# Patient Record
Sex: FEMALE | Race: BLACK OR AFRICAN AMERICAN | Employment: OTHER | URBAN - METROPOLITAN AREA
[De-identification: names, ages, dates, MRNs, and addresses within clinical notes are randomized per-mention and may not be internally consistent; named-entity substitution may affect disease eponyms.]

---

## 2017-03-31 ENCOUNTER — OFFICE VISIT (OUTPATIENT)
Dept: NEUROLOGY | Age: 72
End: 2017-03-31

## 2017-03-31 VITALS
DIASTOLIC BLOOD PRESSURE: 80 MMHG | SYSTOLIC BLOOD PRESSURE: 160 MMHG | RESPIRATION RATE: 18 BRPM | WEIGHT: 100.4 LBS | TEMPERATURE: 98.6 F | HEIGHT: 61 IN | OXYGEN SATURATION: 97 % | BODY MASS INDEX: 18.96 KG/M2 | HEART RATE: 75 BPM

## 2017-03-31 DIAGNOSIS — G62.9 POLYNEUROPATHY: Primary | ICD-10-CM

## 2017-03-31 DIAGNOSIS — R20.2 PARESTHESIA: ICD-10-CM

## 2017-03-31 DIAGNOSIS — R26.9 GAIT DISORDER: ICD-10-CM

## 2017-03-31 DIAGNOSIS — M62.838 MUSCLE SPASM: ICD-10-CM

## 2017-03-31 RX ORDER — DICLOFENAC SODIUM 75 MG/1
TABLET, DELAYED RELEASE ORAL 2 TIMES DAILY
COMMUNITY

## 2017-03-31 RX ORDER — BACLOFEN 10 MG/1
TABLET ORAL
Refills: 3 | COMMUNITY
Start: 2017-02-03 | End: 2021-01-13

## 2017-03-31 RX ORDER — GABAPENTIN 300 MG/1
CAPSULE ORAL
Refills: 2 | COMMUNITY
Start: 2017-02-03 | End: 2018-02-01 | Stop reason: SDUPTHER

## 2017-03-31 RX ORDER — OXYCODONE AND ACETAMINOPHEN 7.5; 325 MG/1; MG/1
1 TABLET ORAL
Qty: 600 TAB | Refills: 0 | Status: SHIPPED | OUTPATIENT
Start: 2017-03-31 | End: 2017-07-31 | Stop reason: SDUPTHER

## 2017-03-31 RX ORDER — FLUTICASONE PROPIONATE 220 UG/1
AEROSOL, METERED RESPIRATORY (INHALATION)
Refills: 2 | COMMUNITY
Start: 2017-02-20

## 2017-03-31 RX ORDER — PREDNISONE 5 MG/1
TABLET ORAL
Refills: 0 | COMMUNITY
Start: 2017-02-03 | End: 2017-04-02 | Stop reason: SDUPTHER

## 2017-03-31 NOTE — MR AVS SNAPSHOT
Visit Information Date & Time Provider Department Dept. Phone Encounter #  
 3/31/2017  1:40 PM [facility] [phone] [id number] Follow-up Instructions Return in about 4 weeks (around 4/28/2017). Upcoming Health Maintenance Date Due Hepatitis C Screening 1945 DTaP/Tdap/Td series (1 - Tdap) 12/3/1967 BREAST CANCER SCRN MAMMOGRAM 12/3/1996 FOBT Q 1 YEAR AGE 50-75 12/3/1996 ZOSTER VACCINE AGE 60> 12/3/2006 GLAUCOMA SCREENING Q2Y 12/3/2011 OSTEOPOROSIS SCREENING (DEXA) 12/3/2011 Pneumococcal 65+ Low/Medium Risk (1 of 2 - PCV13) 12/3/2011 MEDICARE YEARLY EXAM 12/3/2011 INFLUENZA AGE 9 TO ADULT 8/1/2016 Allergies as of 3/31/2017  Review Complete On: 3/31/2017 By: Suraj Flynn MD  
  
 Severity Noted Reaction Type Reactions Penicillins High 08/15/2014   Systemic Angioedema Ampicillin Medium 08/15/2014   Systemic Itching Tramadol  10/21/2015    Itching Current Immunizations  Reviewed on 8/16/2014 No immunizations on file. Not reviewed this visit You Were Diagnosed With   
  
 Codes Comments Polyneuropathy    -  Primary ICD-10-CM: G62.9 ICD-9-CM: 356.9 Muscle spasm     ICD-10-CM: M16.325 ICD-9-CM: 728.85 Paresthesia     ICD-10-CM: R20.2 ICD-9-CM: 782.0 Gait disorder     ICD-10-CM: R26.9 ICD-9-CM: 164. 2 Vitals BP Pulse Temp Resp Height(growth percentile) Weight(growth percentile) 160/80 (BP 1 Location: Left arm, BP Patient Position: Sitting) 75 98.6 °F (37 °C) (Oral) 18 5' 1\" (1.549 m) 100 lb 6.4 oz (45.5 kg) SpO2 BMI OB Status Smoking Status 97% 18.97 kg/m2 Postmenopausal Current Every Day Smoker BMI and BSA Data Body Mass Index Body Surface Area  
 18.97 kg/m 2 1.4 m 2 Preferred Pharmacy Pharmacy Name Phone [id number] [place], [place] - [address] [facility] Rosa M Rae [phone] Your Updated Medication List  
  
   
This list is accurate as of: 3/31/17  2:20 PM.  Always use your most recent med list.  
  
  
  
  
 aspirin, buffered 81 mg Tab Take  by mouth. baclofen 10 mg tablet Commonly known as:  LIORESAL  
TAKE 1 TABLET BY MOUTH AT BEDTIME  
  
 diclofenac EC 75 mg EC tablet Commonly known as:  VOLTAREN Take  by mouth two (2) times a day. FLOVENT  mcg/actuation inhaler Generic drug:  fluticasone INHALE 2 PUFFS BY MOUTH TWICE A DAY  
  
 gabapentin 300 mg capsule Commonly known as:  NEURONTIN  
TAKE ONE CAPSULE BY MOUTH AT BEDTIME  
  
 oxyCODONE-acetaminophen 7.5-325 mg per tablet Commonly known as:  PERCOCET Take 1 Tab by mouth every four (4) hours as needed for Pain. Max Daily Amount: 6 Tabs. PLAVIX 75 mg Tab Generic drug:  clopidogrel Take 75 mg by mouth daily. predniSONE 5 mg tablet Commonly known as:  DELTASONE  
TAKE 1 TABLET BY MOUTH TWICE A DAY PROzac 10 mg capsule Generic drug:  FLUoxetine Take 10 mg by mouth daily. Indications: DEPRESSION ASSOCIATED WITH MANIC DEPRESSIVE DISORDER  
  
 SEROQUEL PO Take  by mouth nightly. traMADol 50 mg tablet Commonly known as:  ULTRAM  
Take 1 Tab by mouth every six (6) hours as needed for Pain. Max Daily Amount: 200 mg. Prescriptions Printed Refills  
 oxyCODONE-acetaminophen (PERCOCET) 7.5-325 mg per tablet 0 Sig: Take 1 Tab by mouth every four (4) hours as needed for Pain. Max Daily Amount: 6 Tabs. Class: Print Route: Oral  
  
Follow-up Instructions Return in about 4 weeks (around 4/28/2017). Introducing [facility] & HEALTH SERVICES! Gema Lazo introduces [employer] patient portal. Now you can access parts of your medical record, email your doctor's office, and request medication refills online. 1. In your internet browser, go to [email or web address] 2. Click on the First Time User? Click Here link in the Sign In box. You will see the New Member Sign Up page. 3. Enter your [employer] Access Code exactly as it appears below. You will not need to use this code after youve completed the sign-up process. If you do not sign up before the expiration date, you must request a new code. · [employer] Access Code: [email or web address] Expires: 6/29/2017  1:18 PM 
 
4. Enter the last four digits of your Social Security Number (xxxx) and Date of Birth (mm/dd/yyyy) as indicated and click Submit. You will be taken to the next sign-up page. 5. Create a [employer] ID. This will be your [employer] login ID and cannot be changed, so think of one that is secure and easy to remember. 6. Create a [employer] password. You can change your password at any time. 7. Enter your Password Reset Question and Answer. This can be used at a later time if you forget your password. 8. Enter your e-mail address. You will receive e-mail notification when new information is available in [address] 9. Click Sign Up. You can now view and download portions of your medical record. 10. Click the Download Summary menu link to download a portable copy of your medical information. If you have questions, please visit the Frequently Asked Questions section of the [employer] website. Remember, [employer] is NOT to be used for urgent needs. For medical emergencies, dial 911. Now available from your iPhone and Android! Please provide this summary of care documentation to your next provider. Your primary care clinician is listed as Fabrice Keating If you have any questions after today's visit, please call [phone].

## 2017-03-31 NOTE — PROGRESS NOTES
Neurology Progress Note    NAME:  Cindy Becerra   :   1945   MRN:   [id number]     Date/Time:  3/31/2017  Subjective:      Cindy Becerra is a 80 y.o. female here today for follow up. Says her joints hurt, experiences numbness and tingling sensation. Had a fall, no LOC. Review of Systems:  Per HPI - Otherwise 12 point ROS was negative     []Unable to obtain  ROS due to  []mental status change  []sedated   []intubated    Medications reviewed:  Current Outpatient Prescriptions   Medication Sig Dispense Refill    baclofen (LIORESAL) 10 mg tablet TAKE 1 TABLET BY MOUTH AT BEDTIME  3    FLOVENT  mcg/actuation inhaler INHALE 2 PUFFS BY MOUTH TWICE A DAY  2    gabapentin (NEURONTIN) 300 mg capsule TAKE ONE CAPSULE BY MOUTH AT BEDTIME  2    predniSONE (DELTASONE) 5 mg tablet TAKE 1 TABLET BY MOUTH TWICE A DAY  0    diclofenac EC (VOLTAREN) 75 mg EC tablet Take  by mouth two (2) times a day.  oxyCODONE-acetaminophen (PERCOCET) 7.5-325 mg per tablet Take 1 Tab by mouth every four (4) hours as needed for Pain. Max Daily Amount: 6 Tabs. 600 Tab 0    traMADol (ULTRAM) 50 mg tablet Take 1 Tab by mouth every six (6) hours as needed for Pain. Max Daily Amount: 200 mg. 15 Tab 0    QUETIAPINE FUMARATE (SEROQUEL PO) Take  by mouth nightly.  FLUoxetine (PROZAC) 10 mg capsule Take 10 mg by mouth daily. Indications: DEPRESSION ASSOCIATED WITH MANIC DEPRESSIVE DISORDER      Aspirin, Buffered 81 mg tab Take  by mouth.  clopidogrel (PLAVIX) 75 mg tablet Take 75 mg by mouth daily. Objective:   Vitals:  Vitals:    17 1323   BP: 160/80   Pulse: 75   Resp: 18   Temp: 98.6 °F (37 °C)   TempSrc: Oral   SpO2: 97%   Weight: 100 lb 6.4 oz (45.5 kg)   Height: 5' 1\" (1.549 m)   PainSc:   8   PainLoc: Back       PHYSICAL EXAM:  General:    Alert, cooperative, no distress, appears stated age. Head:   Normocephalic, without obvious abnormality, atraumatic. Eyes:   Conjunctivae/corneas clear. PERRLA  Nose:  Nares normal. No drainage or sinus tenderness. Throat:    Lips, mucosa, and tongue normal.  No Thrush  Neck:  Supple, symmetrical,  no adenopathy, thyroid: non tender    no carotid bruit and no JVD. Back:    Symmetric,  No CVA tenderness. Lungs:   Clear to auscultation bilaterally. No Wheezing or Rhonchi. No rales. Chest wall:  No tenderness or deformity. No Accessory muscle use. Heart:   Regular rate and rhythm,  no murmur, rub or gallop. Abdomen:   Soft, non-tender. Not distended. Bowel sounds normal. No masses  Extremities: Extremities normal, atraumatic, No cyanosis. No edema. No clubbing  Skin:     Texture, turgor normal. No rashes or lesions. Not Jaundiced  Lymph nodes: Cervical, supraclavicular normal.  Psych:  Good insight. Not depressed. Not anxious or agitated. NEUROLOGICAL EXAM:  Appearance: The patient is well developed, well nourished, provides a coherent history and is in moderate distress. Mental Status: Oriented to time, place and person. Mood and affect appropriate. Cranial Nerves:   Intact visual fields. Fundi are benign. MARIA DOLORES, EOM's full, no nystagmus, no ptosis. Facial sensation is normal. Corneal reflexes are intact. Facial movement is symmetric. Hearing is normal bilaterally. Palate is midline with normal sternocleidomastoid and trapezius muscles are normal. Tongue is midline. Motor:  5/5 strength in upper and lower proximal and distal muscles. Normal bulk and tone. No fasciculations. Reflexes:   Deep tendon reflexes 2+/4 and symmetrical.   Sensory:   Decreased sensation to touch, pinprick and vibration. Gait:  Unsteady gait. Tremor:   No tremor noted. Cerebellar:  No cerebellar signs present. Neurovascular:  Normal heart sounds and regular rhythm, peripheral pulses intact, and no carotid bruits. Lab Data Reviewed:    No visits with results within 3 Month(s) from this visit.   Latest known visit with results is:    Admission on 08/15/2014, Discharged on 08/17/2014   Component Date Value Ref Range Status    Ventricular Rate 08/16/2014 42  BPM Final    Atrial Rate 08/16/2014 42  BPM Final    P-R Interval 08/16/2014 196  ms Final    QRS Duration 08/16/2014 78  ms Final    Q-T Interval 08/16/2014 552  ms Final    QTC Calculation (Bezet) 08/16/2014 460  ms Final    Calculated P Axis 08/16/2014 74  degrees Final    Calculated R Axis 08/16/2014 61  degrees Final    Calculated T Axis 08/16/2014 69  degrees Final    Diagnosis 08/16/2014    Final                    Value:Normal sinus rhythm with 2nd degree AV block                          Nonspecific ST and T wave abnormality                          Abnormal ECG                                                    Confirmed by Mauricio Flores M.D., [facility] ([id number]) on 8/18/2014 9:38:23 AM    INR 08/15/2014 1.1  0.9 - 1.1   Final    A single therapeutic range for Vit K antagonists may not be optimal for all indications - see June, 2008 issue of Chest, American College of Chest Physicians Evidence-Based Clinical Practice Guidelines, 8th Edition.     Prothrombin time 08/15/2014 11.1  9.4 - 11.7 sec Final    aPTT 08/15/2014 25.7  22.1 - 30.0 sec Final    Comment: In addition to factor deficiency, monitoring heparin therapy, etc., evaluation of a prolonged aPTT result should include consideration of preanalytic variables such as heparin flush contamination, specimen integrity issues, etc.                           NEW REFERENCE RANGE FOR [facility] LABORATORIES    aPTT, therapeutic range 08/15/2014      58.0 - 77.0 SECS Final    Crossmatch Expiration 08/15/2014 08/18/2014   Final    ABO/Rh(D) 08/15/2014 O POSITIVE   Final    Antibody screen 08/15/2014 NEG   Final    Sodium 08/16/2014 138  136 - 145 mmol/L Final    Potassium 08/16/2014 4.8  3.5 - 5.1 mmol/L Final    Chloride 08/16/2014 105  97 - 108 mmol/L Final    CO2 08/16/2014 24  21 - 32 mmol/L Final    Anion gap 08/16/2014 9  5 - 15 mmol/L Final    Glucose 08/16/2014 124* 65 - 100 mg/dL Final    BUN 08/16/2014 15  6 - 20 MG/DL Final    Creatinine 08/16/2014 0.66  0.45 - 1.15 MG/DL Final    BUN/Creatinine ratio 08/16/2014 23* 12 - 20   Final    GFR est AA 08/16/2014 >60  >60 ml/min/1.73m2 Final    GFR est non-AA 08/16/2014 >60  >60 ml/min/1.73m2 Final    Comment: Estimated GFR is calculated using the IDMS-traceable Modification of Diet in Renal Disease (MDRD) Study equation, reported for both  Americans (GFRAA) and non- Americans (GFRNA), and normalized to 1.73m2 body surface area. The physician must decide which value applies to the patient. The MDRD study equation should only be used in individuals age 25 or older. It has not been validated for the following: pregnant women, patients with serious comorbid conditions, or on certain medications, or persons with extremes of body size, muscle mass, or nutritional status.  Calcium 08/16/2014 8.0* 8.5 - 10.1 MG/DL Final    WBC 08/16/2014 9.4  3.6 - 11.0 K/uL Final    RBC 08/16/2014 3.49* 3.80 - 5.20 M/uL Final    HGB 08/16/2014 12.0  11.5 - 16.0 g/dL Final    HCT 08/16/2014 35.7  35.0 - 47.0 % Final    MCV 08/16/2014 102.3* 80.0 - 99.0 FL Final    MCH 08/16/2014 34.4* 26.0 - 34.0 PG Final    MCHC 08/16/2014 33.6  30.0 - 36.5 g/dL Final    RDW 08/16/2014 13.7  11.5 - 14.5 % Final    PLATELET 45/86/9779 882  150 - 400 K/uL Final    NEUTROPHILS 08/16/2014 81* 32 - 75 % Final    LYMPHOCYTES 08/16/2014 10* 12 - 49 % Final    MONOCYTES 08/16/2014 9  5 - 13 % Final    EOSINOPHILS 08/16/2014 0  0 - 7 % Final    BASOPHILS 08/16/2014 0  0 - 1 % Final    ABS. NEUTROPHILS 08/16/2014 7.6  1.8 - 8.0 K/UL Final    ABS. LYMPHOCYTES 08/16/2014 0.9  0.8 - 3.5 K/UL Final    ABS. MONOCYTES 08/16/2014 0.9  0.0 - 1.0 K/UL Final    ABS. EOSINOPHILS 08/16/2014 0.0  0.0 - 0.4 K/UL Final    ABS.  BASOPHILS 08/16/2014 0.0  0.0 - 0.1 K/UL Final    Troponin-I, Qt. 08/16/2014 <0.04  <0.05 ng/mL Final    Comment: The presence of detectable troponin above the reference range indicates myocardial injury which may be due to ischemia, myocarditis, trauma, etc.                           Clinical correlation is necessary to establish the significance of this finding. Sequential testing is recommended to determine if the typical rise and fall of cTnI is demonstrated. Note:  Cardiac troponin I has a relatively long half life and may be present well after the CK MB has returned to baseline. The reference range is based on the 99th percentile of the referent population.  Magnesium 08/16/2014 1.9  1.6 - 2.4 mg/dL Final    Ventricular Rate 08/15/2014 72  BPM Final    Atrial Rate 08/15/2014 72  BPM Final    P-R Interval 08/15/2014 214  ms Final    QRS Duration 08/15/2014 78  ms Final    Q-T Interval 08/15/2014 426  ms Final    QTC Calculation (Bezet) 08/15/2014 466  ms Final    Calculated P Axis 08/15/2014 73  degrees Final    Calculated R Axis 08/15/2014 57  degrees Final    Calculated T Axis 08/15/2014 61  degrees Final    Diagnosis 08/15/2014    Final                    Value:Sinus rhythm with sinus arrhythmia with 1st degree AV block                          Left atrial abnormality                          Abnormal ECG                          No previous ECGs available                          Confirmed by Wade Frost M.D., [facility] ([id number]) on 8/19/2014 1:27:47 PM       CT Results (recent): MRI Results (recent):      IR Results (recent):      VAS/US Results (recent):  No results found for this or any previous visit.           Assesment  Patient Active Problem List   Diagnosis Code    Carotid stenosis I65.29    Heart murmur R01.1      ___________________________________________________  PLAN:    Continue current management        ___________________________________________________    Total time spent with patient:  []15   []25   []35   [] __ minutes    Care Plan discussed with:    []Patient   []Family    []Care Manager   []Consultant/Specialist :    ___________________________________________________    Attending Physician: Sissy Bryan MD

## 2017-04-02 RX ORDER — PREDNISONE 5 MG/1
TABLET ORAL
Qty: 60 TAB | Refills: 0 | Status: SHIPPED | OUTPATIENT
Start: 2017-04-02 | End: 2017-05-23 | Stop reason: SDUPTHER

## 2017-04-28 ENCOUNTER — OFFICE VISIT (OUTPATIENT)
Dept: NEUROLOGY | Age: 72
End: 2017-04-28

## 2017-04-28 VITALS
SYSTOLIC BLOOD PRESSURE: 119 MMHG | OXYGEN SATURATION: 97 % | DIASTOLIC BLOOD PRESSURE: 69 MMHG | WEIGHT: 99.6 LBS | RESPIRATION RATE: 16 BRPM | HEIGHT: 61 IN | HEART RATE: 71 BPM | BODY MASS INDEX: 18.81 KG/M2 | TEMPERATURE: 98.8 F

## 2017-04-28 DIAGNOSIS — R51.9 HEADACHE, UNSPECIFIED HEADACHE TYPE: ICD-10-CM

## 2017-04-28 DIAGNOSIS — R20.2 PARESTHESIA: ICD-10-CM

## 2017-04-28 DIAGNOSIS — G62.9 POLYNEUROPATHY: Primary | ICD-10-CM

## 2017-04-28 DIAGNOSIS — G89.29 CHRONIC BILATERAL LOW BACK PAIN WITHOUT SCIATICA: ICD-10-CM

## 2017-04-28 DIAGNOSIS — M47.812 CERVICAL SPONDYLOSIS WITHOUT MYELOPATHY: ICD-10-CM

## 2017-04-28 DIAGNOSIS — M54.50 CHRONIC BILATERAL LOW BACK PAIN WITHOUT SCIATICA: ICD-10-CM

## 2017-04-28 RX ORDER — QUETIAPINE FUMARATE 100 MG/1
100 TABLET, FILM COATED ORAL
COMMUNITY
Start: 2017-03-16

## 2017-04-28 RX ORDER — FLUOXETINE HYDROCHLORIDE 40 MG/1
40 CAPSULE ORAL DAILY
COMMUNITY
Start: 2017-03-16 | End: 2021-01-13

## 2017-04-28 NOTE — MR AVS SNAPSHOT
Visit Information Date & Time Provider Department Dept. Phone Encounter #  
 4/28/2017  2:40 PM Ang Bacon MD [facility] at [address] [place] [address] Dr [id number] Follow-up Instructions Return in about 3 months (around 7/28/2017). Upcoming Health Maintenance Date Due Hepatitis C Screening 1945 DTaP/Tdap/Td series (1 - Tdap) 12/3/1967 BREAST CANCER SCRN MAMMOGRAM 12/3/1996 FOBT Q 1 YEAR AGE 50-75 12/3/1996 ZOSTER VACCINE AGE 60> 12/3/2006 GLAUCOMA SCREENING Q2Y 12/3/2011 OSTEOPOROSIS SCREENING (DEXA) 12/3/2011 Pneumococcal 65+ Low/Medium Risk (1 of 2 - PCV13) 12/3/2011 MEDICARE YEARLY EXAM 12/3/2011 INFLUENZA AGE 9 TO ADULT 8/1/2016 Allergies as of 4/28/2017  Review Complete On: 4/28/2017 By: Mian Walden MD  
  
 Severity Noted Reaction Type Reactions Penicillins High 08/15/2014   Systemic Angioedema Ampicillin Medium 08/15/2014   Systemic Itching Tramadol  10/21/2015    Itching Current Immunizations  Reviewed on 8/16/2014 No immunizations on file. Not reviewed this visit You Were Diagnosed With   
  
 Codes Comments Polyneuropathy    -  Primary ICD-10-CM: G62.9 ICD-9-CM: 356.9 Paresthesia     ICD-10-CM: R20.2 ICD-9-CM: 382. 0 Chronic bilateral low back pain without sciatica     ICD-10-CM: M54.5, G89.29 ICD-9-CM: 724.2, 338.29 Headache, unspecified headache type     ICD-10-CM: R51 ICD-9-CM: 242. 0 Vitals BP Pulse Temp Resp Height(growth percentile) Weight(growth percentile)  
 119/69 (BP 1 Location: Left arm, BP Patient Position: Sitting) 71 98.8 °F (37.1 °C) (Oral) 16 5' 1\" (1.549 m) 99 lb 9.6 oz (45.2 kg) SpO2 BMI OB Status Smoking Status 97% 18.82 kg/m2 Postmenopausal Current Every Day Smoker BMI and BSA Data Body Mass Index Body Surface Area  
 18.82 kg/m 2 1.39 m 2 Preferred Pharmacy Pharmacy Name Phone [id number]- [place], [place] - [address] [phone] Your Updated Medication List  
  
   
This list is accurate as of: 4/28/17  3:29 PM.  Always use your most recent med list.  
  
  
  
  
 aspirin, buffered 81 mg Tab Take  by mouth. baclofen 10 mg tablet Commonly known as:  LIORESAL  
TAKE 1 TABLET BY MOUTH AT BEDTIME  
  
 diclofenac EC 75 mg EC tablet Commonly known as:  VOLTAREN Take  by mouth two (2) times a day. FLOVENT  mcg/actuation inhaler Generic drug:  fluticasone INHALE 2 PUFFS BY MOUTH TWICE A DAY FLUoxetine 40 mg capsule Commonly known as:  PROzac  
daily. gabapentin 300 mg capsule Commonly known as:  NEURONTIN  
TAKE ONE CAPSULE BY MOUTH AT BEDTIME  
  
 oxyCODONE-acetaminophen 7.5-325 mg per tablet Commonly known as:  PERCOCET Take 1 Tab by mouth every four (4) hours as needed for Pain. Max Daily Amount: 6 Tabs. PLAVIX 75 mg Tab Generic drug:  clopidogrel Take 75 mg by mouth daily. predniSONE 5 mg tablet Commonly known as:  DELTASONE  
TAKE 1 TABLET BY MOUTH TWICE A DAY QUEtiapine 100 mg tablet Commonly known as:  SEROquel  
nightly. traMADol 50 mg tablet Commonly known as:  ULTRAM  
Take 1 Tab by mouth every six (6) hours as needed for Pain. Max Daily Amount: 200 mg. Follow-up Instructions Return in about 3 months (around 7/28/2017). Introducing [facility]! Aruna Nassar introduces [employer] patient portal. Now you can access parts of your medical record, email your doctor's office, and request medication refills online. 1. In your internet browser, go to [email or web address] 2. Click on the First Time User? Click Here link in the Sign In box. You will see the New Member Sign Up page. 3. Enter your [employer] Access Code exactly as it appears below.  You will not need to use this code after youve completed the sign-up process. If you do not sign up before the expiration date, you must request a new code. · [employer] Access Code: [email or web address] Expires: 6/29/2017  1:18 PM 
 
4. Enter the last four digits of your Social Security Number (xxxx) and Date of Birth (mm/dd/yyyy) as indicated and click Submit. You will be taken to the next sign-up page. 5. Create a [employer] ID. This will be your [employer] login ID and cannot be changed, so think of one that is secure and easy to remember. 6. Create a [employer] password. You can change your password at any time. 7. Enter your Password Reset Question and Answer. This can be used at a later time if you forget your password. 8. Enter your e-mail address. You will receive e-mail notification when new information is available in [address] 9. Click Sign Up. You can now view and download portions of your medical record. 10. Click the Download Summary menu link to download a portable copy of your medical information. If you have questions, please visit the Frequently Asked Questions section of the [employer] website. Remember, [employer] is NOT to be used for urgent needs. For medical emergencies, dial 911. Now available from your iPhone and Android! Please provide this summary of care documentation to your next provider. Your primary care clinician is listed as Montserrat Calderon If you have any questions after today's visit, please call [phone].

## 2017-04-28 NOTE — PROGRESS NOTES
Chief Complaint   Patient presents with    Other     Polyneuropathy     1. Have you been to the ER, urgent care clinic since your last visit? Hospitalized since your last visit? [facility] emergency room    2. Have you seen or consulted any other health care providers outside of the [facility] since your last visit? Include any pap smears or colon screening. [facility] 4/9/17.

## 2017-04-28 NOTE — PROGRESS NOTES
Neurology Progress Note    NAME:  Jerzy Souza   :   1945   MRN:   [id number]     Date/Time:  2017  Subjective:      Jerzy Souza is a 80 y.o. female here today for follow up. Occasional pain and headache. Says she has been doing fairly well. Occasional dizziness. Denies fall,loss of consciousness. Admits numbness and tingling sensation. Patient still smokes, notes occasional SOB. Review of Systems:  Per HPI - Otherwise 12 point ROS was negative     []Unable to obtain  ROS due to  []mental status change  []sedated   []intubated    Medications reviewed:  Current Outpatient Prescriptions   Medication Sig Dispense Refill    FLUoxetine (PROZAC) 40 mg capsule daily.  QUEtiapine (SEROQUEL) 100 mg tablet nightly.  predniSONE (DELTASONE) 5 mg tablet TAKE 1 TABLET BY MOUTH TWICE A DAY 60 Tab 0    baclofen (LIORESAL) 10 mg tablet TAKE 1 TABLET BY MOUTH AT BEDTIME  3    FLOVENT  mcg/actuation inhaler INHALE 2 PUFFS BY MOUTH TWICE A DAY  2    diclofenac EC (VOLTAREN) 75 mg EC tablet Take  by mouth two (2) times a day.  oxyCODONE-acetaminophen (PERCOCET) 7.5-325 mg per tablet Take 1 Tab by mouth every four (4) hours as needed for Pain. Max Daily Amount: 6 Tabs. 600 Tab 0    traMADol (ULTRAM) 50 mg tablet Take 1 Tab by mouth every six (6) hours as needed for Pain. Max Daily Amount: 200 mg. 15 Tab 0    Aspirin, Buffered 81 mg tab Take  by mouth.  clopidogrel (PLAVIX) 75 mg tablet Take 75 mg by mouth daily.  gabapentin (NEURONTIN) 300 mg capsule TAKE ONE CAPSULE BY MOUTH AT BEDTIME  2        Objective:   Vitals:  Vitals:    17 1458   BP: 119/69   Pulse: 71   Resp: 16   Temp: 98.8 °F (37.1 °C)   TempSrc: Oral   SpO2: 97%   Weight: 99 lb 9.6 oz (45.2 kg)   Height: 5' 1\" (1.549 m)   PainSc:   0 - No pain       PHYSICAL EXAM:  General:    Alert, cooperative, no distress, appears stated age. Head:   Normocephalic, without obvious abnormality, atraumatic.   Eyes: Conjunctivae/corneas clear. PERRLA  Nose:  Nares normal. No drainage or sinus tenderness. Throat:    Lips, mucosa, and tongue normal.  No Thrush  Neck:  Supple, symmetrical,  no adenopathy, thyroid: non tender    no carotid bruit and no JVD. Back:    Symmetric,  No CVA tenderness. Lungs:   Clear to auscultation bilaterally. No Wheezing or Rhonchi. No rales. Chest wall:  No tenderness or deformity. No Accessory muscle use. Heart:   Regular rate and rhythm,  no murmur, rub or gallop. Abdomen:   Soft, non-tender. Not distended. Bowel sounds normal. No masses  Extremities: Extremities normal, atraumatic, No cyanosis. No edema. No clubbing  Skin:     Texture, turgor normal. No rashes or lesions. Not Jaundiced  Lymph nodes: Cervical, supraclavicular normal.  Psych:  Good insight. Not depressed. Not anxious or agitated. NEUROLOGICAL EXAM:  Appearance: The patient is well developed, well nourished, provides a coherent history and is in no acute distress. Mental Status: Oriented to time, place and person. Mood and affect appropriate. Cranial Nerves:   Intact visual fields. Fundi are benign. MARIA DOLORES, EOM's full, no nystagmus, no ptosis. Facial sensation is normal. Corneal reflexes are intact. Facial movement is symmetric. Hearing is normal bilaterally. Palate is midline with normal sternocleidomastoid and trapezius muscles are normal. Tongue is midline. Motor:  5/5 strength in upper and lower proximal and distal muscles. Normal bulk and tone. No fasciculations. Reflexes:   Deep tendon reflexes 2+/4 and symmetrical.   Sensory:   Decreased sensation to touch, pinprick and vibration. Gait:  Normal gait. Tremor:   No tremor noted. Cerebellar:  No cerebellar signs present. Neurovascular:  Normal heart sounds and regular rhythm, peripheral pulses intact, and no carotid bruits. Lab Data Reviewed:    No visits with results within 3 Month(s) from this visit.   Latest known visit with results is:    Admission on 08/15/2014, Discharged on 08/17/2014   Component Date Value Ref Range Status    Ventricular Rate 08/16/2014 42  BPM Final    Atrial Rate 08/16/2014 42  BPM Final    P-R Interval 08/16/2014 196  ms Final    QRS Duration 08/16/2014 78  ms Final    Q-T Interval 08/16/2014 552  ms Final    QTC Calculation (Bezet) 08/16/2014 460  ms Final    Calculated P Axis 08/16/2014 74  degrees Final    Calculated R Axis 08/16/2014 61  degrees Final    Calculated T Axis 08/16/2014 69  degrees Final    Diagnosis 08/16/2014    Final                    Value:Normal sinus rhythm with 2nd degree AV block                          Nonspecific ST and T wave abnormality                          Abnormal ECG                                                    Confirmed by Violet Huertas M.D., [employer] ([id number]) on 8/18/2014 9:38:23 AM    INR 08/15/2014 1.1  0.9 - 1.1   Final    A single therapeutic range for Vit K antagonists may not be optimal for all indications - see June, 2008 issue of Chest, American College of Chest Physicians Evidence-Based Clinical Practice Guidelines, 8th Edition.     Prothrombin time 08/15/2014 11.1  9.4 - 11.7 sec Final    aPTT 08/15/2014 25.7  22.1 - 30.0 sec Final    Comment: In addition to factor deficiency, monitoring heparin therapy, etc., evaluation of a prolonged aPTT result should include consideration of preanalytic variables such as heparin flush contamination, specimen integrity issues, etc.                           NEW REFERENCE RANGE FOR [facility]ATORIES    aPTT, therapeutic range 08/15/2014      58.0 - 77.0 SECS Final    Crossmatch Expiration 08/15/2014 08/18/2014   Final    ABO/Rh(D) 08/15/2014 O POSITIVE   Final    Antibody screen 08/15/2014 NEG   Final    Sodium 08/16/2014 138  136 - 145 mmol/L Final    Potassium 08/16/2014 4.8  3.5 - 5.1 mmol/L Final    Chloride 08/16/2014 105  97 - 108 mmol/L Final    CO2 08/16/2014 24  21 - 32 mmol/L Final    Anion gap 08/16/2014 9  5 - 15 mmol/L Final    Glucose 08/16/2014 124* 65 - 100 mg/dL Final    BUN 08/16/2014 15  6 - 20 MG/DL Final    Creatinine 08/16/2014 0.66  0.45 - 1.15 MG/DL Final    BUN/Creatinine ratio 08/16/2014 23* 12 - 20   Final    GFR est AA 08/16/2014 >60  >60 ml/min/1.73m2 Final    GFR est non-AA 08/16/2014 >60  >60 ml/min/1.73m2 Final    Comment: Estimated GFR is calculated using the IDMS-traceable Modification of Diet in Renal Disease (MDRD) Study equation, reported for both  Americans (GFRAA) and non- Americans (GFRNA), and normalized to 1.73m2 body surface area. The physician must decide which value applies to the patient. The MDRD study equation should only be used in individuals age 25 or older. It has not been validated for the following: pregnant women, patients with serious comorbid conditions, or on certain medications, or persons with extremes of body size, muscle mass, or nutritional status.  Calcium 08/16/2014 8.0* 8.5 - 10.1 MG/DL Final    WBC 08/16/2014 9.4  3.6 - 11.0 K/uL Final    RBC 08/16/2014 3.49* 3.80 - 5.20 M/uL Final    HGB 08/16/2014 12.0  11.5 - 16.0 g/dL Final    HCT 08/16/2014 35.7  35.0 - 47.0 % Final    MCV 08/16/2014 102.3* 80.0 - 99.0 FL Final    MCH 08/16/2014 34.4* 26.0 - 34.0 PG Final    MCHC 08/16/2014 33.6  30.0 - 36.5 g/dL Final    RDW 08/16/2014 13.7  11.5 - 14.5 % Final    PLATELET 41/37/9498 141  150 - 400 K/uL Final    NEUTROPHILS 08/16/2014 81* 32 - 75 % Final    LYMPHOCYTES 08/16/2014 10* 12 - 49 % Final    MONOCYTES 08/16/2014 9  5 - 13 % Final    EOSINOPHILS 08/16/2014 0  0 - 7 % Final    BASOPHILS 08/16/2014 0  0 - 1 % Final    ABS. NEUTROPHILS 08/16/2014 7.6  1.8 - 8.0 K/UL Final    ABS. LYMPHOCYTES 08/16/2014 0.9  0.8 - 3.5 K/UL Final    ABS. MONOCYTES 08/16/2014 0.9  0.0 - 1.0 K/UL Final    ABS. EOSINOPHILS 08/16/2014 0.0  0.0 - 0.4 K/UL Final    ABS.  BASOPHILS 08/16/2014 0.0  0.0 - 0.1 K/UL Final    Troponin-I, Qt. 08/16/2014 <0.04  <0.05 ng/mL Final    Comment: The presence of detectable troponin above the reference range indicates myocardial injury which may be due to ischemia, myocarditis, trauma, etc.                           Clinical correlation is necessary to establish the significance of this finding. Sequential testing is recommended to determine if the typical rise and fall of cTnI is demonstrated. Note:  Cardiac troponin I has a relatively long half life and may be present well after the CK MB has returned to baseline. The reference range is based on the 99th percentile of the referent population.  Magnesium 08/16/2014 1.9  1.6 - 2.4 mg/dL Final    Ventricular Rate 08/15/2014 72  BPM Final    Atrial Rate 08/15/2014 72  BPM Final    P-R Interval 08/15/2014 214  ms Final    QRS Duration 08/15/2014 78  ms Final    Q-T Interval 08/15/2014 426  ms Final    QTC Calculation (Bezet) 08/15/2014 466  ms Final    Calculated P Axis 08/15/2014 73  degrees Final    Calculated R Axis 08/15/2014 57  degrees Final    Calculated T Axis 08/15/2014 61  degrees Final    Diagnosis 08/15/2014    Final                    Value:Sinus rhythm with sinus arrhythmia with 1st degree AV block                          Left atrial abnormality                          Abnormal ECG                          No previous ECGs available                          Confirmed by Jose Hawkins M.D., [facility] [place] ([id number]) on 8/19/2014 1:27:47 PM       CT Results (recent):    Results from Abstract encounter on 04/19/17   CT ABD PELV W CONT    MRI Results (recent):      IR Results (recent):      VAS/US Results (recent):  No results found for this or any previous visit.           Assesment  Patient Active Problem List   Diagnosis Code    Carotid stenosis I65.29    Heart murmur R01.1      ___________________________________________________  PLAN:Continue current management      ICD-10-CM ICD-9-CM    1. Polyneuropathy G62.9 356.9    2. Paresthesia R20.2 782.0    3. Chronic bilateral low back pain without sciatica M54.5 724.2     G89.29 338.29    4.  Headache, unspecified headache type R51 784.0      Follow-up Disposition: Not on File         ___________________________________________________    Total time spent with patient:  []15   []25   []35   [] __ minutes    Care Plan discussed with:    []Patient   []Family    []Care Manager   []Consultant/Specialist :    ___________________________________________________    Attending Physician: Juan Jordan MD

## 2017-05-23 RX ORDER — PREDNISONE 5 MG/1
TABLET ORAL
Qty: 60 TAB | Refills: 0 | Status: SHIPPED | OUTPATIENT
Start: 2017-05-23 | End: 2017-07-31 | Stop reason: SDUPTHER

## 2017-07-31 ENCOUNTER — OFFICE VISIT (OUTPATIENT)
Dept: NEUROLOGY | Age: 72
End: 2017-07-31

## 2017-07-31 VITALS
RESPIRATION RATE: 16 BRPM | WEIGHT: 98.2 LBS | TEMPERATURE: 96.5 F | HEART RATE: 75 BPM | HEIGHT: 61 IN | OXYGEN SATURATION: 97 % | BODY MASS INDEX: 18.54 KG/M2 | SYSTOLIC BLOOD PRESSURE: 158 MMHG | DIASTOLIC BLOOD PRESSURE: 84 MMHG

## 2017-07-31 DIAGNOSIS — F11.90 CHRONIC, CONTINUOUS USE OF OPIOIDS: ICD-10-CM

## 2017-07-31 DIAGNOSIS — R26.9 GAIT DISORDER: ICD-10-CM

## 2017-07-31 DIAGNOSIS — M79.18 MYOFASCIAL MUSCLE PAIN: ICD-10-CM

## 2017-07-31 DIAGNOSIS — G62.9 POLYNEUROPATHY: Primary | ICD-10-CM

## 2017-07-31 DIAGNOSIS — G89.4 CHRONIC PAIN SYNDROME: ICD-10-CM

## 2017-07-31 RX ORDER — PREDNISONE 5 MG/1
5 TABLET ORAL 2 TIMES DAILY
Qty: 60 TAB | Refills: 0 | Status: SHIPPED | OUTPATIENT
Start: 2017-07-31 | End: 2018-02-01 | Stop reason: SDUPTHER

## 2017-07-31 RX ORDER — OXYCODONE AND ACETAMINOPHEN 7.5; 325 MG/1; MG/1
1 TABLET ORAL
Qty: 600 TAB | Refills: 0 | Status: ON HOLD | OUTPATIENT
Start: 2017-07-31 | End: 2017-10-20

## 2017-07-31 RX ORDER — ENOXAPARIN SODIUM 100 MG/ML
INJECTION SUBCUTANEOUS DAILY
COMMUNITY
Start: 2017-07-27 | End: 2017-10-12

## 2017-07-31 NOTE — PROGRESS NOTES
Chief Complaint   Patient presents with    Other     Polyneuropathy    Medication Refill     1. Have you been to the ER, urgent care clinic since your last visit? Hospitalized since your last visit? No    2. Have you seen or consulted any other health care providers outside of the [address] [place] [address] since your last visit? Include any pap smears or colon screening. [facility]      Patient signed control substance contract and UDS.

## 2017-07-31 NOTE — PROGRESS NOTES
Neurology Progress Note    NAME:  Daniela Ba   :   1945   MRN:   [id number]     Date/Time:  2017  Subjective:      Daniela Ba is a 80 y.o. female here today for follow up  Pain is has increased since after the foot surgery, pain is sharp in nature, and tend to radiate,activity aggravates the pain. Says she has been having Right foot pain which started after she twisted her ankle, thought it would get better but persisted,so she had to see the foot dctor. She had Right foot surgery , before the surgery apparently she had Bradycardia, so she would be seeing her PCP, for heart monitor. Admits neck pain, back pain, joint and muscle pain. Denies dysphagia, odynophagia, constipation ,diarrhea, hematuria, dysuria, hematochezia. Review of Systems:   Neurological ROS: positive for - dizziness, gait disturbance, impaired coordination/balance, numbness/tingling and weakness    Medications reviewed:  Current Outpatient Prescriptions   Medication Sig Dispense Refill    enoxaparin (LOVENOX) 40 mg/0.4 mL daily.  predniSONE (DELTASONE) 5 mg tablet Take 1 Tab by mouth two (2) times a day. 60 Tab 0    oxyCODONE-acetaminophen (PERCOCET) 7.5-325 mg per tablet Take 1 Tab by mouth every four (4) hours as needed for Pain. Max Daily Amount: 6 Tabs. 600 Tab 0    FLUoxetine (PROZAC) 40 mg capsule daily.  QUEtiapine (SEROQUEL) 100 mg tablet nightly.  baclofen (LIORESAL) 10 mg tablet TAKE 1 TABLET BY MOUTH AT BEDTIME  3    FLOVENT  mcg/actuation inhaler INHALE 2 PUFFS BY MOUTH TWICE A DAY  2    gabapentin (NEURONTIN) 300 mg capsule TAKE ONE CAPSULE BY MOUTH AT BEDTIME  2    diclofenac EC (VOLTAREN) 75 mg EC tablet Take  by mouth two (2) times a day.  Aspirin, Buffered 81 mg tab Take  by mouth.  traMADol (ULTRAM) 50 mg tablet Take 1 Tab by mouth every six (6) hours as needed for Pain.  Max Daily Amount: 200 mg. 15 Tab 0    clopidogrel (PLAVIX) 75 mg tablet Take 75 mg by mouth daily. Objective:   Vitals:  Vitals:    07/31/17 1340   BP: 158/84   Pulse: 75   Resp: 16   Temp: 96.5 °F (35.8 °C)   TempSrc: Oral   SpO2: 97%   Weight: 98 lb 3.2 oz (44.5 kg)   Height: 5' 1\" (1.549 m)   PainSc:   9   PainLoc: Foot               Lab Data Reviewed:  Lab Results   Component Value Date/Time    WBC 9.4 08/16/2014 03:25 AM    HCT 35.7 08/16/2014 03:25 AM    HGB 12.0 08/16/2014 03:25 AM    PLATELET 552 52/00/6046 03:25 AM       Lab Results   Component Value Date/Time    Sodium 138 08/16/2014 03:25 AM    Potassium 4.8 08/16/2014 03:25 AM    Chloride 105 08/16/2014 03:25 AM    CO2 24 08/16/2014 03:25 AM    Glucose 124 08/16/2014 03:25 AM    BUN 15 08/16/2014 03:25 AM    Creatinine 0.66 08/16/2014 03:25 AM    Calcium 8.0 08/16/2014 03:25 AM       No components found for: TROPQUANT    No results found for: BRENDEN      No results found for: HBA1C, HGBE8, JCL7UYRR, IIR9LIJB, DIV6AFLC     No results found for: B12LT, FOL, RBCF    No results found for: BRENDEN, ANARX, ANAIGG, XBANA    No results found for: CHOL, CHOLPOCT, CHOLX, CHLST, CHOLV, HDL, HDLPOC, LDL, LDLCPOC, LDLC, DLDLP, VLDLC, VLDL, TGLX, TRIGL, TRIGP, TGLPOCT, CHHD, CHHDX      CT Results (recent):    Results from Abstract encounter on 04/19/17   CT ABD PELV W CONT    MRI Results (recent):      IR Results (recent):      VAS/US Results (recent):  No results found for this or any previous visit. PHYSICAL EXAM:  General:    Alert, cooperative, no distress, appears stated age. Head:   Normocephalic, without obvious abnormality, atraumatic. Eyes:   Conjunctivae/corneas clear. PERRLA  Nose:  Nares normal. No drainage or sinus tenderness. Throat:    Lips, mucosa, and tongue normal.  No Thrush  Neck:  Supple, symmetrical,  no adenopathy, thyroid: non tender    no carotid bruit and no JVD. Back:    Symmetric,  No CVA tenderness. Lungs:   Clear to auscultation bilaterally. No Wheezing or Rhonchi. No rales.   Chest wall:  No tenderness or deformity. No Accessory muscle use. Heart:   Regular rate and rhythm,  no murmur, rub or gallop. Abdomen:   Soft, non-tender. Not distended. Bowel sounds normal. No masses  Extremities: Extremities normal, atraumatic, No cyanosis. No edema. No clubbing  Skin:     Texture, turgor normal. No rashes or lesions. Not Jaundiced  Lymph nodes: Cervical, supraclavicular normal.  Psych:  Good insight. Not depressed. Not anxious or agitated. NEUROLOGICAL EXAM:  Appearance: The patient is well developed, well nourished, provides a coherent history and is in no acute distress. Mental Status: Oriented to time, place and person. Mood and affect appropriate. Cranial Nerves:   Intact visual fields. Fundi are benign. MARIA DOLORES, EOM's full, no nystagmus, no ptosis. Facial sensation is normal. Corneal reflexes are intact. Facial movement is symmetric. Hearing is normal bilaterally. Palate is midline with normal sternocleidomastoid and trapezius muscles are normal. Tongue is midline. Motor:  5/5 strength in upper and lower proximal and distal muscles. Normal bulk and tone. No fasciculations. Reflexes:   Deep tendon reflexes 2+/4 and symmetrical.   Sensory:   Normal to touch, pinprick and vibration. Gait:  Unsteady gait. Tremor:   No tremor noted. Cerebellar:  No cerebellar signs present. Neurovascular:  Normal heart sounds and regular rhythm, peripheral pulses intact, and no carotid bruits. Assesment  1. Polyneuropathy (HCC)    - predniSONE (DELTASONE) 5 mg tablet; Dispense: 60 Tab; Refill: 0  - oxyCODONE-acetaminophen (PERCOCET) 7.5-325 mg per tablet; Take 1 Tab by mouth every four (4) hours as needed for Pain. Max Daily Amount: 6 Tabs. Dispense: 600 Tab; Refill: 0    2. Chronic, continuous use of opioids    - COMPLIANCE DRUG SCREEN/PRESCRIPTION MONITORING    ___________________________________________________  PLAN:Medication reviewed with patient.       ICD-10-CM ICD-9-CM    1. [facility]uropathy ([facility] G62.9 356.9 predniSONE (DELTASONE) 5 mg tablet      oxyCODONE-acetaminophen (PERCOCET) 7.5-325 mg per tablet   2. Chronic, continuous use of opioids F11.90 305.51 COMPLIANCE DRUG SCREEN/PRESCRIPTION MONITORING   3. Chronic pain syndrome G89.4 338.4    4. Gait disorder R26.9 781.2    5. Myofascial muscle pain M79.1 729.1      Follow-up Disposition:  Return in about 3 months (around 10/31/2017).            ___________________________________________________    Total time spent with patient:  []15   []25   []35   [] __ minutes    Care Plan discussed with:    [x]Patient   []Family    []Care Manager   []Consultant/Specialist :    ___________________________________________________    Attending Physician: David Parada MD

## 2017-07-31 NOTE — MR AVS SNAPSHOT
Visit Information Date & Time Provider Department Dept. Phone Encounter #  
 7/31/2017  2:00 PM Ang Hood MD [facility] at [address] [place] [address] Dr [id number] Follow-up Instructions Return in about 3 months (around 10/31/2017). Upcoming Health Maintenance Date Due Hepatitis C Screening 1945 DTaP/Tdap/Td series (1 - Tdap) 12/3/1967 BREAST CANCER SCRN MAMMOGRAM 12/3/1996 FOBT Q 1 YEAR AGE 50-75 12/3/1996 ZOSTER VACCINE AGE 60> 10/3/2006 GLAUCOMA SCREENING Q2Y 12/3/2011 OSTEOPOROSIS SCREENING (DEXA) 12/3/2011 Pneumococcal 65+ Low/Medium Risk (1 of 2 - PCV13) 12/3/2011 MEDICARE YEARLY EXAM 12/3/2011 INFLUENZA AGE 9 TO ADULT 8/1/2017 Allergies as of 7/31/2017  Review Complete On: 7/31/2017 By: Caryn Lindsay MD  
  
 Severity Noted Reaction Type Reactions Penicillins High 08/15/2014   Systemic Angioedema Ampicillin Medium 08/15/2014   Systemic Itching Tramadol  10/21/2015    Itching Current Immunizations  Reviewed on 8/16/2014 No immunizations on file. Not reviewed this visit You Were Diagnosed With   
  
 Codes Comments Polyneuropathy ([facility]    -  Primary ICD-10-CM: G62.9 ICD-9-CM: 356.9 Chronic, continuous use of opioids     ICD-10-CM: F11.90 ICD-9-CM: 305.51 Chronic pain syndrome     ICD-10-CM: G89.4 ICD-9-CM: 338.4 Gait disorder     ICD-10-CM: R26.9 ICD-9-CM: 715. 2 Myofascial muscle pain     ICD-10-CM: M79.1 ICD-9-CM: 729.1 Vitals BP Pulse Temp Resp Height(growth percentile) Weight(growth percentile) 158/84 (BP 1 Location: Right arm, BP Patient Position: Sitting) 75 96.5 °F (35.8 °C) (Oral) 16 5' 1\" (1.549 m) 98 lb 3.2 oz (44.5 kg) SpO2 BMI OB Status Smoking Status 97% 18.55 kg/m2 Postmenopausal Current Every Day Smoker BMI and BSA Data Body Mass Index Body Surface Area 18.55 kg/m 2 1.38 m 2 Preferred Pharmacy Pharmacy Name Phone [facility][id number]- [place], [place] - [address] [phone] Your Updated Medication List  
  
   
This list is accurate as of: 7/31/17  2:08 PM.  Always use your most recent med list.  
  
  
  
  
 aspirin, buffered 81 mg Tab Take  by mouth. baclofen 10 mg tablet Commonly known as:  LIORESAL  
TAKE 1 TABLET BY MOUTH AT BEDTIME  
  
 diclofenac EC 75 mg EC tablet Commonly known as:  VOLTAREN Take  by mouth two (2) times a day. enoxaparin 40 mg/0.4 mL Commonly known as:  LOVENOX  
daily. FLOVENT  mcg/actuation inhaler Generic drug:  fluticasone INHALE 2 PUFFS BY MOUTH TWICE A DAY FLUoxetine 40 mg capsule Commonly known as:  PROzac  
daily. gabapentin 300 mg capsule Commonly known as:  NEURONTIN  
TAKE ONE CAPSULE BY MOUTH AT BEDTIME  
  
 oxyCODONE-acetaminophen 7.5-325 mg per tablet Commonly known as:  PERCOCET Take 1 Tab by mouth every four (4) hours as needed for Pain. Max Daily Amount: 6 Tabs. PLAVIX 75 mg Tab Generic drug:  clopidogrel Take 75 mg by mouth daily. predniSONE 5 mg tablet Commonly known as:  Tash Gong Take 1 Tab by mouth two (2) times a day. QUEtiapine 100 mg tablet Commonly known as:  SEROquel  
nightly. traMADol 50 mg tablet Commonly known as:  ULTRAM  
Take 1 Tab by mouth every six (6) hours as needed for Pain. Max Daily Amount: 200 mg. Prescriptions Printed Refills  
 oxyCODONE-acetaminophen (PERCOCET) 7.5-325 mg per tablet 0 Sig: Take 1 Tab by mouth every four (4) hours as needed for Pain. Max Daily Amount: 6 Tabs. Class: Print Route: Oral  
  
Prescriptions Sent to Pharmacy Refills  
 predniSONE (DELTASONE) 5 mg tablet 0 Sig: Take 1 Tab by mouth two (2) times a day. Class: Normal  
 Pharmacy: [facility]/pharmacy [address] [facility] [place] AT [address] #: [phone] Route: Oral  
  
We Performed the Following [facility] MONITORING [[id number] Custom] Follow-up Instructions Return in about 3 months (around 10/31/2017). Introducing [facility]! [facility] introduces [employer] patient portal. Now you can access parts of your medical record, email your doctor's office, and request medication refills online. 1. In your internet browser, go to [email or web address] 2. Click on the First Time User? Click Here link in the Sign In box. You will see the New Member Sign Up page. 3. Enter your [employer] Access Code exactly as it appears below. You will not need to use this code after youve completed the sign-up process. If you do not sign up before the expiration date, you must request a new code. · [employer] Access Code: [email or web address] Expires: 10/29/2017  1:39 PM 
 
4. Enter the last four digits of your Social Security Number (xxxx) and Date of Birth (mm/dd/yyyy) as indicated and click Submit. You will be taken to the next sign-up page. 5. Create a [employer] ID. This will be your [employer] login ID and cannot be changed, so think of one that is secure and easy to remember. 6. Create a [employer] password. You can change your password at any time. 7. Enter your Password Reset Question and Answer. This can be used at a later time if you forget your password. 8. Enter your e-mail address. You will receive e-mail notification when new information is available in [address] 9. Click Sign Up. You can now view and download portions of your medical record. 10. Click the Download Summary menu link to download a portable copy of your medical information. If you have questions, please visit the Frequently Asked Questions section of the [employer] website. Remember, [employer] is NOT to be used for urgent needs. For medical emergencies, dial 911. Now available from your iPhone and Android! Please provide this summary of care documentation to your next provider. Your primary care clinician is listed as Harrison Oviedo If you have any questions after today's visit, please call [phone].

## 2017-08-09 LAB — DRUGS UR: NORMAL

## 2017-10-12 ENCOUNTER — HOSPITAL ENCOUNTER (OUTPATIENT)
Dept: PREADMISSION TESTING | Age: 72
Discharge: HOME | End: 2017-10-12
Attending: SURGERY
Payer: MEDICARE

## 2017-10-12 ENCOUNTER — HOSPITAL ENCOUNTER (OUTPATIENT)
Dept: GENERAL RADIOLOGY | Age: 72
Discharge: HOME | End: 2017-10-12
Attending: SURGERY
Payer: MEDICARE

## 2017-10-12 VITALS
SYSTOLIC BLOOD PRESSURE: 196 MMHG | DIASTOLIC BLOOD PRESSURE: 73 MMHG | WEIGHT: 99.21 LBS | OXYGEN SATURATION: 96 % | TEMPERATURE: 97.5 F | BODY MASS INDEX: 18.73 KG/M2 | HEART RATE: 77 BPM | RESPIRATION RATE: 20 BRPM | HEIGHT: 61 IN

## 2017-10-12 LAB
ABO + RH BLD: NORMAL
ALBUMIN SERPL-MCNC: 4 G/DL (ref 3.5–5)
ALBUMIN/GLOB SERPL: 1.1 {RATIO} (ref 1.1–2.2)
ALP SERPL-CCNC: 107 U/L (ref 45–117)
ALT SERPL-CCNC: 20 U/L (ref 12–78)
ANION GAP SERPL CALC-SCNC: 7 MMOL/L (ref 5–15)
APPEARANCE UR: CLEAR
AST SERPL-CCNC: 22 U/L (ref 15–37)
BACTERIA URNS QL MICRO: NEGATIVE /HPF
BILIRUB SERPL-MCNC: 0.5 MG/DL (ref 0.2–1)
BILIRUB UR QL: NEGATIVE
BLOOD GROUP ANTIBODIES SERPL: NORMAL
BUN SERPL-MCNC: 17 MG/DL (ref 6–20)
BUN/CREAT SERPL: 25 (ref 12–20)
CALCIUM SERPL-MCNC: 8.9 MG/DL (ref 8.5–10.1)
CHLORIDE SERPL-SCNC: 106 MMOL/L (ref 97–108)
CO2 SERPL-SCNC: 25 MMOL/L (ref 21–32)
COLOR UR: NORMAL
CREAT SERPL-MCNC: 0.67 MG/DL (ref 0.55–1.02)
EPITH CASTS URNS QL MICRO: NORMAL /LPF
ERYTHROCYTE [DISTWIDTH] IN BLOOD BY AUTOMATED COUNT: 13.6 % (ref 11.5–14.5)
GLOBULIN SER CALC-MCNC: 3.5 G/DL (ref 2–4)
GLUCOSE SERPL-MCNC: 106 MG/DL (ref 65–100)
GLUCOSE UR STRIP.AUTO-MCNC: NEGATIVE MG/DL
HCT VFR BLD AUTO: 44.3 % (ref 35–47)
HGB BLD-MCNC: 14.9 G/DL (ref 11.5–16)
HGB UR QL STRIP: NEGATIVE
HYALINE CASTS URNS QL MICRO: NORMAL /LPF (ref 0–5)
KETONES UR QL STRIP.AUTO: NEGATIVE MG/DL
LEUKOCYTE ESTERASE UR QL STRIP.AUTO: NEGATIVE
MCH RBC QN AUTO: 35.2 PG (ref 26–34)
MCHC RBC AUTO-ENTMCNC: 33.6 G/DL (ref 30–36.5)
MCV RBC AUTO: 104.7 FL (ref 80–99)
NITRITE UR QL STRIP.AUTO: NEGATIVE
PH UR STRIP: 6 [PH] (ref 5–8)
PLATELET # BLD AUTO: 232 K/UL (ref 150–400)
POTASSIUM SERPL-SCNC: 3.6 MMOL/L (ref 3.5–5.1)
PROT SERPL-MCNC: 7.5 G/DL (ref 6.4–8.2)
PROT UR STRIP-MCNC: NEGATIVE MG/DL
RBC # BLD AUTO: 4.23 M/UL (ref 3.8–5.2)
RBC #/AREA URNS HPF: NORMAL /HPF (ref 0–5)
SODIUM SERPL-SCNC: 138 MMOL/L (ref 136–145)
SP GR UR REFRACTOMETRY: 1.01 (ref 1–1.03)
SPECIMEN EXP DATE BLD: NORMAL
UA: UC IF INDICATED,UAUC: NORMAL
UROBILINOGEN UR QL STRIP.AUTO: 0.2 EU/DL (ref 0.2–1)
WBC # BLD AUTO: 11.3 K/UL (ref 3.6–11)
WBC URNS QL MICRO: NORMAL /HPF (ref 0–4)

## 2017-10-12 PROCEDURE — 71020 XR CHEST PA LAT: CPT

## 2017-10-12 PROCEDURE — 86900 BLOOD TYPING SEROLOGIC ABO: CPT | Performed by: SURGERY

## 2017-10-12 PROCEDURE — 85027 COMPLETE CBC AUTOMATED: CPT | Performed by: SURGERY

## 2017-10-12 PROCEDURE — 80053 COMPREHEN METABOLIC PANEL: CPT | Performed by: SURGERY

## 2017-10-12 PROCEDURE — 81001 URINALYSIS AUTO W/SCOPE: CPT | Performed by: SURGERY

## 2017-10-12 PROCEDURE — 93005 ELECTROCARDIOGRAM TRACING: CPT

## 2017-10-12 PROCEDURE — 36415 COLL VENOUS BLD VENIPUNCTURE: CPT | Performed by: SURGERY

## 2017-10-12 RX ORDER — ALBUTEROL SULFATE 0.83 MG/ML
1.25 SOLUTION RESPIRATORY (INHALATION) ONCE
COMMUNITY

## 2017-10-12 NOTE — PERIOP NOTES
[facility]  Preoperative Instructions        Surgery Date 10/18/17         Time of Arrival 1pm    1. On the day of your surgery, please report to the Surgical Services Registration Desk and sign in at your designated time. The Surgery Center is located to the right of the Emergency Room. 2. You must have someone with you to drive you home. You should not drive a car for 24 hours following surgery. Please make arrangements for a friend or family member to stay with you for the first 24 hours after your surgery. 3. Do not have anything to eat or drink (including water, gum, mints, coffee, juice) after midnight 10/17/17?? Abdoulaye Hopson ? This may not apply to medications prescribed by your physician. ?(Please note below the special instructions with medications to take the morning of your procedure.)    4. We recommend you do not drink any alcoholic beverages for 24 hours before and after your surgery. 5. Contact your surgeons office for instructions on the following medications: non-steroidal anti-inflammatory drugs (i.e. Advil, Aleve), vitamins, and supplements. (Some surgeons will want you to stop these medications prior to surgery and others may allow you to take them)  **If you are currently taking Plavix, Coumadin, Aspirin and/or other blood-thinning agents, contact your surgeon for instructions. ** Your surgeon will partner with the physician prescribing these medications to determine if it is safe to stop or if you need to continue taking. Please do not stop taking these medications without instructions from your surgeon    6. Wear comfortable clothes. Wear glasses instead of contacts. Do not bring any money or jewelry. Please bring picture ID, insurance card, and any prearranged co-payment or hospital payment. Do not wear make-up, particularly mascara the morning of your surgery. Do not wear nail polish, particularly if you are having foot /hand surgery.   Wear your hair loose or down, no ponytails, buns, dimas pins or clips. All body piercings must be removed. Please shower with antibacterial soap for three consecutive days before and on the morning of surgery, but do not apply any lotions, powders or deodorants after the shower on the day of surgery. Please use a fresh towels after each shower. Please sleep in clean clothes and change bed linens the night before surgery. Please do not shave for 48 hours prior to surgery. Shaving of the face is acceptable. 7. You should understand that if you do not follow these instructions your surgery may be cancelled. If your physical condition changes (I.e. fever, cold or flu) please contact your surgeon as soon as possible. 8. It is important that you be on time. If a situation occurs where you may be late, please call [phone] (OR Holding Area). 9. If you have any questions and or problems, please call [phone] (Pre-admission Testing). 10. Your surgery time may be subject to change. You will receive a phone call the evening prior if your time changes. 11.  If having outpatient surgery, you must have someone to drive you here, stay with you during the duration of your stay, and to drive you home at time of discharge. 12.   In an effort to improve the efficiency, privacy, and safety for all of our Pre-op patients visitors are not allowed in the Holding area. Once you arrive and are registered your family/visitors will be asked to remain in the waiting room. The Pre-op staff will get you from the Surgical Waiting Area and will explain to you and your family/visitors that the Pre-op phase is beginning. The staff will answer any questions and provide instructions for tracking of the patient, by use of the existing tracking number and color-coded status board in the waiting room.   At this time the staff will also ask for your designated spokesperson information in the event that the physician or staff need to provide an update or obtain any pertinent information. The designated spokesperson will be notified if the physician needs to speak to family during the pre-operative phase. If at any time your family/visitors has questions or concerns they may approach the volunteer desk in the waiting area for assistance. Special Instructions:Stop diclofenac,aspirin and plavix as instructed by . Use flovent inhaler and bring to hospital morning of surgery. Use albuterol nebulizer at home morning of surgery. MEDICATIONS TO TAKE THE MORNING OF SURGERY WITH A SIP OF WATER:prozac,percocet if needed,prednisone. I understand a pre-operative phone call will be made to verify my surgery time. In the event that I am not available, I give permission for a message to be left on my answering service and/or with another person?   {yes [phone]         ___________________      __________   _________    (Signature of Patient)             (Witness)                (Date and Time)

## 2017-10-12 NOTE — ADVANCED PRACTICE NURSE
Pt in for PAT assessment. JUSTINE 3, METS less than 4. Pt's /53 R arm. Reports MAY but states that is not new for her. Has hx of HTN (not on medication), asthma, TIA. Scheduled for surgery with Dr. Farzana Aldridge on 10/18 and has not been evaluated by PCP since last month (per pt). Pt denies CP, SOB at rest, nausea, vomiting, abdominal pain, edema of extremities. Labs, UA, EKG, CXR ordered in PAT. PC to pt's PCP to advise of pt status and to schedule appt with PCP for evaluation. Msg left on VM of provider line for staff to return call ASAP while pt is in PAT.

## 2017-10-13 LAB
ATRIAL RATE: 60 BPM
CALCULATED P AXIS, ECG09: 67 DEGREES
CALCULATED R AXIS, ECG10: 36 DEGREES
CALCULATED T AXIS, ECG11: 67 DEGREES
DIAGNOSIS, 93000: NORMAL
P-R INTERVAL, ECG05: 182 MS
Q-T INTERVAL, ECG07: 426 MS
QRS DURATION, ECG06: 74 MS
QTC CALCULATION (BEZET), ECG08: 426 MS
VENTRICULAR RATE, ECG03: 60 BPM

## 2017-10-13 NOTE — ADVANCED PRACTICE NURSE
EKG from 10/12/17 reviewed by Dr. Dex Schreiber and compared with pt's previous EKGs from 08/16/14 and 08/15/14. EKG is unchanged. Ok to proceed with surgery.

## 2017-10-13 NOTE — ADVANCED PRACTICE NURSE
PC to Lamberto Guidry, Nurse for Dr. Estrellita Borrero. Advised her of pt's elevated BPs yesterday while in PAT, report of MAY (not new per pt), and expiratory wheezing noted in PAT evaluation. Olimpia Timmons that pt is scheduled for surgery with Dr. Makayla Engel on 10/18/17 and needed evaluation for current sx as well as HTN and not on medication. Labs, EKG and CXR from 10/12/17 faxed to Lamberto Guidry at Dr. Mendy Albarran office with confirmation received. Lamberto Guidry states she will call the pt today for an appt for evaluation.

## 2017-10-16 NOTE — PERIOP NOTES
Called Dr.William Cox's office and left message on vm requesting note from office regarding patients b/p and upcoming surgery.

## 2017-10-17 RX ORDER — AMLODIPINE BESYLATE 5 MG/1
5 TABLET ORAL DAILY
COMMUNITY

## 2017-10-17 RX ORDER — AMLODIPINE BESYLATE 5 MG/1
5 TABLET ORAL DAILY
COMMUNITY
End: 2017-10-20

## 2017-10-18 ENCOUNTER — ANESTHESIA (OUTPATIENT)
Dept: SURGERY | Age: 72
DRG: 252 | End: 2017-10-18
Payer: MEDICARE

## 2017-10-18 ENCOUNTER — HOSPITAL ENCOUNTER (INPATIENT)
Age: 72
LOS: 2 days | Discharge: HOME HEALTH | DRG: 252 | End: 2017-10-20
Attending: SURGERY | Admitting: SURGERY
Payer: MEDICARE

## 2017-10-18 ENCOUNTER — ANESTHESIA EVENT (OUTPATIENT)
Dept: SURGERY | Age: 72
DRG: 252 | End: 2017-10-18
Payer: MEDICARE

## 2017-10-18 DIAGNOSIS — G62.9 POLYNEUROPATHY: ICD-10-CM

## 2017-10-18 PROBLEM — I73.9 PVD (PERIPHERAL VASCULAR DISEASE) (HCC): Status: ACTIVE | Noted: 2017-10-18

## 2017-10-18 PROCEDURE — 65660000000 HC RM CCU STEPDOWN

## 2017-10-18 PROCEDURE — 74011250636 HC RX REV CODE- 250/636: Performed by: SURGERY

## 2017-10-18 PROCEDURE — 77030002924 HC SUT GORTX WLGO -B: Performed by: SURGERY

## 2017-10-18 PROCEDURE — 76060000033 HC ANESTHESIA 1 TO 1.5 HR: Performed by: SURGERY

## 2017-10-18 PROCEDURE — 77030031139 HC SUT VCRL2 J&J -A: Performed by: SURGERY

## 2017-10-18 PROCEDURE — 77030018836 HC SOL IRR NACL ICUM -A: Performed by: SURGERY

## 2017-10-18 PROCEDURE — 77030011640 HC PAD GRND REM COVD -A: Performed by: SURGERY

## 2017-10-18 PROCEDURE — 77030002986 HC SUT PROL J&J -A: Performed by: SURGERY

## 2017-10-18 PROCEDURE — 74011258636 HC RX REV CODE- 258/636: Performed by: SURGERY

## 2017-10-18 PROCEDURE — 74011250636 HC RX REV CODE- 250/636: Performed by: ANESTHESIOLOGY

## 2017-10-18 PROCEDURE — 74011250637 HC RX REV CODE- 250/637: Performed by: SURGERY

## 2017-10-18 PROCEDURE — 77030020782 HC GWN BAIR PAWS FLX 3M -B

## 2017-10-18 PROCEDURE — 76010000161 HC OR TIME 1 TO 1.5 HR INTENSV-TIER 1: Performed by: SURGERY

## 2017-10-18 PROCEDURE — C1768 GRAFT, VASCULAR: HCPCS | Performed by: SURGERY

## 2017-10-18 PROCEDURE — 77030020256 HC SOL INJ NACL 0.9%  500ML: Performed by: SURGERY

## 2017-10-18 PROCEDURE — 74011636637 HC RX REV CODE- 636/637: Performed by: SURGERY

## 2017-10-18 PROCEDURE — 74011250636 HC RX REV CODE- 250/636

## 2017-10-18 PROCEDURE — 76210000017 HC OR PH I REC 1.5 TO 2 HR: Performed by: SURGERY

## 2017-10-18 PROCEDURE — 77030013079 HC BLNKT BAIR HGGR 3M -A: Performed by: NURSE ANESTHETIST, CERTIFIED REGISTERED

## 2017-10-18 PROCEDURE — 77030034849: Performed by: SURGERY

## 2017-10-18 PROCEDURE — 77030005520 HC CATH URETH FOL38 BARD -A: Performed by: SURGERY

## 2017-10-18 PROCEDURE — 74011000250 HC RX REV CODE- 250: Performed by: SURGERY

## 2017-10-18 PROCEDURE — 74011000250 HC RX REV CODE- 250

## 2017-10-18 PROCEDURE — 77030010516 HC APPL HEMA CLP TELE -B: Performed by: SURGERY

## 2017-10-18 DEVICE — GRAFT VASC GOR TX 6 MM ID X 50 CM STD L THN WALLED GOR TX: Type: IMPLANTABLE DEVICE | Site: LEG | Status: FUNCTIONAL

## 2017-10-18 RX ORDER — ACETAMINOPHEN 325 MG/1
325 TABLET ORAL
Status: DISCONTINUED | OUTPATIENT
Start: 2017-10-18 | End: 2017-10-20 | Stop reason: HOSPADM

## 2017-10-18 RX ORDER — ONDANSETRON 2 MG/ML
4 INJECTION INTRAMUSCULAR; INTRAVENOUS AS NEEDED
Status: DISCONTINUED | OUTPATIENT
Start: 2017-10-18 | End: 2017-10-18 | Stop reason: HOSPADM

## 2017-10-18 RX ORDER — LIDOCAINE HYDROCHLORIDE AND EPINEPHRINE 20; 5 MG/ML; UG/ML
INJECTION, SOLUTION EPIDURAL; INFILTRATION; INTRACAUDAL; PERINEURAL AS NEEDED
Status: DISCONTINUED | OUTPATIENT
Start: 2017-10-18 | End: 2017-10-18 | Stop reason: HOSPADM

## 2017-10-18 RX ORDER — MIDAZOLAM HYDROCHLORIDE 1 MG/ML
INJECTION, SOLUTION INTRAMUSCULAR; INTRAVENOUS AS NEEDED
Status: DISCONTINUED | OUTPATIENT
Start: 2017-10-18 | End: 2017-10-18 | Stop reason: HOSPADM

## 2017-10-18 RX ORDER — PROPOFOL 10 MG/ML
INJECTION, EMULSION INTRAVENOUS AS NEEDED
Status: DISCONTINUED | OUTPATIENT
Start: 2017-10-18 | End: 2017-10-18 | Stop reason: HOSPADM

## 2017-10-18 RX ORDER — FLUTICASONE PROPIONATE 220 UG/1
1 AEROSOL, METERED RESPIRATORY (INHALATION)
Status: DISCONTINUED | OUTPATIENT
Start: 2017-10-18 | End: 2017-10-18

## 2017-10-18 RX ORDER — BACLOFEN 10 MG/1
10 TABLET ORAL
Status: DISCONTINUED | OUTPATIENT
Start: 2017-10-18 | End: 2017-10-20 | Stop reason: HOSPADM

## 2017-10-18 RX ORDER — OXYCODONE AND ACETAMINOPHEN 7.5; 325 MG/1; MG/1
1 TABLET ORAL
Status: DISCONTINUED | OUTPATIENT
Start: 2017-10-18 | End: 2017-10-20 | Stop reason: HOSPADM

## 2017-10-18 RX ORDER — PREDNISONE 5 MG/1
5 TABLET ORAL 2 TIMES DAILY
Status: DISCONTINUED | OUTPATIENT
Start: 2017-10-18 | End: 2017-10-20 | Stop reason: HOSPADM

## 2017-10-18 RX ORDER — QUETIAPINE FUMARATE 100 MG/1
100 TABLET, FILM COATED ORAL
Status: DISCONTINUED | OUTPATIENT
Start: 2017-10-18 | End: 2017-10-20 | Stop reason: HOSPADM

## 2017-10-18 RX ORDER — AMLODIPINE BESYLATE 5 MG/1
5 TABLET ORAL DAILY
Status: DISCONTINUED | OUTPATIENT
Start: 2017-10-19 | End: 2017-10-19

## 2017-10-18 RX ORDER — PROPOFOL 10 MG/ML
INJECTION, EMULSION INTRAVENOUS
Status: DISCONTINUED | OUTPATIENT
Start: 2017-10-18 | End: 2017-10-18 | Stop reason: HOSPADM

## 2017-10-18 RX ORDER — CLINDAMYCIN PHOSPHATE 900 MG/50ML
900 INJECTION INTRAVENOUS ONCE
Status: COMPLETED | OUTPATIENT
Start: 2017-10-18 | End: 2017-10-18

## 2017-10-18 RX ORDER — DEXTROSE, SODIUM CHLORIDE, SODIUM LACTATE, POTASSIUM CHLORIDE, AND CALCIUM CHLORIDE 5; .6; .31; .03; .02 G/100ML; G/100ML; G/100ML; G/100ML; G/100ML
75 INJECTION, SOLUTION INTRAVENOUS CONTINUOUS
Status: DISCONTINUED | OUTPATIENT
Start: 2017-10-18 | End: 2017-10-19

## 2017-10-18 RX ORDER — MORPHINE SULFATE IN 0.9 % NACL 150MG/30ML
PATIENT CONTROLLED ANALGESIA SYRINGE INTRAVENOUS
Status: DISCONTINUED | OUTPATIENT
Start: 2017-10-18 | End: 2017-10-20

## 2017-10-18 RX ORDER — DIPHENHYDRAMINE HYDROCHLORIDE 50 MG/ML
12.5 INJECTION, SOLUTION INTRAMUSCULAR; INTRAVENOUS AS NEEDED
Status: DISCONTINUED | OUTPATIENT
Start: 2017-10-18 | End: 2017-10-18 | Stop reason: HOSPADM

## 2017-10-18 RX ORDER — SODIUM CHLORIDE 0.9 % (FLUSH) 0.9 %
5-10 SYRINGE (ML) INJECTION AS NEEDED
Status: DISCONTINUED | OUTPATIENT
Start: 2017-10-18 | End: 2017-10-18 | Stop reason: HOSPADM

## 2017-10-18 RX ORDER — GABAPENTIN 300 MG/1
300 CAPSULE ORAL 2 TIMES DAILY
Status: DISCONTINUED | OUTPATIENT
Start: 2017-10-18 | End: 2017-10-20 | Stop reason: HOSPADM

## 2017-10-18 RX ORDER — ALBUTEROL SULFATE 0.83 MG/ML
1.25 SOLUTION RESPIRATORY (INHALATION) ONCE
Status: ACTIVE | OUTPATIENT
Start: 2017-10-18 | End: 2017-10-19

## 2017-10-18 RX ORDER — PHENYLEPHRINE HCL IN 0.9% NACL 0.4MG/10ML
SYRINGE (ML) INTRAVENOUS AS NEEDED
Status: DISCONTINUED | OUTPATIENT
Start: 2017-10-18 | End: 2017-10-18 | Stop reason: HOSPADM

## 2017-10-18 RX ORDER — GUAIFENESIN 100 MG/5ML
81 LIQUID (ML) ORAL DAILY
Status: DISCONTINUED | OUTPATIENT
Start: 2017-10-19 | End: 2017-10-20 | Stop reason: HOSPADM

## 2017-10-18 RX ORDER — CLOPIDOGREL BISULFATE 75 MG/1
75 TABLET ORAL DAILY
Status: DISCONTINUED | OUTPATIENT
Start: 2017-10-19 | End: 2017-10-20 | Stop reason: HOSPADM

## 2017-10-18 RX ORDER — AMLODIPINE BESYLATE 5 MG/1
5 TABLET ORAL DAILY
Status: DISCONTINUED | OUTPATIENT
Start: 2017-10-19 | End: 2017-10-20 | Stop reason: HOSPADM

## 2017-10-18 RX ORDER — ONDANSETRON 2 MG/ML
4 INJECTION INTRAMUSCULAR; INTRAVENOUS
Status: DISCONTINUED | OUTPATIENT
Start: 2017-10-18 | End: 2017-10-20 | Stop reason: HOSPADM

## 2017-10-18 RX ORDER — DICLOFENAC SODIUM 75 MG/1
75 TABLET, DELAYED RELEASE ORAL 2 TIMES DAILY
Status: DISCONTINUED | OUTPATIENT
Start: 2017-10-18 | End: 2017-10-20 | Stop reason: HOSPADM

## 2017-10-18 RX ORDER — FENTANYL CITRATE 50 UG/ML
50 INJECTION, SOLUTION INTRAMUSCULAR; INTRAVENOUS AS NEEDED
Status: DISCONTINUED | OUTPATIENT
Start: 2017-10-18 | End: 2017-10-18 | Stop reason: HOSPADM

## 2017-10-18 RX ORDER — FENTANYL CITRATE 50 UG/ML
25 INJECTION, SOLUTION INTRAMUSCULAR; INTRAVENOUS
Status: DISCONTINUED | OUTPATIENT
Start: 2017-10-18 | End: 2017-10-18 | Stop reason: HOSPADM

## 2017-10-18 RX ORDER — CLINDAMYCIN PHOSPHATE 300 MG/50ML
300 INJECTION INTRAVENOUS EVERY 8 HOURS
Status: DISCONTINUED | OUTPATIENT
Start: 2017-10-18 | End: 2017-10-20 | Stop reason: HOSPADM

## 2017-10-18 RX ORDER — LIDOCAINE HYDROCHLORIDE 10 MG/ML
0.1 INJECTION, SOLUTION EPIDURAL; INFILTRATION; INTRACAUDAL; PERINEURAL AS NEEDED
Status: DISCONTINUED | OUTPATIENT
Start: 2017-10-18 | End: 2017-10-18 | Stop reason: HOSPADM

## 2017-10-18 RX ORDER — HEPARIN SODIUM 1000 [USP'U]/ML
INJECTION, SOLUTION INTRAVENOUS; SUBCUTANEOUS AS NEEDED
Status: DISCONTINUED | OUTPATIENT
Start: 2017-10-18 | End: 2017-10-18 | Stop reason: HOSPADM

## 2017-10-18 RX ORDER — HYDROMORPHONE HYDROCHLORIDE 1 MG/ML
.2-.5 INJECTION, SOLUTION INTRAMUSCULAR; INTRAVENOUS; SUBCUTANEOUS
Status: DISCONTINUED | OUTPATIENT
Start: 2017-10-18 | End: 2017-10-18 | Stop reason: HOSPADM

## 2017-10-18 RX ORDER — FLUOXETINE HYDROCHLORIDE 20 MG/1
40 CAPSULE ORAL DAILY
Status: DISCONTINUED | OUTPATIENT
Start: 2017-10-19 | End: 2017-10-20 | Stop reason: HOSPADM

## 2017-10-18 RX ORDER — MIDAZOLAM HYDROCHLORIDE 1 MG/ML
0.5 INJECTION, SOLUTION INTRAMUSCULAR; INTRAVENOUS
Status: DISCONTINUED | OUTPATIENT
Start: 2017-10-18 | End: 2017-10-18 | Stop reason: HOSPADM

## 2017-10-18 RX ORDER — SODIUM CHLORIDE, SODIUM LACTATE, POTASSIUM CHLORIDE, CALCIUM CHLORIDE 600; 310; 30; 20 MG/100ML; MG/100ML; MG/100ML; MG/100ML
25 INJECTION, SOLUTION INTRAVENOUS CONTINUOUS
Status: DISCONTINUED | OUTPATIENT
Start: 2017-10-18 | End: 2017-10-18 | Stop reason: HOSPADM

## 2017-10-18 RX ORDER — MORPHINE SULFATE 10 MG/ML
2 INJECTION, SOLUTION INTRAMUSCULAR; INTRAVENOUS
Status: DISCONTINUED | OUTPATIENT
Start: 2017-10-18 | End: 2017-10-18 | Stop reason: HOSPADM

## 2017-10-18 RX ADMIN — LIDOCAINE HYDROCHLORIDE AND EPINEPHRINE 5 ML: 20; 5 INJECTION, SOLUTION EPIDURAL; INFILTRATION; INTRACAUDAL; PERINEURAL at 14:06

## 2017-10-18 RX ADMIN — Medication 80 MCG: at 15:00

## 2017-10-18 RX ADMIN — LIDOCAINE HYDROCHLORIDE AND EPINEPHRINE 5 ML: 20; 5 INJECTION, SOLUTION EPIDURAL; INFILTRATION; INTRACAUDAL; PERINEURAL at 14:03

## 2017-10-18 RX ADMIN — OXYCODONE HYDROCHLORIDE AND ACETAMINOPHEN 1 TABLET: 7.5; 325 TABLET ORAL at 18:53

## 2017-10-18 RX ADMIN — FENTANYL CITRATE 25 MCG: 50 INJECTION, SOLUTION INTRAMUSCULAR; INTRAVENOUS at 16:45

## 2017-10-18 RX ADMIN — CLINDAMYCIN PHOSPHATE 300 MG: 6 INJECTION, SOLUTION INTRAVENOUS at 21:54

## 2017-10-18 RX ADMIN — LIDOCAINE HYDROCHLORIDE AND EPINEPHRINE 5 ML: 20; 5 INJECTION, SOLUTION EPIDURAL; INFILTRATION; INTRACAUDAL; PERINEURAL at 14:09

## 2017-10-18 RX ADMIN — PROPOFOL 40 MG: 10 INJECTION, EMULSION INTRAVENOUS at 14:30

## 2017-10-18 RX ADMIN — Medication: at 16:47

## 2017-10-18 RX ADMIN — Medication 80 MCG: at 14:48

## 2017-10-18 RX ADMIN — PREDNISONE 5 MG: 5 TABLET ORAL at 18:53

## 2017-10-18 RX ADMIN — MIDAZOLAM HYDROCHLORIDE 2 MG: 1 INJECTION, SOLUTION INTRAMUSCULAR; INTRAVENOUS at 13:41

## 2017-10-18 RX ADMIN — ONDANSETRON 4 MG: 2 INJECTION INTRAMUSCULAR; INTRAVENOUS at 19:46

## 2017-10-18 RX ADMIN — Medication 80 MCG: at 15:15

## 2017-10-18 RX ADMIN — SODIUM CHLORIDE, SODIUM LACTATE, POTASSIUM CHLORIDE, AND CALCIUM CHLORIDE 25 ML/HR: 600; 310; 30; 20 INJECTION, SOLUTION INTRAVENOUS at 13:38

## 2017-10-18 RX ADMIN — PROPOFOL 30 MG: 10 INJECTION, EMULSION INTRAVENOUS at 14:36

## 2017-10-18 RX ADMIN — PROPOFOL 75 MCG/KG/MIN: 10 INJECTION, EMULSION INTRAVENOUS at 14:30

## 2017-10-18 RX ADMIN — SODIUM CHLORIDE, SODIUM LACTATE, POTASSIUM CHLORIDE, AND CALCIUM CHLORIDE: 600; 310; 30; 20 INJECTION, SOLUTION INTRAVENOUS at 14:41

## 2017-10-18 RX ADMIN — LIDOCAINE HYDROCHLORIDE AND EPINEPHRINE 5 ML: 20; 5 INJECTION, SOLUTION EPIDURAL; INFILTRATION; INTRACAUDAL; PERINEURAL at 14:00

## 2017-10-18 RX ADMIN — Medication 80 MCG: at 15:08

## 2017-10-18 RX ADMIN — Medication 80 MCG: at 14:54

## 2017-10-18 RX ADMIN — GABAPENTIN 300 MG: 300 CAPSULE ORAL at 18:53

## 2017-10-18 RX ADMIN — HEPARIN SODIUM 5000 UNITS: 1000 INJECTION, SOLUTION INTRAVENOUS; SUBCUTANEOUS at 14:48

## 2017-10-18 RX ADMIN — CLINDAMYCIN PHOSPHATE 900 MG: 18 INJECTION, SOLUTION INTRAVENOUS at 14:32

## 2017-10-18 RX ADMIN — SODIUM CHLORIDE, SODIUM LACTATE, POTASSIUM CHLORIDE, CALCIUM CHLORIDE, AND DEXTROSE MONOHYDRATE 75 ML/HR: 600; 310; 30; 20; 5 INJECTION, SOLUTION INTRAVENOUS at 16:30

## 2017-10-18 NOTE — PERIOP NOTES
Handoff Report from Operating Room to PACU    Report received from Jamil Banda RN and Tania Vick CRNA regarding Stephanie Franko. Surgeon(s):  Merari Vasquez MD  And Procedure(s) (LRB):  RIGHT FEMORAL-POPLITEAL BYPASS W. PTFE GRAFT  (Right)  confirmed   with dressings discussed. Anesthesia type, drugs, patient history, complications, estimated blood loss, vital signs, intake and output, and last pain medication, lines and temperature were reviewed.

## 2017-10-18 NOTE — PERIOP NOTES
TRANSFER - OUT REPORT:    Verbal report given to [place] RN (name) on Eric Ewing  being transferred to PCU (unit) for routine post - op       Report consisted of patients Situation, Background, Assessment and   Recommendations(SBAR). Information from the following report(s) SBAR, Kardex, OR Summary, Procedure Summary, Intake/Output, MAR and Cardiac Rhythm NSR was reviewed with the receiving nurse. Lines:   Peripheral IV 10/18/17 Right Hand (Active)   Site Assessment Clean, dry, & intact 10/18/2017  5:17 PM   Phlebitis Assessment 0 10/18/2017  5:17 PM   Infiltration Assessment 0 10/18/2017  5:17 PM   Dressing Status Clean, dry, & intact 10/18/2017  5:17 PM   Dressing Type Tape;Transparent 10/18/2017  5:17 PM   Hub Color/Line Status Pink; Infusing 10/18/2017  5:17 PM        Opportunity for questions and clarification was provided. Patient transported with:   Monitor  O2 @ 0 liters  Registered Nurse  Tech          Family updated with assigned room number via surgical desk volunteer.

## 2017-10-18 NOTE — BRIEF OP NOTE
BRIEF OPERATIVE NOTE    Date of Procedure: 10/18/2017   Preoperative Diagnosis: ASO RIGHT LEG   Postoperative Diagnosis: ASO RIGHT LEG     Procedure(s):  RIGHT FEMORAL-POPLITEAL BYPASS W.  PTFE GRAFT   Surgeon(s) and Role:     * ROCCO Gold MD - Primary         Assistant Staff:       Surgical Staff:  Circ-1: Elisabeth Stewart RN  Scrub Tech-1: Brenda Garzon  Scrub Tech-Relief: Laura Brown  Surg Asst-1: Vania Prater Staff: Gera Vasquez  Event Time In   Incision Start 1442   Incision Close      Anesthesia: Epidural   Estimated Blood Loss: min  Specimens: * No specimens in log *   Findings: pvd   Complications: 0  Implants: * No implants in log *

## 2017-10-18 NOTE — ANESTHESIA PREPROCEDURE EVALUATION
Anesthetic History   No history of anesthetic complications            Review of Systems / Medical History  Patient summary reviewed, nursing notes reviewed and pertinent labs reviewed    Pulmonary          Smoker  Asthma     Comments: Current Every Day Smoker   Marijuana use   Neuro/Psych     seizures    TIA and psychiatric history    Comments: Polyneuropathy Cardiovascular    Hypertension          PAD  Pertinent negatives: No CAD  Exercise tolerance: <4 METS  Comments: Carotid stenosis   GI/Hepatic/Renal     GERD          Comments: Hx pancreatitis Endo/Other        Arthritis     Other Findings   Comments: ASO RIGHT LEG   cerebral atherosclerosis  chronic opioid use           Physical Exam    Airway  Mallampati: II  TM Distance: 4 - 6 cm  Neck ROM: normal range of motion   Mouth opening: Normal     Cardiovascular    Rhythm: regular  Rate: normal         Dental    Dentition: Upper partial plate     Pulmonary  Breath sounds clear to auscultation               Abdominal  GI exam deferred       Other Findings            Anesthetic Plan    ASA: 3  Anesthesia type: epidural            Anesthetic plan and risks discussed with: Patient

## 2017-10-18 NOTE — IP AVS SNAPSHOT
[address] [facility] 
[phone] Patient: Margo Valdes MRN: [id number] :1945 Current Discharge Medication List  
  
START taking these medications Dose & Instructions Dispensing Information Comments Morning Noon Evening Bedtime  
 nicotine 14 mg/24 hr patch Commonly known as:  Júnior Barney Your last dose was: Your next dose is:    
   
   
 Dose:  1 Patch 1 Patch by TransDERmal route daily for 30 days. Quantity:  30 Patch Refills:  0 CONTINUE these medications which have CHANGED Dose & Instructions Dispensing Information Comments Morning Noon Evening Bedtime  
 amLODIPine 5 mg tablet Commonly known as:  Maricel Spruce What changed:  Another medication with the same name was removed. Continue taking this medication, and follow the directions you see here. Your last dose was: Your next dose is:    
   
   
 Dose:  5 mg Take 5 mg by mouth daily. Refills:  0 CONTINUE these medications which have NOT CHANGED Dose & Instructions Dispensing Information Comments Morning Noon Evening Bedtime  
 albuterol 2.5 mg /3 mL (0.083 %) nebulizer solution Commonly known as:  PROVENTIL VENTOLIN Your last dose was: Your next dose is:    
   
   
 Dose:  1.25 mg  
1.25 mg by Nebulization route once. Refills:  0  
     
   
   
   
  
 aspirin, buffered 81 mg Tab Your last dose was: Your next dose is: Take  by mouth. Refills:  0  
     
   
   
   
  
 baclofen 10 mg tablet Commonly known as:  LIORESAL Your last dose was: Your next dose is: TAKE 1 TABLET BY MOUTH AT BEDTIME Refills:  3  
     
   
   
   
  
 diclofenac EC 75 mg EC tablet Commonly known as:  VOLTAREN Your last dose was: Your next dose is: Take  by mouth two (2) times a day. Refills:  0  
     
   
   
   
  
 FLOVENT  mcg/actuation inhaler Generic drug:  fluticasone Your last dose was: Your next dose is:    
   
   
 INHALE 2 PUFFS BY MOUTH TWICE A DAY Refills:  2 FLUoxetine 40 mg capsule Commonly known as:  PROzac Your last dose was: Your next dose is:    
   
   
 Dose:  40 mg Take 40 mg by mouth daily. Refills:  0  
     
   
   
   
  
 gabapentin 300 mg capsule Commonly known as:  NEURONTIN Your last dose was: Your next dose is: TAKE ONE CAPSULE BY MOUTH AT BEDTIME Refills:  2  
     
   
   
   
  
 oxyCODONE-acetaminophen 7.5-325 mg per tablet Commonly known as:  PERCOCET Your last dose was: Your next dose is:    
   
   
 Dose:  1 Tab Take 1 Tab by mouth every four (4) hours as needed for Pain. Max Daily Amount: 6 Tabs. Quantity:  30 Tab Refills:  0 PLAVIX 75 mg Tab Generic drug:  clopidogrel Your last dose was: Your next dose is:    
   
   
 Dose:  75 mg Take 75 mg by mouth daily. Refills:  0  
     
   
   
   
  
 predniSONE 5 mg tablet Commonly known as:  Juan Lever Your last dose was: Your next dose is:    
   
   
 Dose:  5 mg Take 1 Tab by mouth two (2) times a day. Quantity:  60 Tab Refills:  0 QUEtiapine 100 mg tablet Commonly known as:  SEROquel Your last dose was: Your next dose is:    
   
   
 Dose:  100 mg  
100 mg nightly. Refills:  0 Where to Get Your Medications These medications were sent to [id number] [place] [address] [facility]. [address]  [address] [facility], [address] [place] [address] Hours:  24-hours Phone:  [phone]  
  nicotine 14 mg/24 hr patch Information on where to get these meds will be given to you by the nurse or doctor. ! Ask your nurse or doctor about these medications  
  oxyCODONE-acetaminophen 7.5-325 mg per tablet

## 2017-10-18 NOTE — PERIOP NOTES
1719: Bloody drainage noted gown/sheets. Incision at inner knee oozy. Bright red bloody drainage noted, well approximated. Right groin incision noted dry blood to area. Area around incision raised and hardened. Primary nurse states that it was slightly raised upon arrival to PACU but not like it is at present. Right inner thigh from groin towards inner knee incision feels firm, skin taught, swelling noted in comparison to left leg. Primary nurse made aware and contacted on call service for Dr Juanpablo Ramirez, as is after hours now. Dr Ashley Downing on call. Awaiting call back. 1725: Spoke with Dr Ashley Downing via telephone. Made aware of presenting situation. Received order to apply pressure dressing to areas that are draining, maintain bedrest, OK to transfer to PCU as scheduled.

## 2017-10-18 NOTE — IP AVS SNAPSHOT
[address] [facility] 
[phone] Patient: Percy Gooden MRN: [id number] :1945 You are allergic to the following Allergen Reactions Penicillins Angioedema Ampicillin Itching Tramadol Itching Immunizations Administered for This Admission Name Date Influenza Vaccine (Quad) PF 10/19/2017 Recent Documentation Height Weight BMI OB Status Smoking Status 1.549 m 42.9 kg 17.87 kg/m2 Postmenopausal Current Every Day Smoker Emergency Contacts Name Discharge Info Relation Home Work Mobile ArguelloGab DISCHARGE CAREGIVER [3] Friend [5]   [phone] About your hospitalization You were admitted on:  2017 You last received care in the:  [facility] PROGRESSIVE [facility]ARE You were discharged on:  2017 Unit phone number:  [phone] Why you were hospitalized Your primary diagnosis was:  Not on File Your diagnoses also included:  Pvd (Peripheral Vascular Disease) (Hcc) Providers Seen During Your Hospitalizations Provider Role Specialty Primary office phone Cl Prasad MD Attending Provider Vascular Surgery [phone] Your Primary Care Physician (PCP) Primary Care Physician Office Phone Office Fax Naheedace Winston [phone] Follow-up Information Follow up With Details Comments Contact Info Ina Angeles MD Go on 10/27/2017 PCP - follow up at 11:30 AM  [address] [place] [address] 
[phone] Cl Prasad MD Go on 2017 Vascular Surgery - follow up at 1:30 PM  [facility] [address] [place] [address] 
[phone] [address] [place] [address]  They will provide a hospital to home visit. [address] 
1st Floor [facility] [address] 
[phone] Your Appointments  2017 10:40 AM EDT  
 Follow Up with Cletus Philipp Bumpers, MD  
[facility]) [address]  
[phone] Current Discharge Medication List  
  
START taking these medications Dose & Instructions Dispensing Information Comments Morning Noon Evening Bedtime  
 nicotine 14 mg/24 hr patch Commonly known as:  Júnior Barney Your last dose was: Your next dose is:    
   
   
 Dose:  1 Patch 1 Patch by TransDERmal route daily for 30 days. Quantity:  30 Patch Refills:  0 CONTINUE these medications which have CHANGED Dose & Instructions Dispensing Information Comments Morning Noon Evening Bedtime  
 amLODIPine 5 mg tablet Commonly known as:  Maricel Spruce What changed:  Another medication with the same name was removed. Continue taking this medication, and follow the directions you see here. Your last dose was: Your next dose is:    
   
   
 Dose:  5 mg Take 5 mg by mouth daily. Refills:  0 CONTINUE these medications which have NOT CHANGED Dose & Instructions Dispensing Information Comments Morning Noon Evening Bedtime  
 albuterol 2.5 mg /3 mL (0.083 %) nebulizer solution Commonly known as:  PROVENTIL VENTOLIN Your last dose was: Your next dose is:    
   
   
 Dose:  1.25 mg  
1.25 mg by Nebulization route once. Refills:  0  
     
   
   
   
  
 aspirin, buffered 81 mg Tab Your last dose was: Your next dose is: Take  by mouth. Refills:  0  
     
   
   
   
  
 baclofen 10 mg tablet Commonly known as:  LIORESAL Your last dose was: Your next dose is: TAKE 1 TABLET BY MOUTH AT BEDTIME Refills:  3  
     
   
   
   
  
 diclofenac EC 75 mg EC tablet Commonly known as:  VOLTAREN Your last dose was: Your next dose is: Take  by mouth two (2) times a day. Refills:  0  
     
   
   
   
  
 FLOVENT  mcg/actuation inhaler Generic drug:  fluticasone Your last dose was: Your next dose is:    
   
   
 INHALE 2 PUFFS BY MOUTH TWICE A DAY Refills:  2 FLUoxetine 40 mg capsule Commonly known as:  PROzac Your last dose was: Your next dose is:    
   
   
 Dose:  40 mg Take 40 mg by mouth daily. Refills:  0  
     
   
   
   
  
 gabapentin 300 mg capsule Commonly known as:  NEURONTIN Your last dose was: Your next dose is: TAKE ONE CAPSULE BY MOUTH AT BEDTIME Refills:  2  
     
   
   
   
  
 oxyCODONE-acetaminophen 7.5-325 mg per tablet Commonly known as:  PERCOCET Your last dose was: Your next dose is:    
   
   
 Dose:  1 Tab Take 1 Tab by mouth every four (4) hours as needed for Pain. Max Daily Amount: 6 Tabs. Quantity:  30 Tab Refills:  0 PLAVIX 75 mg Tab Generic drug:  clopidogrel Your last dose was: Your next dose is:    
   
   
 Dose:  75 mg Take 75 mg by mouth daily. Refills:  0  
     
   
   
   
  
 predniSONE 5 mg tablet Commonly known as:  Laith Flores Your last dose was: Your next dose is:    
   
   
 Dose:  5 mg Take 1 Tab by mouth two (2) times a day. Quantity:  60 Tab Refills:  0 QUEtiapine 100 mg tablet Commonly known as:  SEROquel Your last dose was: Your next dose is:    
   
   
 Dose:  100 mg  
100 mg nightly. Refills:  0 Where to Get Your Medications These medications were sent to [id number] [place] [address] S. [address]  [address] [facility], [address] [place] [address] Hours:  24-hours Phone:  [phone]  
  nicotine 14 mg/24 hr patch Information on where to get these meds will be given to you by the nurse or doctor. ! Ask your nurse or doctor about these medications  
  oxyCODONE-acetaminophen 7.5-325 mg per tablet Discharge Instructions None Discharge Orders None Introducing [facility]! Pavan Garcia introduces [employer] patient portal. Now you can access parts of your medical record, email your doctor's office, and request medication refills online. 1. In your internet browser, go to [email or web address] 2. Click on the First Time User? Click Here link in the Sign In box. You will see the New Member Sign Up page. 3. Enter your [employer] Access Code exactly as it appears below. You will not need to use this code after youve completed the sign-up process. If you do not sign up before the expiration date, you must request a new code. · [employer] Access Code: [email or web address] Expires: 10/29/2017  1:39 PM 
 
4. Enter the last four digits of your Social Security Number (xxxx) and Date of Birth (mm/dd/yyyy) as indicated and click Submit. You will be taken to the next sign-up page. 5. Create a [employer] ID. This will be your [employer] login ID and cannot be changed, so think of one that is secure and easy to remember. 6. Create a [employer] password. You can change your password at any time. 7. Enter your Password Reset Question and Answer. This can be used at a later time if you forget your password. 8. Enter your e-mail address. You will receive e-mail notification when new information is available in [address] 9. Click Sign Up. You can now view and download portions of your medical record. 10. Click the Download Summary menu link to download a portable copy of your medical information. If you have questions, please visit the Frequently Asked Questions section of the [employer] website. Remember, [employer] is NOT to be used for urgent needs. For medical emergencies, dial 911. Now available from your iPhone and Android! General Information Please provide this summary of care documentation to your next provider. Patient Signature:  ____________________________________________________________ Date:  ____________________________________________________________  
  
Lemuel Artist Provider Signature:  ____________________________________________________________ Date:  ____________________________________________________________

## 2017-10-18 NOTE — ANESTHESIA POSTPROCEDURE EVALUATION
Post-Anesthesia Evaluation and Assessment    Patient: Stephanie Abdul MRN: [id number]  SSN: [id number]    YOB: 1945  Age: 80 y.o. Sex: female       Cardiovascular Function/Vital Signs  Visit Vitals    /65 (BP 1 Location: Left arm, BP Patient Position: At rest)    Pulse 84    Temp 36.5 °C (97.7 °F)    Resp 20    Ht 5' 1\" (1.549 m)    Wt 42.9 kg (94 lb 9.2 oz)    SpO2 97%    BMI 17.87 kg/m2       Patient is status post epidural anesthesia for Procedure(s):  RIGHT FEMORAL-POPLITEAL BYPASS W. PTFE GRAFT . Nausea/Vomiting: None    Postoperative hydration reviewed and adequate. Pain:  Pain Scale 1: Numeric (0 - 10) (10/18/17 1707)  Pain Intensity 1: 5 (10/18/17 1707)   Managed    Neurological Status:   Neuro (WDL): Exceptions to WDL (10/18/17 1545)  Neuro  Neurologic State: Drowsy; Eyes open to voice (10/18/17 1545)  Orientation Level: Oriented to person;Oriented to place (10/18/17 1545)  Cognition: Follows commands (10/18/17 1545)  Speech: Clear (10/18/17 1545)  LUE Motor Response: Purposeful (10/18/17 1545)  LLE Motor Response: Numbness (10/18/17 1545)  RUE Motor Response: Purposeful (10/18/17 1545)  RLE Motor Response: Numbness (10/18/17 1545)   At baseline    Mental Status and Level of Consciousness: Arousable    Pulmonary Status:   O2 Device: Room air (10/18/17 1715)   Adequate oxygenation and airway patent    Complications related to anesthesia: None    Post-anesthesia assessment completed.  No concerns    Signed By: Mary Ellen Burton MD     October 18, 2017

## 2017-10-18 NOTE — ROUTINE PROCESS
Bedside shift change report given to [facility] WILLY LACY (oncoming nurse) by Ivett Nunez RN (offgoing nurse). Report included the following information SBAR, Kardex, Procedure Summary, Intake/Output, MAR, Accordion, Recent Results, Med Rec Status and Alarm Parameters .

## 2017-10-18 NOTE — ANESTHESIA PROCEDURE NOTES
Epidural Block    Performed by: Diana Melo  Authorized by: Diana Melo     Pre-Procedure  Indication: labor epidural    Preanesthetic Checklist: risks and benefits discussed, site marked and timeout performed      Epidural:   Patient position:  Seated  Prep region:  Lumbar  Prep: Chlorhexidine    Location:  L2-3    Needle and Epidural Catheter:   Needle Type:  Tuohy  Needle Gauge:  17 G  Injection Technique:  Loss of resistance using air  Attempts:  1  Catheter Size:  19 G  Events: no blood with aspiration, no cerebrospinal fluid with aspiration, no paresthesia and negative aspiration test    Test Dose:  Lidocaine 1.5% w/ epi and negative    Assessment:   Catheter Secured:  Tegaderm and tape  Insertion:  Uncomplicated  Patient tolerance:  Patient tolerated the procedure well with no immediate complications

## 2017-10-18 NOTE — PROCEDURES
Epidural Follow-up Note    Day of Surgery sp Procedure(s):  RIGHT FEMORAL-POPLITEAL BYPASS W. PTFE GRAFT . Visit Vitals    BP (P) 120/58 (BP 1 Location: Left arm, BP Patient Position: At rest)  Comment: Simultaneous filing. User may not have seen previous data.  Pulse 79  Comment: Simultaneous filing. User may not have seen previous data.  Temp 36.5 °C (97.7 °F)    Resp 21  Comment: Simultaneous filing. User may not have seen previous data.  Ht 5' 1\" (1.549 m)    Wt 42.9 kg (94 lb 9.2 oz)    SpO2 95%  Comment: Simultaneous filing. User may not have seen previous data.  BMI 17.87 kg/m2   . Epidural site is clean, dry and intact. Epidural catheter removed with blue-tip intact.

## 2017-10-18 NOTE — PROGRESS NOTES
0700: Bedside shift change report given to Danica Gregg (oncoming nurse) by Loree Dowell RN (offgoing nurse). Report included the following information SBAR, Kardex, Intake/Output, MAR, Recent Results and Cardiac Rhythm NSR. PCU SHIFT NURSING NOTE  Shift Summary:    1930: Bedside shift change report given to Loree Dowell RN (oncoming nurse) by Amanda Lozada (offgoing nurse). Report included the following information SBAR, Kardex, Intake/Output, MAR, Recent Results and Cardiac Rhythm NSR. PCA reviewed c offgoing shift (see MAR) Surgical site dsg C/D/I; RLE warm, sensation present, pt denies numbness,tingling; pedal pulse +2 to palpation. 1945: Pt c/o nausea, dry heaving. Pt repositioned on right side and provided with emesis bag. PRN Zofran given. 2000: Pt verbalizing relief of nausea. 2200: Pt resting comfortably; denies pain. Sites remain C/D/I; no increase in swelling; neurovascular intact. 0600: Pt reports pain well-controlled; swelling less to incision sites; dsgs C/D/I. No further episodes of nausea. Pt slept between care. Admission Date 10/18/2017   Admission Diagnosis ASO RIGHT LEG   PVD [facility]per[facility] vascular [facility]) [facility]   Consults IP CONSULT TO HOSPITALIST        Consults   []PT   []OT   []Speech   []Case Management      [] Palliative      Cardiac Monitoring Order   []Yes   []No     IV drips   []Yes    Drip:                            Dose:  Drip:                            Dose:  Drip:                            Dose:   []No     GI Prophylaxis   []Yes   []No         DVT Prophylaxis   SCDs:  Sequential Compression Device: Bilateral          Glen stockings:         [] Medication   []Contraindicated   []None      Activity Level           Purposeful Rounding every 1-2 hour?    []Yes   Oconnor Score  Total Score: 3   Bed Alarm (If score 3 or >)   []Yes   [] Refused (See signed refusal form in chart)   Obey Score  Obey Score: 17   Obey Score (if score 14 or less)   []PMT consult   []Wound Care consult      []Specialty bed   [] Nutrition consult          Needs prior to discharge:   Home O2 required:    []Yes   []No    If yes, how much O2 required? Other:    Last Bowel Movement: Last Bowel Movement Date: 10/17/17      Influenza Vaccine Received Flu Vaccine for Current Season (usually Sept-March): No    Patient/Guardian Refused (Notify MD): No   Pneumonia Vaccine           Diet Active Orders   Diet    DIET CARDIAC      LDAs               Peripheral IV 10/18/17 Right Hand (Active)   Site Assessment Clean, dry, & intact 10/18/2017  5:59 PM   Phlebitis Assessment 0 10/18/2017  5:59 PM   Infiltration Assessment 0 10/18/2017  5:59 PM   Dressing Status Clean, dry, & intact 10/18/2017  5:59 PM   Dressing Type Tape;Transparent 10/18/2017  5:59 PM   Hub Color/Line Status Pink;Flushed; Infusing;Patent 10/18/2017  5:59 PM   Action Taken Open ports on tubing capped 10/18/2017  5:59 PM   Alcohol Cap Used Yes 10/18/2017  5:59 PM                      Urinary Catheter Urinary Catheter 10/18/17 2- way; Rudd-Criteria for Appropriate Use: Surgical procedure    Intake & Output   Date 10/17/17 1900 - 10/18/17 0659(Not Admitted) 10/18/17 0700 - 10/19/17 0659   Shift 4526-2740 24 Hour Total 5313-6824 0352-1927 24 Hour Total   I  N  T  A  K  E   I.V.   1693.8  (3.3)  1693.8      Volume (lactated Ringers infusion)   1600  1600      Volume (dextrose 5% lactated ringers infusion)   93.8  93.8    Shift Total  (mL/kg)   1693.8  (39.5)  1693.8  (39.5)   O  U  T  P  U  T   Urine   475  (0.9)  475      Urine Occurrence(s)   1 x  1 x      Urine Output (mL) (Urinary Catheter 10/18/17 2- way; Rudd)   475  475    Blood   30  30      Estimated Blood Loss   30  30    Shift Total  (mL/kg)   505  (11.8)  505  (11.8)   NET   1188.8  1188.8   Weight (kg)   42.9 42.9 42.9         Readmission Risk Assessment Tool Score Medium Risk            14       Total Score        3 Has Seen PCP in Last 6 Months (Yes=3, No=0)    2 .  Living with Significant Other. Assisted Living. LTAC. SNF. or   Rehab    9 Pt.  Coverage (Medicare=5 , Medicaid, or Self-Pay=4)        Criteria that do not apply:    Patient Length of Stay (>5 days = 3)    IP Visits Last 12 Months (1-3=4, 4=9, >4=11)    Charlson Comorbidity Score (Age + Comorbid Conditions)       Expected Length of Stay - - -   Actual Length of Stay 0

## 2017-10-18 NOTE — OP NOTES
[facility] [address]   OP NOTE       Name:  Erminia Cranker   MR#:  [id number]   :  1945   Account #:  [de-identified]    Surgery Date:  10/18/2017   Date of Adm:  10/18/2017       PREOPERATIVE DIAGNOSIS: Peripheral vascular disease. POSTOPERATIVE DIAGNOSIS: Peripheral vascular disease. PROCEDURE PERFORMED: Right femoral above-knee popliteal   bypass graft. 6 mm thin-walled PTFE. SURGEON: YADIRA Steward MD.    Lacy Cordero. ANESTHESIA: Epidural.    ESTIMATED BLOOD LOSS: Minimal.    IMPLANTS: A 6 mm thin-walled PTFE graft. SPECIMENS REMOVED: None. COMPLICATIONS: None. PROCEDURE: After established epidural anesthesia, the patient was   placed supine on the operating table. The right leg was prepared as a   sterile field. Skin incision made over the thigh distally, medially where   the popliteal arteries were identified and isolated. It was diseased, but   patent. An oblique incision was made in the groin where the common femoral   artery and branch identified and isolated. The patient was systemically   heparinized. The popliteal was occluded and opened longitudinally. A 6   mm thin wall PTFE graft was selected in appropriate fashion. It was   anastomosed end-to-side to the popliteals, running silk suture 5-0   PTFE. The graft brought through subcutaneous tunnel to lie in the   groin. Care was taken not to rotate the graft in its . The femoral   artery was occluded and opened longitudinally. The PTFE graft in   appropriate fashion anastomosed inside the common femorals. with a   running simple suture of 5-0 PTFE. Release of the vascular occlusion   clamps resulted in an excellent pulse and Doppler signal in the system. Heparinization was not reversed. The wounds were examined for   hemostasis, which was considered adequate.  Subcutaneous tissue   was closed with 3-0 Vicryl and skin closed with subcuticular suture of   Vicryl. Flexible collodion was applied and the patient returned to the   recovery area in satisfactory condition.          MD Jim Stokes / Francisca.Daryn   D:  10/18/2017   15:26   T:  10/18/2017   16:02   Job #:  [id number]

## 2017-10-19 PROCEDURE — 90471 IMMUNIZATION ADMIN: CPT

## 2017-10-19 PROCEDURE — 74011000258 HC RX REV CODE- 258: Performed by: NURSE PRACTITIONER

## 2017-10-19 PROCEDURE — 74011250637 HC RX REV CODE- 250/637: Performed by: SURGERY

## 2017-10-19 PROCEDURE — 74011258636 HC RX REV CODE- 258/636: Performed by: SURGERY

## 2017-10-19 PROCEDURE — 74011250636 HC RX REV CODE- 250/636: Performed by: SURGERY

## 2017-10-19 PROCEDURE — 65660000000 HC RM CCU STEPDOWN

## 2017-10-19 PROCEDURE — 74011250637 HC RX REV CODE- 250/637: Performed by: NURSE PRACTITIONER

## 2017-10-19 PROCEDURE — 041K0JL BYPASS RIGHT FEMORAL ARTERY TO POPLITEAL ARTERY WITH SYNTHETIC SUBSTITUTE, OPEN APPROACH: ICD-10-PCS | Performed by: SURGERY

## 2017-10-19 PROCEDURE — 90686 IIV4 VACC NO PRSV 0.5 ML IM: CPT | Performed by: SURGERY

## 2017-10-19 PROCEDURE — 74011636637 HC RX REV CODE- 636/637: Performed by: SURGERY

## 2017-10-19 RX ORDER — IBUPROFEN 200 MG
1 TABLET ORAL DAILY
Status: DISCONTINUED | OUTPATIENT
Start: 2017-10-19 | End: 2017-10-20 | Stop reason: HOSPADM

## 2017-10-19 RX ORDER — ALBUTEROL SULFATE 0.83 MG/ML
1.25 SOLUTION RESPIRATORY (INHALATION)
Status: DISCONTINUED | OUTPATIENT
Start: 2017-10-19 | End: 2017-10-20 | Stop reason: HOSPADM

## 2017-10-19 RX ORDER — SENNOSIDES 8.6 MG/1
2 TABLET ORAL
Status: DISCONTINUED | OUTPATIENT
Start: 2017-10-19 | End: 2017-10-20 | Stop reason: HOSPADM

## 2017-10-19 RX ORDER — SODIUM CHLORIDE 450 MG/100ML
25 INJECTION, SOLUTION INTRAVENOUS CONTINUOUS
Status: DISCONTINUED | OUTPATIENT
Start: 2017-10-19 | End: 2017-10-20

## 2017-10-19 RX ADMIN — QUETIAPINE FUMARATE 50 MG: 100 TABLET ORAL at 21:29

## 2017-10-19 RX ADMIN — Medication: at 06:57

## 2017-10-19 RX ADMIN — AMLODIPINE BESYLATE 5 MG: 5 TABLET ORAL at 08:37

## 2017-10-19 RX ADMIN — FLUOXETINE 40 MG: 20 CAPSULE ORAL at 08:37

## 2017-10-19 RX ADMIN — PREDNISONE 5 MG: 5 TABLET ORAL at 08:37

## 2017-10-19 RX ADMIN — SODIUM CHLORIDE 25 ML/HR: 450 INJECTION, SOLUTION INTRAVENOUS at 09:38

## 2017-10-19 RX ADMIN — CLINDAMYCIN PHOSPHATE 300 MG: 6 INJECTION, SOLUTION INTRAVENOUS at 14:21

## 2017-10-19 RX ADMIN — DICLOFENAC SODIUM 75 MG: 75 TABLET, DELAYED RELEASE ORAL at 08:37

## 2017-10-19 RX ADMIN — DICLOFENAC SODIUM 75 MG: 75 TABLET, DELAYED RELEASE ORAL at 17:19

## 2017-10-19 RX ADMIN — SODIUM CHLORIDE, SODIUM LACTATE, POTASSIUM CHLORIDE, CALCIUM CHLORIDE, AND DEXTROSE MONOHYDRATE 75 ML/HR: 600; 310; 30; 20; 5 INJECTION, SOLUTION INTRAVENOUS at 06:19

## 2017-10-19 RX ADMIN — SENNOSIDES 17.2 MG: 8.6 TABLET, FILM COATED ORAL at 21:36

## 2017-10-19 RX ADMIN — PREDNISONE 5 MG: 5 TABLET ORAL at 17:19

## 2017-10-19 RX ADMIN — CLOPIDOGREL BISULFATE 75 MG: 75 TABLET ORAL at 08:37

## 2017-10-19 RX ADMIN — FLUTICASONE FUROATE 1 PUFF: 100 POWDER RESPIRATORY (INHALATION) at 08:48

## 2017-10-19 RX ADMIN — OXYCODONE HYDROCHLORIDE AND ACETAMINOPHEN 1 TABLET: 7.5; 325 TABLET ORAL at 22:25

## 2017-10-19 RX ADMIN — GABAPENTIN 300 MG: 300 CAPSULE ORAL at 08:37

## 2017-10-19 RX ADMIN — OXYCODONE HYDROCHLORIDE AND ACETAMINOPHEN 1 TABLET: 7.5; 325 TABLET ORAL at 18:16

## 2017-10-19 RX ADMIN — CLINDAMYCIN PHOSPHATE 300 MG: 6 INJECTION, SOLUTION INTRAVENOUS at 05:32

## 2017-10-19 RX ADMIN — ASPIRIN 81 MG 81 MG: 81 TABLET ORAL at 08:37

## 2017-10-19 RX ADMIN — GABAPENTIN 300 MG: 300 CAPSULE ORAL at 17:19

## 2017-10-19 RX ADMIN — CLINDAMYCIN PHOSPHATE 300 MG: 6 INJECTION, SOLUTION INTRAVENOUS at 21:19

## 2017-10-19 RX ADMIN — INFLUENZA VIRUS VACCINE 0.5 ML: 15; 15; 15; 15 SUSPENSION INTRAMUSCULAR at 08:37

## 2017-10-19 NOTE — ROUTINE PROCESS
Bedside shift change report given to [facility] (oncoming nurse) by Sintia Nunn RN (offgoing nurse). Report included the following information SBAR, Kardex, Procedure Summary, Intake/Output, MAR, Accordion, Recent Results, Med Rec Status and Alarm Parameters .

## 2017-10-19 NOTE — PROGRESS NOTES
Vascular Surgery Progress Note  Magnus Beebe ACNP-BC  10/19/2017 9:22 AM       Subjective:     Ms. Rubia Salmeron is a 68yo AAF with a pmhx significant for PAD, carotid stenosis, COPD, HTN, and chronic pain syndrome who presented to the hospital for an elective right femoral above-knee popliteal bypass graft w PTFE on 10/18/17. She complains of some mild nausea overnight.   She complains of pain to the RLE this am.  She is using her PCA.       Nursing Data:     Patient Vitals for the past 24 hrs:   BP Temp Pulse Resp SpO2 Height Weight   10/19/17 0836 (!) 139/91 - 87 - - - -   10/19/17 0659 97/52 98.4 °F (36.9 °C) 77 18 100 % - -   10/19/17 0304 110/46 98.2 °F (36.8 °C) 76 14 99 % - -   10/18/17 2317 105/51 97.9 °F (36.6 °C) 71 14 99 % - -   10/18/17 2128 105/49 - 72 18 95 % - -   10/18/17 1928 103/49 97.4 °F (36.3 °C) 79 28 99 % - -   10/18/17 1759 123/71 97.9 °F (36.6 °C) 84 17 97 % - -   10/18/17 1756 123/71 - - - - - -   10/18/17 1715 142/65 - 84 20 97 % - -   10/18/17 1700 141/65 - 83 20 95 % - -   10/18/17 1645 148/67 - 89 30 99 % - -   10/18/17 1632 141/61 - 82 24 99 % - -   10/18/17 1615 127/61 - 81 23 99 % - -   10/18/17 1612 140/62 - 80 22 98 % - -   10/18/17 1610 140/62 - 82 25 99 % - -   10/18/17 1605 (!) 124/103 - 85 26 99 % - -   10/18/17 1600 115/60 - 83 21 100 % - -   10/18/17 1555 131/63 - 78 22 100 % - -   10/18/17 1550 130/61 - 79 19 100 % - -   10/18/17 1545 120/58 97.7 °F (36.5 °C) 79 21 95 % - -   10/18/17 1544 118/56 - - - - - -   10/18/17 1416 108/42 - 84 22 99 % - -   10/18/17 1411 108/42 - 84 21 98 % - -   10/18/17 1410 116/42 - 81 24 95 % - -   10/18/17 1355 124/52 - 79 16 99 % - -   10/18/17 1350 132/53 - 79 15 99 % - -   10/18/17 1345 142/58 - 78 16 97 % - -   10/18/17 1324 151/62 98.3 °F (36.8 °C) 80 18 99 % 5' 1\" (1.549 m) 42.9 kg (94 lb 9.2 oz)     ---------------------------------------------------------------------------------------------------------    Intake/Output Summary (Last 24 hours) at 10/19/17 6879  Last data filed at 10/19/17 0851   Gross per 24 hour   Intake           2632.5 ml   Output              880 ml   Net           1752.5 ml       Exam:     Physical Exam   Constitutional: She is oriented to person, place, and time. She appears cachectic. Frail elderly AAF in NAD    HENT:   Head: Normocephalic and atraumatic. Eyes: EOM are normal. Pupils are equal, round, and reactive to light. Neck: Normal range of motion. Cardiovascular: Normal rate and regular rhythm. Murmur heard. Pulses:       Femoral pulses are 2+ on the right side       Popliteal pulses are 2+ on the right side        Dorsalis pedis pulses are 1+ on the right side   Right foot warm to the touch. Pulmonary/Chest: Effort normal and breath sounds normal.   Abdominal: Soft. Bowel sounds are normal.   Musculoskeletal: Normal range of motion. Neurological: She is alert and oriented to person, place, and time. Skin:        Psychiatric: Judgment and thought content normal.       Lab Review:     . No results found for this or any previous visit (from the past 24 hour(s)). XR Results (most recent):    Results from Hospital Encounter encounter on 10/12/17   XR CHEST PA LAT   Narrative Clinical indication: Preop, asthma heart murmur. Frontal and lateral views of the chest obtained, comparison to 2014. Flattening  of the diaphragm with increased lung volume unchanged. The heart size is normal.  There is no acute infiltrate. Prior left shoulder surgery. Impression impression: Hyperinflation no acute findings. Assessment/Plan:      Principal Problem:  PAD s/p right femoral above-knee popliteal bypass graft w PTFE 10/18/17. Carotid stenosis   Chronic pain syndrome w narcotic dependence  Patient doing better this am.  Nausea has improved.   Pain is currently controlled w PCA and home regimen of Percocet PRN, Baclofen PRN, scheduled diclofenac, scheduled gabapentin, scheduled Serooquel, and

## 2017-10-19 NOTE — CDMP QUERY
Dr. Grazyna Khoury MD :    Patient is noted to have a BMI of 17.87. Please clarify if this patient is:     =>Underweight  =>Cachexia  =>Failure to Thrive  =>Other explanation of clinical findings  =>Unable to determine (no explanation for clinical findings)    Presentation: 71 BF w/ PVD s/p fem-pop bypass, 94 lbs = BMI 17.87    Please clarify and document your clinical opinion in the progress notes and discharge summary, including the definitive and or presumptive diagnosis, (suspected or probable), related to the above clinical findings. Please include clinical findings supporting your diagnosis. Thank [email or web address]  [phone]

## 2017-10-20 ENCOUNTER — HOME HEALTH ADMISSION (OUTPATIENT)
Dept: HOME HEALTH SERVICES | Facility: HOME HEALTH | Age: 72
End: 2017-10-20

## 2017-10-20 VITALS
HEART RATE: 70 BPM | TEMPERATURE: 98.3 F | HEIGHT: 61 IN | SYSTOLIC BLOOD PRESSURE: 105 MMHG | BODY MASS INDEX: 17.86 KG/M2 | DIASTOLIC BLOOD PRESSURE: 54 MMHG | WEIGHT: 94.58 LBS | OXYGEN SATURATION: 96 % | RESPIRATION RATE: 16 BRPM

## 2017-10-20 LAB
ANION GAP SERPL CALC-SCNC: 3 MMOL/L (ref 5–15)
BUN SERPL-MCNC: 9 MG/DL (ref 6–20)
BUN/CREAT SERPL: 21 (ref 12–20)
CALCIUM SERPL-MCNC: 7.8 MG/DL (ref 8.5–10.1)
CHLORIDE SERPL-SCNC: 105 MMOL/L (ref 97–108)
CO2 SERPL-SCNC: 30 MMOL/L (ref 21–32)
CREAT SERPL-MCNC: 0.43 MG/DL (ref 0.55–1.02)
ERYTHROCYTE [DISTWIDTH] IN BLOOD BY AUTOMATED COUNT: 13.4 % (ref 11.5–14.5)
GLUCOSE SERPL-MCNC: 124 MG/DL (ref 65–100)
HCT VFR BLD AUTO: 24.9 % (ref 35–47)
HGB BLD-MCNC: 8.2 G/DL (ref 11.5–16)
MCH RBC QN AUTO: 35 PG (ref 26–34)
MCHC RBC AUTO-ENTMCNC: 32.9 G/DL (ref 30–36.5)
MCV RBC AUTO: 106.4 FL (ref 80–99)
PLATELET # BLD AUTO: 137 K/UL (ref 150–400)
POTASSIUM SERPL-SCNC: 3.7 MMOL/L (ref 3.5–5.1)
RBC # BLD AUTO: 2.34 M/UL (ref 3.8–5.2)
SODIUM SERPL-SCNC: 138 MMOL/L (ref 136–145)
WBC # BLD AUTO: 6.2 K/UL (ref 3.6–11)

## 2017-10-20 PROCEDURE — 74011250637 HC RX REV CODE- 250/637: Performed by: SURGERY

## 2017-10-20 PROCEDURE — 36415 COLL VENOUS BLD VENIPUNCTURE: CPT | Performed by: NURSE PRACTITIONER

## 2017-10-20 PROCEDURE — 74011250636 HC RX REV CODE- 250/636: Performed by: SURGERY

## 2017-10-20 PROCEDURE — 85027 COMPLETE CBC AUTOMATED: CPT | Performed by: NURSE PRACTITIONER

## 2017-10-20 PROCEDURE — 74011636637 HC RX REV CODE- 636/637: Performed by: SURGERY

## 2017-10-20 PROCEDURE — 74011250637 HC RX REV CODE- 250/637: Performed by: NURSE PRACTITIONER

## 2017-10-20 PROCEDURE — 80048 BASIC METABOLIC PNL TOTAL CA: CPT | Performed by: NURSE PRACTITIONER

## 2017-10-20 RX ORDER — OXYCODONE AND ACETAMINOPHEN 7.5; 325 MG/1; MG/1
1 TABLET ORAL
Qty: 30 TAB | Refills: 0 | Status: SHIPPED | OUTPATIENT
Start: 2017-10-20 | End: 2017-11-01 | Stop reason: SDUPTHER

## 2017-10-20 RX ORDER — IBUPROFEN 200 MG
1 TABLET ORAL DAILY
Qty: 30 PATCH | Refills: 0 | Status: SHIPPED | OUTPATIENT
Start: 2017-10-20 | End: 2017-11-19

## 2017-10-20 RX ADMIN — FLUOXETINE 40 MG: 20 CAPSULE ORAL at 09:07

## 2017-10-20 RX ADMIN — CLINDAMYCIN PHOSPHATE 300 MG: 6 INJECTION, SOLUTION INTRAVENOUS at 06:00

## 2017-10-20 RX ADMIN — DICLOFENAC SODIUM 75 MG: 75 TABLET, DELAYED RELEASE ORAL at 09:07

## 2017-10-20 RX ADMIN — PREDNISONE 5 MG: 5 TABLET ORAL at 09:07

## 2017-10-20 RX ADMIN — CLOPIDOGREL BISULFATE 75 MG: 75 TABLET ORAL at 09:07

## 2017-10-20 RX ADMIN — GABAPENTIN 300 MG: 300 CAPSULE ORAL at 09:07

## 2017-10-20 RX ADMIN — ASPIRIN 81 MG 81 MG: 81 TABLET ORAL at 09:07

## 2017-10-20 RX ADMIN — AMLODIPINE BESYLATE 5 MG: 5 TABLET ORAL at 09:07

## 2017-10-20 RX ADMIN — FLUTICASONE FUROATE 1 PUFF: 100 POWDER RESPIRATORY (INHALATION) at 09:08

## 2017-10-20 NOTE — PROGRESS NOTES
Problem: Falls - Risk of  Goal: *Absence of Falls  Document Paulino Fall Risk and appropriate interventions in the flowsheet.    Outcome: Progressing Towards Goal  Fall Risk Interventions:  Mobility Interventions: Patient to call before getting OOB, Utilize walker, cane, or other assitive device, Strengthening exercises (ROM-active/passive)         Medication Interventions: Evaluate medications/consider consulting pharmacy, Patient to call before getting OOB, Teach patient to arise slowly    Elimination Interventions: Call light in reach, Patient to call for help with toileting needs, Toileting schedule/hourly rounds    History of Falls Interventions: Bed/chair exit alarm, Consult care management for discharge planning, Door open when patient unattended, Evaluate medications/consider consulting pharmacy, Investigate reason for fall

## 2017-10-20 NOTE — PROGRESS NOTES
CM met with patient for discharge planning. Patient confirmed name and  as pt identifiers. Demographics confirmed. Patient lives alone \"sometimes\". ADL's - patient is independent with ADL's prior to admission. DME - access to a cane and walker    Preferred pharmacy - [facility] and [address] [place] [address]. HH/SNF no previous experience. Care Management Interventions  PCP Verified by CM: Yes  Mode of Transport at Discharge:  Other (see comment) (family)  Discharge Durable Medical Equipment: No (patient has access to cane and walker)  Physical Therapy Consult: No  Occupational Therapy Consult: No  Speech Therapy Consult: No  Current Support Network: Lives Alone (\"sometimes')  Confirm Follow Up Transport: Friends  Plan discussed with Pt/Family/Caregiver: Yes  Discharge Location  Discharge Placement: [facility] [address], [facility]  Ext [phone]

## 2017-10-20 NOTE — ROUTINE PROCESS
CM has completed arrangements for discharge. Discharge brochure provided; Reviewed DC instructions with patient. Opportunity for questions and clarifications was provided; verbalized understanding. Follow-up appointments reviewed/written for patient. MAR reviewed with patient to clarify medications given during hospital stay, today. Pt stable and ambulatory for discharge; family to provide transportation to home. Room checked and all belongings sent with patient. IV removed (without difficulty/pt tolerated well) Telemetry discontinued.

## 2017-10-20 NOTE — PROGRESS NOTES
Cm confirmed with Joseph Morrison RN [facility]. They can provide service for [facility].     Brynn Kincaid RN CM  Ext [phone]

## 2017-10-20 NOTE — DISCHARGE SUMMARY
Vascular Surgery Discharge Summary     Patient ID:  Renato Villafana  [id number]  30 y.o.  1945    Admitting Provider: aCssidy Reza MD    Admit Date: 10/18/2017    Discharge Date: 10/20/2017    Discharge Diagnoses:    PAD POA yes   Carotid Stenosis POA yes  Chronic pain syndrome with narcotic dependence POA yes  COPD with chronic respiratory failure that is steroid dependent POA yes  Hypertensive Disorder POA yes  Severe protein calorie malnutrition POA yes      Procedures:  RIGHT FEMORAL-POPLITEAL BYPASS W. PTFE GRAFT 10/18/2017      Hospital Course:   Ms. Jabier Mckeon is a 70yo AAF with a pmhx significant for PAD, carotid stenosis, COPD, HTN, and chronic pain syndrome who presented to the hospital for an elective right femoral above-knee popliteal bypass graft w PTFE on 10/18/17. She developed some mild post nausea that quickly resolved. The remainder of her post op stay was uneventful. She continues on her ASA and Plavix. Her pain is controlled with her home regimen. Her BP was controlled during admission. Her COPD was stable. She will follow up in the outpatient clinic in 2 weeks.      Pertinent Results:   Recent Results (from the past 24 hour(s))   CBC W/O DIFF    Collection Time: 10/20/17  4:19 AM   Result Value Ref Range    WBC 6.2 3.6 - 11.0 K/uL    RBC 2.34 (L) 3.80 - 5.20 M/uL    HGB 8.2 (L) 11.5 - 16.0 g/dL    HCT 24.9 (L) 35.0 - 47.0 %    .4 (H) 80.0 - 99.0 FL    MCH 35.0 (H) 26.0 - 34.0 PG    MCHC 32.9 30.0 - 36.5 g/dL    RDW 13.4 11.5 - 14.5 %    PLATELET 156 (L) 590 - 770 K/uL   METABOLIC PANEL, BASIC    Collection Time: 10/20/17  4:19 AM   Result Value Ref Range    Sodium 138 136 - 145 mmol/L    Potassium 3.7 3.5 - 5.1 mmol/L    Chloride 105 97 - 108 mmol/L    CO2 30 21 - 32 mmol/L    Anion gap 3 (L) 5 - 15 mmol/L    Glucose 124 (H) 65 - 100 mg/dL    BUN 9 6 - 20 MG/DL    Creatinine 0.43 (L) 0.55 - 1.02 MG/DL    BUN/Creatinine ratio 21 (H) 12 - 20      GFR est AA >60 >60 ml/min/1.73m2 GFR est non-AA >60 >60 ml/min/1.73m2    Calcium 7.8 (L) 8.5 - 10.1 MG/DL         Results from Hospital Encounter encounter on 10/12/17   XR CHEST PA LAT   Narrative Clinical indication: Preop, asthma heart murmur. Frontal and lateral views of the chest obtained, comparison to 2014. Flattening  of the diaphragm with increased lung volume unchanged. The heart size is normal.  There is no acute infiltrate. Prior left shoulder surgery. Impression impression: Hyperinflation no acute findings. Patient Weight:   Last 3 Recorded Weights in this Encounter    10/18/17 1324   Weight: 42.9 kg (94 lb 9.2 oz)     Patient Condition at Discharge: stable    Disposition: home with [facility]     Patient Instructions:   Current Discharge Medication List      START taking these medications    Details   nicotine (NICODERM CQ) 14 mg/24 hr patch 1 Patch by TransDERmal route daily for 30 days. Qty: 30 Patch, Refills: 0         CONTINUE these medications which have CHANGED    Details   oxyCODONE-acetaminophen (PERCOCET) 7.5-325 mg per tablet Take 1 Tab by mouth every four (4) hours as needed for Pain. Max Daily Amount: 6 Tabs. Qty: 30 Tab, Refills: 0    Associated Diagnoses: Polyneuropathy         CONTINUE these medications which have NOT CHANGED    Details   albuterol (PROVENTIL VENTOLIN) 2.5 mg /3 mL (0.083 %) nebulizer solution 1.25 mg by Nebulization route once. predniSONE (DELTASONE) 5 mg tablet Take 1 Tab by mouth two (2) times a day. Qty: 60 Tab, Refills: 0    Associated Diagnoses: Polyneuropathy      FLOVENT  mcg/actuation inhaler INHALE 2 PUFFS BY MOUTH TWICE A DAY  Refills: 2      diclofenac EC (VOLTAREN) 75 mg EC tablet Take  by mouth two (2) times a day. amLODIPine (NORVASC) 5 mg tablet Take 5 mg by mouth daily. FLUoxetine (PROZAC) 40 mg capsule Take 40 mg by mouth daily. QUEtiapine (SEROQUEL) 100 mg tablet 100 mg nightly.       baclofen (LIORESAL) 10 mg tablet TAKE 1 TABLET BY MOUTH AT BEDTIME  Refills: 3      gabapentin (NEURONTIN) 300 mg capsule TAKE ONE CAPSULE BY MOUTH AT BEDTIME  Refills: 2      Aspirin, Buffered 81 mg tab Take  by mouth. clopidogrel (PLAVIX) 75 mg tablet Take 75 mg by mouth daily. Diet: Cardiac Diet  Activity/Wound Care:     Patient may shower on Saturday. Dry the wound carefully. Keep wound clean and dry    Patient should not attempt to drive a car until they are comfortable and NOT taking pain medication. No heavy lifting (20 lbs limit). Mild exercise and light duties around the house are OK. Call the office if the patient has any problems. Follow-up Information     Follow up With Details Comments Contact Info    Lizz Guzman III, MD Go on 10/27/2017 PCP - follow up at 11:30 AM  [address] [place] [address]      Jewels Nazario MD Go on 11/6/2017 Vascular Surgery - follow up at 1:30 PM  [address] [facility]  They will provide a hospital to home visit.  [address] [place] [address]  [phone]          Signed:  Mira Fuentes NP  10/20/2017  1:20 PM

## 2017-10-20 NOTE — ROUTINE PROCESS
follow up appointments scheduled and placed on AVS.   [facility], [address] [place] [address].  [phone]

## 2017-10-20 NOTE — PROGRESS NOTES
PCU SHIFT NURSING NOTE    Shift Summary:   1900: Bedside shift change report given to Ozzie Aldridge RN (oncoming nurse) by Peter Martinez (offgoing nurse). Report included the following information SBAR, Kardex, Intake/Output, MAR, Recent Results and Cardiac Rhythm NSR. Pt denies pain or nausea; surgical sites remain intact c dermabond; less swelling, area softer. Neurovascular intact. 4117: Bradycardia in high 50s noted on telemetry. At bedside, pt deeply asleep but rouses easily to voice. Pt denies dizziness or other sx. HR immediately increased to 75. Pt alert and conversing normally. Will continue to monitor. 0300: Pt appears to be sleeping. NSR. Pt rouses easily for lab work and assessment. Denies pain. Dermabond to incision sites remains intact. 1454: Pt up to toilet x 1 assist c walker. Unmeasurable void. Pt c/o pain with ambulation though otherwise she remained comfortable through the night. 0700: Bedside shift change report given to Esther Mohan RN (oncoming nurse) by Ozzie Aldridge RN (offgoing nurse). Report included the following information SBAR, Kardex, Intake/Output, MAR, Recent Results and Cardiac Rhythm NSR. Admission Date 10/18/2017   Admission Diagnosis ASO RIGHT LEG   PVD (peripheral vascular disease) ([facility] [address]   Consults IP CONSULT TO HOSPITALIST        Consults   []PT   []OT   []Speech   []Case Management      [] Palliative      Cardiac Monitoring Order   [x]Yes   []No     IV drips   [x]Yes    Drip:       PCA              Dose:  Drip:                            Dose:  Drip:                            Dose:   []No     GI Prophylaxis   []Yes   [x]No         DVT Prophylaxis   SCDs:  Sequential Compression Device: Bilateral          Glen stockings:         [] Medication   []Contraindicated   [x]None      Activity Level Activity Level: Chair     Activity Assistance: Partial (one person)   Purposeful Rounding every 1-2 hour?    [x]Yes   Oconnor Score  Total Score: 4   Bed Alarm (If score 3 or >)   [x]Yes   [] Refused (See signed refusal form in chart)   Obey Score  Obey Score: 19   Obey Score (if score 14 or less)   []PMT consult   []Wound Care consult      []Specialty bed   [] Nutrition consult          Needs prior to discharge:   Home O2 required:    []Yes   [x]No    If yes, how much O2 required? Other:    Last Bowel Movement: Last Bowel Movement Date: 10/17/17      Influenza Vaccine Received Flu Vaccine for Current Season (usually Sept-March): No    Patient/Guardian Refused (Notify MD): No   Pneumonia Vaccine           Diet Active Orders   Diet    DIET CARDIAC      LDAs               Peripheral IV 10/18/17 Right Hand (Active)   Site Assessment Clean, dry, & intact 10/19/2017  3:12 PM   Phlebitis Assessment 0 10/19/2017  3:12 PM   Infiltration Assessment 0 10/19/2017  3:12 PM   Dressing Status Clean, dry, & intact 10/19/2017  3:12 PM   Dressing Type Tape;Transparent 10/19/2017  3:12 PM   Hub Color/Line Status Pink;Patent; Infusing 10/19/2017  3:12 PM   Action Taken Open ports on tubing capped 10/19/2017  3:12 PM   Alcohol Cap Used Yes 10/19/2017  3:12 PM                      Urinary Catheter [REMOVED] Urinary Catheter 10/18/17 2- way; Rudd-Criteria for Appropriate Use: Surgical procedure    Intake & Output   Date 10/18/17 1900 - 10/19/17 0659 10/19/17 0700 - 10/20/17 0659   Shift 3893-0048 24 Hour Total 1026-4130 8444-3735 24 Hour Total   I  N  T  A  K  E   I.V.  (mL/kg/hr) 938.8 2632.5 450  (0.9)  450      Volume (lactated Ringers infusion)  1600         Volume (dextrose 5% lactated ringers infusion) 838.8 932.5 197.5  197.5      Volume (0.45% sodium chloride infusion)   202.5  202.5      Volume (clindamycin (CLEOCIN) 300mg D5W 50mL IVPB (premix)) 100 100 50  50    Shift Total  (mL/kg) 938.8  (21.9) 2632.5  (61.4) 450  (10.5)  450  (10.5)   O  U  T  P  U  T   Urine  (mL/kg/hr) 175 650 200  (0.4)  200      Urine Occurrence(s)  1 x 2 x  2 x      Urine Output (mL) ([REMOVED] Urinary Catheter 10/18/17 2- way; Rudd) 175 650 200  200    Blood  30         Estimated Blood Loss  30       Shift Total  (mL/kg) 175  (4.1) 680  (15.9) 200  (4.7)  200  (4.7)   .8 1952.5 250  250   Weight (kg) 42.9 42.9 42.9 42.9 42.9         Readmission Risk Assessment Tool Score Medium Risk            14       Total Score        3 Has Seen PCP in Last 6 Months (Yes=3, No=0)    2 . Living with Significant Other. Assisted Living. LTAC. SNF. or   Rehab    9 Pt.  Coverage (Medicare=5 , Medicaid, or Self-Pay=4)        Criteria that do not apply:    Patient Length of Stay (>5 days = 3)    IP Visits Last 12 Months (1-3=4, 4=9, >4=11)    Charlson Comorbidity Score (Age + Comorbid Conditions)       Expected Length of Stay 4d 4h   Actual Length of Stay 1

## 2017-10-22 ENCOUNTER — HOME CARE VISIT (OUTPATIENT)
Dept: HOME HEALTH SERVICES | Facility: HOME HEALTH | Age: 72
End: 2017-10-22

## 2017-10-23 ENCOUNTER — HOME CARE VISIT (OUTPATIENT)
Dept: SCHEDULING | Facility: HOME HEALTH | Age: 72
End: 2017-10-23

## 2017-10-23 PROCEDURE — G0495 RN CARE TRAIN/EDU IN HH: HCPCS

## 2017-11-01 ENCOUNTER — OFFICE VISIT (OUTPATIENT)
Dept: NEUROLOGY | Age: 72
End: 2017-11-01

## 2017-11-01 VITALS
HEIGHT: 61 IN | BODY MASS INDEX: 18.54 KG/M2 | TEMPERATURE: 98.3 F | WEIGHT: 98.2 LBS | DIASTOLIC BLOOD PRESSURE: 58 MMHG | SYSTOLIC BLOOD PRESSURE: 136 MMHG | HEART RATE: 82 BPM | RESPIRATION RATE: 16 BRPM | OXYGEN SATURATION: 99 %

## 2017-11-01 DIAGNOSIS — R20.2 PARESTHESIA: ICD-10-CM

## 2017-11-01 DIAGNOSIS — G89.4 CHRONIC PAIN SYNDROME: Primary | ICD-10-CM

## 2017-11-01 DIAGNOSIS — Z79.891 USE OF OPIATES FOR THERAPEUTIC PURPOSES: ICD-10-CM

## 2017-11-01 DIAGNOSIS — G62.9 POLYNEUROPATHY: ICD-10-CM

## 2017-11-01 RX ORDER — OXYCODONE AND ACETAMINOPHEN 7.5; 325 MG/1; MG/1
1 TABLET ORAL
Qty: 30 TAB | Refills: 0 | Status: SHIPPED | OUTPATIENT
Start: 2017-12-01 | End: 2018-02-01 | Stop reason: SDUPTHER

## 2017-11-01 RX ORDER — OXYCODONE AND ACETAMINOPHEN 7.5; 325 MG/1; MG/1
1 TABLET ORAL
Qty: 30 TAB | Refills: 0 | Status: SHIPPED | OUTPATIENT
Start: 2017-11-01 | End: 2017-11-01 | Stop reason: SDUPTHER

## 2017-11-01 RX ORDER — ENOXAPARIN SODIUM 100 MG/ML
INJECTION SUBCUTANEOUS
Refills: 0 | COMMUNITY
Start: 2017-07-27 | End: 2021-01-13

## 2017-11-01 NOTE — MR AVS SNAPSHOT
Visit Information Date & Time Provider Department Dept. Phone Encounter #  
 11/1/2017 10:40 AM Ang Acosta MD [facility] [phone] [id number] Follow-up Instructions Return in about 3 months (around 2/1/2018). Upcoming Health Maintenance Date Due Hepatitis C Screening 1945 DTaP/Tdap/Td series (1 - Tdap) 12/3/1967 BREAST CANCER SCRN MAMMOGRAM 12/3/1996 FOBT Q 1 YEAR AGE 50-75 12/3/1996 ZOSTER VACCINE AGE 60> 10/3/2006 GLAUCOMA SCREENING Q2Y 12/3/2011 OSTEOPOROSIS SCREENING (DEXA) 12/3/2011 Pneumococcal 65+ Low/Medium Risk (1 of 2 - PCV13) 12/3/2011 MEDICARE YEARLY EXAM 12/3/2011 Allergies as of 11/1/2017  Review Complete On: 11/1/2017 By: Sheree Houser MD  
  
 Severity Noted Reaction Type Reactions Penicillins High 08/15/2014   Systemic Angioedema Ampicillin Medium 08/15/2014   Systemic Itching Tramadol  10/21/2015    Itching Current Immunizations  Reviewed on 8/16/2014 Name Date Influenza Vaccine (Quad) PF 10/19/2017  8:37 AM  
  
 Not reviewed this visit You Were Diagnosed With   
  
 Codes Comments Chronic pain syndrome    -  Primary ICD-10-CM: G89.4 ICD-9-CM: 338.4 Polyneuropathy     ICD-10-CM: G62.9 ICD-9-CM: 356.9 Use of opiates for therapeutic purposes     ICD-10-CM: Z79.891 ICD-9-CM: V58.69 Paresthesia     ICD-10-CM: R20.2 ICD-9-CM: 787. 0 Vitals BP Pulse Temp Resp Height(growth percentile) 136/58 (BP 1 Location: Right arm, BP Patient Position: Sitting) 82 98.3 °F (36.8 °C) (Temporal) 16 5' 1\" (1.549 m) Weight(growth percentile) SpO2 BMI OB Status Smoking Status 98 lb 3.2 oz (44.5 kg) 99% 18.55 kg/m2 Postmenopausal Current Every Day Smoker BMI and BSA Data Body Mass Index Body Surface Area 18.55 kg/m 2 1.38 m 2 Preferred Pharmacy Pharmacy Name Phone [id number], [place] - [address] [phone] Your Updated Medication List  
  
   
This list is accurate as of: 11/1/17 11:07 AM.  Always use your most recent med list.  
  
  
  
  
 albuterol 2.5 mg /3 mL (0.083 %) nebulizer solution Commonly known as:  PROVENTIL VENTOLIN  
1.25 mg by Nebulization route once. amLODIPine 5 mg tablet Commonly known as:  [place] Alexa Take 5 mg by mouth daily. aspirin, buffered 81 mg Tab Take  by mouth. baclofen 10 mg tablet Commonly known as:  LIORESAL  
TAKE 1 TABLET BY MOUTH AT BEDTIME  
  
 diclofenac EC 75 mg EC tablet Commonly known as:  VOLTAREN Take  by mouth two (2) times a day. enoxaparin 40 mg/0.4 mL Commonly known as:  LOVENOX INJECT 1 SYRINGE DAILY FOR 3 DAYS  
  
 FLOVENT  mcg/actuation inhaler Generic drug:  fluticasone INHALE 2 PUFFS BY MOUTH TWICE A DAY FLUoxetine 40 mg capsule Commonly known as:  PROzac Take 40 mg by mouth daily. gabapentin 300 mg capsule Commonly known as:  NEURONTIN  
TAKE ONE CAPSULE BY MOUTH AT BEDTIME  
  
 nicotine 14 mg/24 hr patch Commonly known as:  NICODERM CQ  
1 Patch by TransDERmal route daily for 30 days. oxyCODONE-acetaminophen 7.5-325 mg per tablet Commonly known as:  PERCOCET Take 1 Tab by mouth every four (4) hours as needed for Pain. Max Daily Amount: 6 Tabs. Start taking on:  12/1/2017 PLAVIX 75 mg Tab Generic drug:  clopidogrel Take 75 mg by mouth daily. predniSONE 5 mg tablet Commonly known as:  Anna Cheikh Take 1 Tab by mouth two (2) times a day. QUEtiapine 100 mg tablet Commonly known as:  SEROquel 100 mg nightly. Prescriptions Printed Refills  
 oxyCODONE-acetaminophen (PERCOCET) 7.5-325 mg per tablet 0 Starting on: 12/1/2017 Sig: Take 1 Tab by mouth every four (4) hours as needed for Pain. Max Daily Amount: 6 Tabs. Class: Print Route: Oral  
  
We Performed the Following [address] MONITORING [[id number] Custom] Follow-up Instructions Return in about 3 months (around 2/1/2018). Introducing [facility]! [facility] introduces [facility] patient portal. Now you can access parts of your medical record, email your doctor's office, and request medication refills online. 1. In your internet browser, go to [email or web address] 2. Click on the First Time User? Click Here link in the Sign In box. You will see the New Member Sign Up page. 3. Enter your [facility] Access Code exactly as it appears below. You will not need to use this code after youve completed the sign-up process. If you do not sign up before the expiration date, you must request a new code. · [facility] [employer] Access Code: [email or web address] Expires: 1/30/2018 10:21 AM 
 
4. Enter the last four digits of your Social Security Number (xxxx) and Date of Birth (mm/dd/yyyy) as indicated and click Submit. You will be taken to the next sign-up page. 5. Create a [facility] ID. This will be your [facility] login ID and cannot be changed, so think of one that is secure and easy to remember. 6. Create a [facility] password. You can change your password at any time. 7. Enter your Password Reset Question and Answer. This can be used at a later time if you forget your password. 8. Enter your e-mail address. You will receive e-mail notification when new information is available in [address] 9. Click Sign Up. You can now view and download portions of your medical record. 10. Click the Download Summary menu link to download a portable copy of your medical information. If you have questions, please visit the Frequently Asked Questions section of the [facility] [employer] website. Remember, [facility] is NOT to be used for urgent needs. For medical emergencies, dial 911. Now available from your iPhone and Android! Please provide this summary of care documentation to your next provider. Your primary care clinician is listed as Michell Delgado If you have any questions after today's visit, please call [phone].

## 2017-11-01 NOTE — PROGRESS NOTES
Chief Complaint   Patient presents with    Other     Polyneuropathy    Medication Refill     1. Have you been to the ER, urgent care clinic since your last visit? Hospitalized since your last visit? [facility] 10/18/17    2. Have you seen or consulted any other health care providers outside of the [address] [place] [address] since your last visit? Include any pap smears or colon screening. No    Pill count not performed, patient did not bring medications     Pill count not verified with patient  Patient reports taking medication    UDS obtained and sent   Pain contract in file   made available for  Dr. Arta Goldberg given for Percocet. Patient made aware these are the last 2 prescriptions for pain medication.

## 2017-11-01 NOTE — PROGRESS NOTES
Neurology Progress Note    NAME:  Stephanie Abdul   :   1945   MRN:   [id number]     Date/Time:  2017  Subjective:      Stephanie Abdul is a 80 y.o. female here today for follow up for neuropathy, and pain  Says she had Catheterization on the right groin  apparently for PVD on 10/18/17, says she still has pain from it. Patient noted that  she had falls two times because her legs gave out  on her but did not hit head or lost consciousness. She said that she has been having a lot of pain in the legs , pain is sharp in nature and aggravated by activity  Says she has been having intermittent dysphagia, while in the hospital , she was having problem swallowing her medication, dysphagia mostly with solid food , patient is advised to see a GI specialist through her PCP. Review of Systems - General ROS: positive for  - fatigue, night sweats and weight loss  Psychological ROS: positive for - anxiety  Ophthalmic ROS: positive for - blurry vision and decreased vision  ENT ROS: positive for - headaches and tinnitus  Allergy and Immunology ROS: negative  Hematological and Lymphatic ROS: negative  Endocrine ROS: negative  Respiratory ROS: no cough, shortness of breath, or wheezing  Cardiovascular ROS: no chest pain or dyspnea on exertion  Gastrointestinal ROS: no abdominal pain, change in bowel habits, or black or bloody stools  Genito-Urinary ROS: no dysuria, trouble voiding, or hematuria  Musculoskeletal ROS: positive for - gait disturbance, joint pain, joint stiffness, muscle pain and muscular weakness  Neurological ROS: positive for - dizziness, gait disturbance, impaired coordination/balance, numbness/tingling and weakness  Dermatological ROS: negative       Medications reviewed:  Current Outpatient Prescriptions   Medication Sig Dispense Refill    nicotine (NICODERM CQ) 14 mg/24 hr patch 1 Patch by TransDERmal route daily for 30 days.  30 Patch 0    oxyCODONE-acetaminophen (PERCOCET) 7.5-325 mg per tablet Take 1 Tab by mouth every four (4) hours as needed for Pain. Max Daily Amount: 6 Tabs. 30 Tab 0    amLODIPine (NORVASC) 5 mg tablet Take 5 mg by mouth daily.  albuterol (PROVENTIL VENTOLIN) 2.5 mg /3 mL (0.083 %) nebulizer solution 1.25 mg by Nebulization route once.  predniSONE (DELTASONE) 5 mg tablet Take 1 Tab by mouth two (2) times a day. 60 Tab 0    FLUoxetine (PROZAC) 40 mg capsule Take 40 mg by mouth daily.  QUEtiapine (SEROQUEL) 100 mg tablet 100 mg nightly.  baclofen (LIORESAL) 10 mg tablet TAKE 1 TABLET BY MOUTH AT BEDTIME  3    FLOVENT  mcg/actuation inhaler INHALE 2 PUFFS BY MOUTH TWICE A DAY  2    gabapentin (NEURONTIN) 300 mg capsule TAKE ONE CAPSULE BY MOUTH AT BEDTIME  2    diclofenac EC (VOLTAREN) 75 mg EC tablet Take  by mouth two (2) times a day.  Aspirin, Buffered 81 mg tab Take  by mouth.  clopidogrel (PLAVIX) 75 mg tablet Take 75 mg by mouth daily.       enoxaparin (LOVENOX) 40 mg/0.4 mL INJECT 1 SYRINGE DAILY FOR 3 DAYS  0        Objective:   Vitals:  Vitals:    11/01/17 1022   BP: 136/58   Pulse: 82   Resp: 16   Temp: 98.3 °F (36.8 °C)   TempSrc: Temporal   SpO2: 99%   Weight: 98 lb 3.2 oz (44.5 kg)   Height: 5' 1\" (1.549 m)   PainSc:   7   PainLoc: Leg               Lab Data Reviewed:  Lab Results   Component Value Date/Time    WBC 6.2 10/20/2017 04:19 AM    HCT 24.9 10/20/2017 04:19 AM    HGB 8.2 10/20/2017 04:19 AM    PLATELET 302 27/75/4451 04:19 AM       Lab Results   Component Value Date/Time    Sodium 138 10/20/2017 04:19 AM    Potassium 3.7 10/20/2017 04:19 AM    Chloride 105 10/20/2017 04:19 AM    CO2 30 10/20/2017 04:19 AM    Glucose 124 10/20/2017 04:19 AM    BUN 9 10/20/2017 04:19 AM    Creatinine 0.43 10/20/2017 04:19 AM    Calcium 7.8 10/20/2017 04:19 AM       No components found for: TROPQUANT    No results found for: BRENDEN      No results found for: HBA1C, HGBE8, TTM1BOTY, YKH5RXQM     No results found for: B12LT, FOL, RBCF    No results found for: BRENDEN, ANARX, ANAIGG, XBANA    No results found for: CHOL, CHOLPOCT, CHOLX, CHLST, CHOLV, HDL, HDLPOC, LDL, LDLCPOC, LDLC, DLDLP, VLDLC, VLDL, TGLX, TRIGL, TRIGP, TGLPOCT, CHHD, CHHDX      CT Results (recent):    Results from Abstract encounter on 04/19/17   CT ABD PELV W CONT    MRI Results (recent):      IR Results (recent):      VAS/US Results (recent):  No results found for this or any previous visit. PHYSICAL EXAM:  General:    Alert, cooperative, no distress, appears stated age. Head:   Normocephalic, without obvious abnormality, atraumatic. Eyes:   Conjunctivae/corneas clear. PERRLA  Nose:  Nares normal. No drainage or sinus tenderness. Throat:    Lips, mucosa, and tongue normal.  No Thrush  Neck:  Supple, symmetrical,  no adenopathy, thyroid: non tender    no carotid bruit and no JVD. Back:    Symmetric,  No CVA tenderness. Lungs:   Clear to auscultation bilaterally. No Wheezing or Rhonchi. No rales. Chest wall:  No tenderness or deformity. No Accessory muscle use. Heart:   Regular rate and rhythm,  no murmur, rub or gallop. Abdomen:   Soft, non-tender. Not distended. Bowel sounds normal. No masses  Extremities: Extremities normal, atraumatic, No cyanosis. No edema. No clubbing  Skin:     Texture, turgor normal. No rashes or lesions. Not Jaundiced. Tenderness right calf  Lymph nodes: Cervical, supraclavicular normal.  Psych:  Good insight. Not depressed. Not anxious or agitated. NEUROLOGICAL EXAM:  Appearance: The patient is well developed, well nourished, provides a coherent history and is in no acute distress. Mental Status: Oriented to time, place and person. Mood and affect appropriate. Cranial Nerves:   Intact visual fields. Fundi are benign. MARIA DOLORES, EOM's full, no nystagmus, no ptosis. Facial sensation is normal. Corneal reflexes are intact. Facial movement is symmetric. Hearing is normal bilaterally.  Palate is midline with normal sternocleidomastoid and trapezius muscles are normal. Tongue is midline. Motor:  5/5 strength in upper and 4+/5 lower proximal and distal muscles. Normal bulk and tone. No fasciculations. Reflexes:   Deep tendon reflexes 2+/4 and symmetrical.   Sensory:   Decreased sensation to touch, pinprick and vibration. Gait:   Abnormal gait. Ambulates with cane   Tremor:   Mild tremor noted. Cerebellar:  No cerebellar signs present. Neurovascular:  Normal heart sounds and regular rhythm, peripheral pulses intact, and no carotid bruits. Assesment  1. Polyneuropathy    - COMPLIANCE DRUG SCREEN/PRESCRIPTION MONITORING  - oxyCODONE-acetaminophen (PERCOCET) 7.5-325 mg per tablet; Take 1 Tab by mouth every four (4) hours as needed for Pain. Max Daily Amount: 6 Tabs. Dispense: 30 Tab; Refill: 0    2. Chronic pain syndrome    - COMPLIANCE DRUG SCREEN/PRESCRIPTION MONITORING    3. Use of opiates for therapeutic purposes    - COMPLIANCE DRUG SCREEN/PRESCRIPTION MONITORING    4. Paresthesia      ___________________________________________________  PLAN:Patient advised to look for pain specialist  If she would continue with pain medication  Advised on the dangers of tobacco abuse  Advised on the benefits of discontinuation   Advised on available treatments. 10 minutes spent on counseling. ICD-10-CM ICD-9-CM    1. Chronic pain syndrome G89.4 338.4 COMPLIANCE DRUG SCREEN/PRESCRIPTION MONITORING   2. Polyneuropathy G62.9 356.9 COMPLIANCE DRUG SCREEN/PRESCRIPTION MONITORING      oxyCODONE-acetaminophen (PERCOCET) 7.5-325 mg per tablet   3. Use of opiates for therapeutic purposes Z79.891 V58.69 COMPLIANCE DRUG SCREEN/PRESCRIPTION MONITORING   4. Paresthesia R20.2 782.0      Follow-up Disposition:  Return in about 3 months (around 2/1/2018).            ___________________________________________________    Total time spent with patient:  []15   []25   []35   [] __ minutes    Care Plan discussed with:    [x]Patient   []Family    []Care Manager []Consultant/Specialist :    ___________________________________________________    Attending Physician: Jonn Devine MD

## 2017-11-11 LAB — DRUGS UR: NORMAL

## 2017-11-20 ENCOUNTER — TELEPHONE (OUTPATIENT)
Dept: NEUROLOGY | Age: 72
End: 2017-11-20

## 2017-11-20 NOTE — TELEPHONE ENCOUNTER
Returned patient's call, ID verifeid times 2. Patient stated she has back pain and has completed the Percocet that Dr. Sreekanth Harris gave her. Patient made aware she should see her PCP or go to urgent care.

## 2017-12-19 ENCOUNTER — HOSPITAL ENCOUNTER (OUTPATIENT)
Dept: GENERAL RADIOLOGY | Age: 72
Discharge: HOME | End: 2017-12-19
Payer: MEDICARE

## 2017-12-19 DIAGNOSIS — M54.2 CERVICALGIA: ICD-10-CM

## 2017-12-19 PROCEDURE — 72050 X-RAY EXAM NECK SPINE 4/5VWS: CPT

## 2018-01-17 ENCOUNTER — HOSPITAL ENCOUNTER (OUTPATIENT)
Dept: MRI IMAGING | Age: 73
Discharge: HOME | End: 2018-01-17
Attending: PREVENTIVE MEDICINE
Payer: MEDICARE

## 2018-01-17 DIAGNOSIS — M47.812 CERVICAL SPONDYLOSIS WITHOUT MYELOPATHY: ICD-10-CM

## 2018-01-17 PROCEDURE — 72141 MRI NECK SPINE W/O DYE: CPT

## 2018-02-01 ENCOUNTER — OFFICE VISIT (OUTPATIENT)
Dept: NEUROLOGY | Age: 73
End: 2018-02-01

## 2018-02-01 VITALS
TEMPERATURE: 98.1 F | WEIGHT: 95.8 LBS | DIASTOLIC BLOOD PRESSURE: 86 MMHG | RESPIRATION RATE: 18 BRPM | SYSTOLIC BLOOD PRESSURE: 133 MMHG | BODY MASS INDEX: 18.09 KG/M2 | HEART RATE: 90 BPM | OXYGEN SATURATION: 97 % | HEIGHT: 61 IN

## 2018-02-01 DIAGNOSIS — M79.605 LEFT LEG PAIN: Primary | ICD-10-CM

## 2018-02-01 DIAGNOSIS — M79.18 MYOFASCIAL MUSCLE PAIN: ICD-10-CM

## 2018-02-01 DIAGNOSIS — G62.9 POLYNEUROPATHY: ICD-10-CM

## 2018-02-01 RX ORDER — OXYCODONE AND ACETAMINOPHEN 7.5; 325 MG/1; MG/1
1 TABLET ORAL
Qty: 30 TAB | Refills: 0 | Status: SHIPPED | OUTPATIENT
Start: 2018-02-01 | End: 2019-02-25 | Stop reason: SDUPTHER

## 2018-02-01 RX ORDER — GABAPENTIN 300 MG/1
300 CAPSULE ORAL 2 TIMES DAILY
Qty: 60 CAP | Refills: 2 | Status: SHIPPED | OUTPATIENT
Start: 2018-02-01 | End: 2019-07-05 | Stop reason: DRUGHIGH

## 2018-02-01 RX ORDER — PREDNISONE 5 MG/1
5 TABLET ORAL 2 TIMES DAILY
Qty: 60 TAB | Refills: 0 | Status: SHIPPED | OUTPATIENT
Start: 2018-02-01 | End: 2018-03-01 | Stop reason: SDUPTHER

## 2018-02-01 NOTE — PROGRESS NOTES
Neurology Progress Note    NAME:  Mina De La Garza   :   1945   MRN:   [id number]     Date/Time:  2018  Subjective:      Mina De La Garza is a 71 y.o. female here today for follow up for neuropathy, left leg pain. Says she had a fall without LOC, apparently patient tripped , le gave out and she fell, subsequently experiencing ankle and leg pain. She reports pain in the back mostly the left side of the back, pain is sharp in nature and goes down the legs , pain is constant and made worse by activity. She says she has difficulty walking. Jocelynn Rhoades is following pain management and orthopedic. Thee is numbness and tingling sensation of the extremities. .She denies incontinence. MRI Cervical spine done on 18 showed progression of foraminal stenosis C3-C6.  She will benefit from Physical therapy but hat will be after Orthopedic evaluation  Review of Systems - General ROS: positive for  - fatigue and sleep disturbance  Psychological ROS: positive for - anxiety and sleep disturbances  Ophthalmic ROS: positive for - blurry vision and dry eyes  ENT ROS: positive for - headaches, tinnitus and vertigo  Allergy and Immunology ROS: negative  Hematological and Lymphatic ROS: negative  Endocrine ROS: negative  Respiratory ROS: no cough, shortness of breath, or wheezing  Cardiovascular ROS: no chest pain or dyspnea on exertion  Gastrointestinal ROS: no abdominal pain, change in bowel habits, or black or bloody stools  Genito-Urinary ROS: no dysuria, trouble voiding, or hematuria  Musculoskeletal ROS: positive for - gait disturbance, joint pain, joint stiffness, joint swelling, muscle pain and muscular weakness  Neurological ROS: positive for - dizziness, gait disturbance, headaches, impaired coordination/balance, numbness/tingling and weakness  Dermatological ROS: negative    Medications reviewed:  Current Outpatient Prescriptions   Medication Sig Dispense Refill    oxyCODONE-acetaminophen (PERCOCET) 7.5-325 mg per tablet Take 1 Tab by mouth every four (4) hours as needed for Pain. Max Daily Amount: 6 Tabs. 30 Tab 0    amLODIPine (NORVASC) 5 mg tablet Take 5 mg by mouth daily.  albuterol (PROVENTIL VENTOLIN) 2.5 mg /3 mL (0.083 %) nebulizer solution 1.25 mg by Nebulization route once.  predniSONE (DELTASONE) 5 mg tablet Take 1 Tab by mouth two (2) times a day. 60 Tab 0    FLUoxetine (PROZAC) 40 mg capsule Take 40 mg by mouth daily.  QUEtiapine (SEROQUEL) 100 mg tablet 100 mg nightly.  baclofen (LIORESAL) 10 mg tablet TAKE 1 TABLET BY MOUTH AT BEDTIME  3    FLOVENT  mcg/actuation inhaler INHALE 2 PUFFS BY MOUTH TWICE A DAY  2    gabapentin (NEURONTIN) 300 mg capsule TAKE ONE CAPSULE BY MOUTH AT BEDTIME  2    diclofenac EC (VOLTAREN) 75 mg EC tablet Take  by mouth two (2) times a day.  Aspirin, Buffered 81 mg tab Take  by mouth.  clopidogrel (PLAVIX) 75 mg tablet Take 75 mg by mouth daily.       enoxaparin (LOVENOX) 40 mg/0.4 mL INJECT 1 SYRINGE DAILY FOR 3 DAYS  0        Objective:   Vitals:  Vitals:    02/01/18 1102   BP: 133/86   Pulse: 90   Resp: 18   Temp: 98.1 °F (36.7 °C)   TempSrc: Temporal   SpO2: 97%   Weight: 95 lb 12.8 oz (43.5 kg)   Height: 5' 1\" (1.549 m)   PainSc:  10 - Worst pain ever   PainLoc: Generalized     Lab Data Reviewed:  Lab Results   Component Value Date/Time    WBC 6.2 10/20/2017 04:19 AM    HCT 24.9 10/20/2017 04:19 AM    HGB 8.2 10/20/2017 04:19 AM    PLATELET 663 92/80/5717 04:19 AM       Lab Results   Component Value Date/Time    Sodium 138 10/20/2017 04:19 AM    Potassium 3.7 10/20/2017 04:19 AM    Chloride 105 10/20/2017 04:19 AM    CO2 30 10/20/2017 04:19 AM    Glucose 124 10/20/2017 04:19 AM    BUN 9 10/20/2017 04:19 AM    Creatinine 0.43 10/20/2017 04:19 AM    Calcium 7.8 10/20/2017 04:19 AM       No components found for: TROPQUANT    No results found for: BRENDEN      No results found for: HBA1C, HGBE8, ITI2VTSQ, LQF0FQON     No results found for: B12LT, FOL, RBCF    No results found for: BRENDEN, ANARX, ANAIGG, XBANA    No results found for: CHOL, CHOLPOCT, CHOLX, CHLST, CHOLV, HDL, HDLPOC, LDL, LDLCPOC, LDLC, DLDLP, VLDLC, VLDL, TGLX, TRIGL, TRIGP, TGLPOCT, CHHD, CHHDX      CT Results (recent):    Results from Abstract encounter on 04/19/17   CT ABD PELV W CONT    MRI Results (recent):    Results from [facility] encounter on 01/17/18   MRI CERV SPINE WO CONT   Narrative EXAM:  MRI CERV SPINE WO CONT    INDICATION:   Bilateral finger swelling    COMPARISON: 5/20/2009    TECHNIQUE: MR imaging of the cervical spine was performed including sagittal T1,  T2, STIR;  axial GRE, T2, T1. Contrast was not administered. FINDINGS:    No significant cervical malalignment. Progressive moderate to severe  degenerative disc disease from C4-5 through C6-7. No evidence for acute fracture  or focal destructive bone marrow process. Craniocervical junction is  unremarkable. No signal abnormality of the cervical spinal cord. C2/3:  No disc abnormality or central stenosis. Mild left and no right neural  foraminal stenosis    C3/4:  Tiny disc bulge causing no central stenosis. Moderate right and mild to  moderate left neural foraminal stenosis    C4/5:  Disc osteophyte complex with mild central canal narrowing. Moderate right  and mild to moderate left neural foraminal stenosis    C5/6:  Disc osteophyte complex and ligamentum flavum hypertrophy with mild to  moderate central stenosis. Moderate left and mild to moderate right neural  foraminal stenosis    C6/7:  Disc osteophyte complex with mild central stenosis. No neural foraminal  stenosis    C7/T1:  Small posterior central superior disc extrusion causing no significant  central spinal canal or neural foraminal stenosis         Impression IMPRESSION:  Progressive neural foraminal stenosis from C3-4 through C5-6.  No  other significant change              IR Results (recent):      VAS/US Results (recent):  No results found for this or any previous visit. PHYSICAL EXAM:  General:    Alert, cooperative, no distress, appears stated age. Head:   Normocephalic, without obvious abnormality, atraumatic. Eyes:   Conjunctivae/corneas clear. PERRLA  Nose:  Nares normal. No drainage or sinus tenderness. Throat:    Lips, mucosa, and tongue normal.  No Thrush  Neck:  Supple, symmetrical,  no adenopathy, thyroid: non tender    no carotid bruit and no JVD. Back:    Symmetric,  No CVA tenderness. Lungs:   Clear to auscultation bilaterally. No Wheezing or Rhonchi. No rales. Chest wall:  No tenderness or deformity. No Accessory muscle use. Heart:   Regular rate and rhythm,  no murmur, rub or gallop. Abdomen:   Soft, non-tender. Not distended. Bowel sounds normal. No masses  Extremities: Extremities normal, atraumatic, No cyanosis. No edema. No clubbing  Skin:     Texture, turgor normal. No rashes or lesions. Not Jaundiced  Lymph nodes: Cervical, supraclavicular normal.  Psych:  Good insight. Not depressed. Not anxious or agitated. NEUROLOGICAL EXAM:  Appearance: The patient is well developed, well nourished, provides a coherent history and is in no acute distress. Mental Status: Oriented to time, place and person. Mood and affect appropriate. Cranial Nerves:   Intact visual fields. Fundi are benign. MARIA DOLORES, EOM's full, no nystagmus, no ptosis. Facial sensation is normal. Corneal reflexes are intact. Facial movement is symmetric. Hearing is normal bilaterally. Palate is midline with normal sternocleidomastoid and trapezius muscles are normal. Tongue is midline. Motor:  5/5 strength in left upper and  5-/5 left lower proximal and distal muscles. Normal bulk and tone. No fasciculations. Reflexes:   Deep tendon reflexes 2+/4 and symmetrical.   Sensory:   Dysesthesia to touch, pinprick and vibration. Gait:  Unsteady  gait. Tremor:   Mild tremor noted. Cerebellar:  No cerebellar signs present.    Neurovascular:  Normal heart sounds and regular rhythm, peripheral pulses intact, and no carotid bruits. Assesment  1. Polyneuropathy    - predniSONE (DELTASONE) 5 mg tablet; Take 1 Tab by mouth two (2) times a day. Dispense: 60 Tab; Refill: 0    2. Left leg pain  Prednisone taper    3. Myofascial muscle pain  Following pain management    ___________________________________________________  PLAN:Medication reviewed with patient      ICD-10-CM ICD-9-CM    1. Left leg pain M79.605 729.5    2. Polyneuropathy G62.9 356.9 predniSONE (DELTASONE) 5 mg tablet   3. Myofascial muscle pain M79.1 729.1      Follow-up Disposition:  Return in about 3 months (around 5/1/2018).            ___________________________________________________    Total time spent with patient:  []15   []25   []35   [] __ minutes    Care Plan discussed with:    []Patient   []Family    []Care Manager   []Consultant/Specialist :    ___________________________________________________    Attending Physician: Patricio Mak MD

## 2018-02-01 NOTE — MR AVS SNAPSHOT
[facility] 
 
 
 [address] [place] [address] 
[phone] Patient: Adriane Reed MRN: [id number] :1945 Visit Information Date & Time Provider Department Dept. Phone Encounter #  
 2018 11:40 AM MD [facility] at [address] [place] [address] [id number] Follow-up Instructions Return in about 3 months (around 2018). Your Appointments 2018 11:40 AM  
Follow Up with MD [facility] at [facility]) Appt Note: fuv  
 [address] [facility] Upcoming Health Maintenance Date Due Hepatitis C Screening 1945 DTaP/Tdap/Td series (1 - Tdap) 12/3/1967 BREAST CANCER SCRN MAMMOGRAM 12/3/1996 FOBT Q 1 YEAR AGE 50-75 12/3/1996 ZOSTER VACCINE AGE 60> 10/3/2006 GLAUCOMA SCREENING Q2Y 12/3/2011 OSTEOPOROSIS SCREENING (DEXA) 12/3/2011 Pneumococcal 65+ Low/Medium Risk (1 of 2 - PCV13) 12/3/2011 MEDICARE YEARLY EXAM 12/3/2011 Allergies as of 2018  Review Complete On: 2018 By: Hemant Corrales MD  
  
 Severity Noted Reaction Type Reactions Penicillins High 08/15/2014   Systemic Angioedema Ampicillin Medium 08/15/2014   Systemic Itching Tramadol  10/21/2015    Itching Current Immunizations  Reviewed on 2014 Name Date Influenza Vaccine (Quad) PF 10/19/2017  8:37 AM  
  
 Not reviewed this visit You Were Diagnosed With   
  
 Codes Comments Left leg pain    -  Primary ICD-10-CM: M79.605 ICD-9-CM: 729.5 Polyneuropathy     ICD-10-CM: G62.9 ICD-9-CM: 356.9 Myofascial muscle pain     ICD-10-CM: M79.1 ICD-9-CM: 729.1 Vitals BP Pulse Temp Resp Height(growth percentile)  133/86 (BP 1 Location: Left arm, BP Patient Position: Sitting) 90 98.1 °F (36.7 °C) (Temporal) 18 5' 1\" (1.549 m) Weight(growth percentile) SpO2 BMI OB Status Smoking Status 95 lb 12.8 oz (43.5 kg) 97% 18.1 kg/m2 Postmenopausal Current Every Day Smoker BMI and BSA Data Body Mass Index Body Surface Area  
 18.1 kg/m 2 1.37 m 2 Preferred Pharmacy Pharmacy Name Phone [facility]ARMACY [facility][id number], [place] - [address] [phone] Your Updated Medication List  
  
   
This list is accurate as of: 2/1/18 11:22 AM.  Always use your most recent med list.  
  
  
  
  
 albuterol 2.5 mg /3 mL (0.083 %) nebulizer solution Commonly known as:  PROVENTIL VENTOLIN  
1.25 mg by Nebulization route once. amLODIPine 5 mg tablet Commonly known as:  Allena Michelle Take 5 mg by mouth daily. aspirin, buffered 81 mg Tab Take  by mouth. baclofen 10 mg tablet Commonly known as:  LIORESAL  
TAKE 1 TABLET BY MOUTH AT BEDTIME  
  
 diclofenac EC 75 mg EC tablet Commonly known as:  VOLTAREN Take  by mouth two (2) times a day. enoxaparin 40 mg/0.4 mL Commonly known as:  LOVENOX INJECT 1 SYRINGE DAILY FOR 3 DAYS  
  
 FLOVENT  mcg/actuation inhaler Generic drug:  fluticasone INHALE 2 PUFFS BY MOUTH TWICE A DAY FLUoxetine 40 mg capsule Commonly known as:  PROzac Take 40 mg by mouth daily. gabapentin 300 mg capsule Commonly known as:  NEURONTIN Take 1 Cap by mouth two (2) times a day. oxyCODONE-acetaminophen 7.5-325 mg per tablet Commonly known as:  PERCOCET Take 1 Tab by mouth every four (4) hours as needed for Pain. Max Daily Amount: 6 Tabs. PLAVIX 75 mg Tab Generic drug:  clopidogrel Take 75 mg by mouth daily. predniSONE 5 mg tablet Commonly known as:  Jozef Lies Take 1 Tab by mouth two (2) times a day. QUEtiapine 100 mg tablet Commonly known as:  SEROquel 100 mg nightly. Prescriptions Printed Refills oxyCODONE-acetaminophen (PERCOCET) 7.5-325 mg per tablet 0 Sig: Take 1 Tab by mouth every four (4) hours as needed for Pain. Max Daily Amount: 6 Tabs. Class: Print Route: Oral  
  
Prescriptions Sent to Pharmacy Refills  
 predniSONE (DELTASONE) 5 mg tablet 0 Sig: Take 1 Tab by mouth two (2) times a day. Class: Normal  
 Pharmacy: [facility]/pharmacy [id number] [facility] [address] Ph #: [phone] Route: Oral  
 gabapentin (NEURONTIN) 300 mg capsule 2 Sig: Take 1 Cap by mouth two (2) times a day. Class: Normal  
 Pharmacy: [facility]/pharmacy [id number] [facility] [address] Ph #: [phone] Route: Oral  
  
Follow-up Instructions Return in about 3 months (around 5/1/2018). Introducing [facility]! [employer] introduces [employer] patient portal. Now you can access parts of your medical record, email your doctor's office, and request medication refills online. 1. In your internet browser, go to [email or web address] 2. Click on the First Time User? Click Here link in the Sign In box. You will see the New Member Sign Up page. 3. Enter your [employer] Access Code exactly as it appears below. You will not need to use this code after youve completed the sign-up process. If you do not sign up before the expiration date, you must request a new code. · [employer] Access Code: [email or web address] Expires: 5/2/2018 11:16 AM 
 
4. Enter the last four digits of your Social Security Number (xxxx) and Date of Birth (mm/dd/yyyy) as indicated and click Submit. You will be taken to the next sign-up page. 5. Create a [employer] ID. This will be your [employer] login ID and cannot be changed, so think of one that is secure and easy to remember. 6. Create a [employer] password. You can change your password at any time. 7. Enter your Password Reset Question and Answer.  This can be used at a later time if you forget your password. 8. Enter your e-mail address. You will receive e-mail notification when new information is available in [address] 9. Click Sign Up. You can now view and download portions of your medical record. 10. Click the Download Summary menu link to download a portable copy of your medical information. If you have questions, please visit the Frequently Asked Questions section of the [employer] website. Remember, [employer] is NOT to be used for urgent needs. For medical emergencies, dial 911. Now available from your iPhone and Android! Please provide this summary of care documentation to your next provider. Your primary care clinician is listed as Wilfred Zee If you have any questions after today's visit, please call [phone].

## 2018-02-01 NOTE — PROGRESS NOTES
Chief Complaint   Patient presents with    Other     Polyneuropathy     1. Have you been to the ER, urgent care clinic since your last visit? Hospitalized since your last visit? No    2. Have you seen or consulted any other health care providers outside of the [facility] since your last visit? Include any pap smears or colon screening.  Dr. Yanci Burgos

## 2018-02-20 ENCOUNTER — HOSPITAL ENCOUNTER (OUTPATIENT)
Dept: MRI IMAGING | Age: 73
Discharge: HOME | End: 2018-02-20
Attending: PREVENTIVE MEDICINE
Payer: MEDICARE

## 2018-02-20 DIAGNOSIS — M54.17 LUMBOSACRAL RADICULOPATHY: ICD-10-CM

## 2018-02-20 PROCEDURE — 72148 MRI LUMBAR SPINE W/O DYE: CPT

## 2018-03-01 DIAGNOSIS — G62.9 POLYNEUROPATHY: ICD-10-CM

## 2018-03-01 RX ORDER — PREDNISONE 5 MG/1
TABLET ORAL
Qty: 60 TAB | Refills: 0 | Status: SHIPPED | OUTPATIENT
Start: 2018-03-01 | End: 2019-01-25 | Stop reason: DRUGHIGH

## 2018-05-25 ENCOUNTER — HOSPITAL ENCOUNTER (OUTPATIENT)
Dept: MRI IMAGING | Age: 73
Discharge: HOME | End: 2018-05-25
Attending: PREVENTIVE MEDICINE
Payer: MEDICARE

## 2018-05-25 DIAGNOSIS — M75.101 TEAR OF RIGHT ROTATOR CUFF: ICD-10-CM

## 2018-05-25 PROCEDURE — 73221 MRI JOINT UPR EXTREM W/O DYE: CPT

## 2018-07-05 ENCOUNTER — APPOINTMENT (OUTPATIENT)
Dept: GENERAL RADIOLOGY | Age: 73
End: 2018-07-05
Attending: PHYSICIAN ASSISTANT
Payer: MEDICARE

## 2018-07-05 ENCOUNTER — HOSPITAL ENCOUNTER (EMERGENCY)
Age: 73
Discharge: HOME | End: 2018-07-05
Attending: EMERGENCY MEDICINE
Payer: MEDICARE

## 2018-07-05 VITALS
WEIGHT: 94.36 LBS | HEART RATE: 91 BPM | HEIGHT: 61 IN | RESPIRATION RATE: 14 BRPM | DIASTOLIC BLOOD PRESSURE: 77 MMHG | OXYGEN SATURATION: 97 % | TEMPERATURE: 98.7 F | SYSTOLIC BLOOD PRESSURE: 152 MMHG | BODY MASS INDEX: 17.81 KG/M2

## 2018-07-05 DIAGNOSIS — R22.32 NODULE OF FINGER OF LEFT HAND: Primary | ICD-10-CM

## 2018-07-05 PROCEDURE — 99282 EMERGENCY DEPT VISIT SF MDM: CPT

## 2018-07-05 PROCEDURE — 75810000218 HC FINE NEEDLE ASPIRATION

## 2018-07-05 PROCEDURE — 75810000139 HC PUNCT ASPIRATION ABCESS

## 2018-07-05 PROCEDURE — 73140 X-RAY EXAM OF FINGER(S): CPT

## 2018-07-05 RX ORDER — BUPIVACAINE HYDROCHLORIDE 5 MG/ML
0.5 INJECTION, SOLUTION EPIDURAL; INTRACAUDAL
Status: DISCONTINUED | OUTPATIENT
Start: 2018-07-05 | End: 2018-07-06 | Stop reason: HOSPADM

## 2018-07-06 NOTE — ED NOTES
ADIA Moore has reviewed discharge instructions with the patient. The patient verbalized understanding. Pt. A&Ox4, respirations even and unlabored. VS stable as noted in flowsheet. Declined wheelchair assist from department; paperwork in hand.

## 2018-07-06 NOTE — ED PROVIDER NOTES
EMERGENCY DEPARTMENT HISTORY AND PHYSICAL EXAM          Date: 7/5/2018  Patient Name: Babita Gomez    History of Presenting Illness     Chief Complaint   Patient presents with    Finger Pain     Pt ambulatory to triage with c/o L hand first finger pain x 8 months; pt states, \"I saw my neurologist about it awhile ago and he told me it was arthritis. Now it got a heartbeat\"; small cyst noted to L hand thumb, no open wounds       History Provided By: Patient    HPI: Babita Gomez is a 71 y.o. female, pmhx arthritis, who presents ambulatory to the ED c/o left thumb nodule/pain swelling x 2 months. Seen by neurologist, who says it's arthritis. She stuck it with a pin, but no drainage. It hurts to move her thumb. The pain does not radiate. Denies hx of DM. Takes oxycodone for pain, but it doesn't make it go away. She denies any recent injury. PCP: Niels Lauren MD        There are no other complaints, changes, or physical findings at this time. Current Outpatient Prescriptions   Medication Sig Dispense Refill    predniSONE (DELTASONE) 5 mg tablet TAKE 1 TABLET BY MOUTH TWICE A DAY 60 Tab 0    gabapentin (NEURONTIN) 300 mg capsule Take 1 Cap by mouth two (2) times a day. 60 Cap 2    oxyCODONE-acetaminophen (PERCOCET) 7.5-325 mg per tablet Take 1 Tab by mouth every four (4) hours as needed for Pain. Max Daily Amount: 6 Tabs. 30 Tab 0    enoxaparin (LOVENOX) 40 mg/0.4 mL INJECT 1 SYRINGE DAILY FOR 3 DAYS  0    amLODIPine (NORVASC) 5 mg tablet Take 5 mg by mouth daily.  albuterol (PROVENTIL VENTOLIN) 2.5 mg /3 mL (0.083 %) nebulizer solution 1.25 mg by Nebulization route once.  FLUoxetine (PROZAC) 40 mg capsule Take 40 mg by mouth daily.  QUEtiapine (SEROQUEL) 100 mg tablet 100 mg nightly.       baclofen (LIORESAL) 10 mg tablet TAKE 1 TABLET BY MOUTH AT BEDTIME  3    FLOVENT  mcg/actuation inhaler INHALE 2 PUFFS BY MOUTH TWICE A DAY  2    diclofenac EC (VOLTAREN) 75 mg EC tablet Take  by mouth two (2) times a day.  Aspirin, Buffered 81 mg tab Take  by mouth.  clopidogrel (PLAVIX) 75 mg tablet Take 75 mg by mouth daily. Past History     Past Medical History:  Past Medical History:   Diagnosis Date    Arthritis     Arthritis     Asthma     Depression     GERD (gastroesophageal reflux disease)     Heart murmur     benign    Hypertension     not on medication    Ill-defined condition     cerebral atherosclerosis    Ill-defined condition     pancreatitis    Ill-defined condition     polyneuropathy    Ill-defined condition     chronic opiod use    Lung disease     Seizures [facility] [address]     2016 last seizure    Stroke ([facility] [address]     tia    Thromboembolus ([facility] [address]     right leg       Past Surgical History:  Past Surgical History:   Procedure Laterality Date    BREAST SURGERY PROCEDURE UNLISTED      left breast lumpectomy    HX APPENDECTOMY  1972    HX HAMMER TOE REPAIR Right 7/302017    HX MOHS PROCEDURES Left 2009    HX ORTHOPAEDIC  2006    back    HX OTHER SURGICAL      Removal of blood clot left side of neck    HX OTHER SURGICAL      Neck    HX TUBAL LIGATION  1972    VASCULAR SURGERY PROCEDURE UNLIST      right carotid endarterectomy       Family History:  Family History   Problem Relation Age of Onset    Hypertension Mother     Lupus Sister     Cancer Brother      throat       Social History:  Social History   Substance Use Topics    Smoking status: Current Every Day Smoker     Packs/day: 0.50     Years: 30.00    Smokeless tobacco: Never Used    Alcohol use 4.2 oz/week     7 Cans of beer per week      Comment: one beer a day       Allergies: Allergies   Allergen Reactions    Penicillins Angioedema    Ampicillin Itching    Tramadol Itching         Review of Systems   Review of Systems   Constitutional: Negative for activity change, appetite change, fatigue and fever. HENT: Negative for congestion, dental problem, ear pain, rhinorrhea and sinus pressure. Eyes: Negative for photophobia, pain, discharge, redness, itching and visual disturbance. Respiratory: Negative for cough, chest tightness, shortness of breath, wheezing and stridor. Cardiovascular: Negative for chest pain and leg swelling. Gastrointestinal: Negative for abdominal distention, abdominal pain and blood in stool. Genitourinary: Negative for difficulty urinating, dysuria, flank pain, frequency, vaginal bleeding, vaginal discharge and vaginal pain. Musculoskeletal: Positive for arthralgias and joint swelling. Negative for back pain, gait problem, neck pain and neck stiffness. Skin: Negative for rash and wound. Neurological: Negative for dizziness, syncope, weakness, numbness and headaches. Psychiatric/Behavioral: Negative for behavioral problems. The patient is not nervous/anxious. Physical Exam   Physical Exam   Constitutional: She is oriented to person, place, and time. She appears well-developed and well-nourished. No distress. HENT:   Head: Normocephalic and atraumatic. Right Ear: External ear normal.   Left Ear: External ear normal.   Nose: Nose normal.   Eyes: Conjunctivae and EOM are normal. Pupils are equal, round, and reactive to light. Right eye exhibits no discharge. Left eye exhibits no discharge. No scleral icterus. Neck: Normal range of motion. Neck supple. Cardiovascular: Normal rate, regular rhythm, normal heart sounds and intact distal pulses. Exam reveals no gallop and no friction rub. No murmur heard. Pulmonary/Chest: Effort normal and breath sounds normal. No respiratory distress. She has no wheezes. She has no rales. She exhibits no tenderness. Abdominal: Soft. Bowel sounds are normal.   Musculoskeletal: She exhibits no edema. Left hand: She exhibits tenderness. TTP swelling and fluctuance of posterior aspect of left thumb, slight erythema at base of swelling. No red streaks. Decreased AROM of left thumb IP joint.  Cap refill is < 3 seconds. Neurological: She is alert and oriented to person, place, and time. No cranial nerve deficit. Skin: Skin is warm and dry. No rash noted. No erythema. Swelling to posterior aspect of left thumb IP joint   Psychiatric: She has a normal mood and affect. Her behavior is normal.   Nursing note and vitals reviewed. Diagnostic Study Results     Labs -   No results found for this or any previous visit (from the past 12 hour(s)). Radiologic Studies -   No orders to display     CT Results  (Last 48 hours)    None        CXR Results  (Last 48 hours)    None            Medical Decision Making   I am the first provider for this patient. I reviewed the vital signs, available nursing notes, past medical history, past surgical history, family history and social history. Vital Signs-Reviewed the patient's vital signs. Patient Vitals for the past 12 hrs:   Temp Pulse Resp BP SpO2   07/05/18 1911 98.7 °F (37.1 °C) 91 14 152/77 97 %       Records Reviewed: Nursing Notes and Old Medical Records    Provider Notes (Medical Decision Making):     DDx: nodule, cyst, arthritis, abscess,    ED Course:   Initial assessment performed. The patients presenting problems have been discussed, and they are in agreement with the care plan formulated and outlined with them. I have encouraged them to ask questions as they arise throughout their visit. PROGRESS NOTE:  10:00 PM  Reviewed x-ray results. Procedure Note - Incision and Drainage:   10:13 PM  Performed by: ADIA Abbott    Complexity: simple  Skin prepped with Hibiclens. Sterile field established. Anesthesia achieved with 0.5 mLs of Bupivacaine 0.5% without epinephrine using a local infiltration. Cyst to left thumb was aspirated with 23gauge needle, and 1mLs of clear gelatenus drainage was expressed. Sterile dressing applied. Estimated blood loss: none  The procedure took 1-15 minutes, and pt tolerated well.     Pt noted to be feeling better, ready for discharge. Updated pt and/or family on all final  imaging findings. Will follow up as instructed. All questions have been answered, pt voiced understanding and agreement with plan. Specific return precautions provided as well as instructions to return to the ED should sx worsen at any time. Vital signs stable for discharge. TOSHIA Lala    Disposition:    DISCHARGE NOTE:  10:15 PM  The patient's results have been reviewed with family and/or caregiver. They verbally convey their understanding and agreement of the patient's signs, symptoms, diagnosis, treatment, and prognosis. They additionally agree to follow up as recommended in the discharge instructions or to return to the Emergency Room should the patient's condition change prior to their follow-up appointment. The family and/or caregiver verbally agrees with the care-plan and all of their questions have been answered. The discharge instructions have also been provided to the them along with educational information regarding the patient's diagnosis and a list of reasons why the patient would want to return to the ER prior to their follow-up appointment should their condition change. TOSHIA Lala    PLAN:  1. Current Discharge Medication List      CONTINUE these medications which have NOT CHANGED    Details   predniSONE (DELTASONE) 5 mg tablet TAKE 1 TABLET BY MOUTH TWICE A DAY  Qty: 60 Tab, Refills: 0    Associated Diagnoses: Polyneuropathy      gabapentin (NEURONTIN) 300 mg capsule Take 1 Cap by mouth two (2) times a day. Qty: 60 Cap, Refills: 2      oxyCODONE-acetaminophen (PERCOCET) 7.5-325 mg per tablet Take 1 Tab by mouth every four (4) hours as needed for Pain. Max Daily Amount: 6 Tabs. Qty: 30 Tab, Refills: 0    Associated Diagnoses: Polyneuropathy; Left leg pain      enoxaparin (LOVENOX) 40 mg/0.4 mL INJECT 1 SYRINGE DAILY FOR 3 DAYS  Refills: 0      amLODIPine (NORVASC) 5 mg tablet Take 5 mg by mouth daily. albuterol (PROVENTIL VENTOLIN) 2.5 mg /3 mL (0.083 %) nebulizer solution 1.25 mg by Nebulization route once. FLUoxetine (PROZAC) 40 mg capsule Take 40 mg by mouth daily. QUEtiapine (SEROQUEL) 100 mg tablet 100 mg nightly. baclofen (LIORESAL) 10 mg tablet TAKE 1 TABLET BY MOUTH AT BEDTIME  Refills: 3      FLOVENT  mcg/actuation inhaler INHALE 2 PUFFS BY MOUTH TWICE A DAY  Refills: 2      diclofenac EC (VOLTAREN) 75 mg EC tablet Take  by mouth two (2) times a day. Aspirin, Buffered 81 mg tab Take  by mouth. clopidogrel (PLAVIX) 75 mg tablet Take 75 mg by mouth daily. 2.   Follow-up Information     Follow up With Details Comments [address] Vicente Plasencia III, MD   [address]      Abraham Mondragon MD  hand specialist [address] [address]  [address] [place] [address] EMERGENCY DEPT  If symptoms worsen [facility]  [address] [place] [address]  [phone]        Return to ED if worse     Diagnosis     Clinical Impression:   1. Nodule of finger of left hand              Not viewable in MyChart.

## 2019-01-25 ENCOUNTER — OFFICE VISIT (OUTPATIENT)
Dept: NEUROLOGY | Age: 74
End: 2019-01-25

## 2019-01-25 VITALS
WEIGHT: 96 LBS | HEIGHT: 61 IN | HEART RATE: 89 BPM | DIASTOLIC BLOOD PRESSURE: 93 MMHG | BODY MASS INDEX: 18.12 KG/M2 | TEMPERATURE: 97.8 F | SYSTOLIC BLOOD PRESSURE: 149 MMHG | RESPIRATION RATE: 18 BRPM | OXYGEN SATURATION: 94 %

## 2019-01-25 DIAGNOSIS — G62.9 POLYNEUROPATHY: Primary | ICD-10-CM

## 2019-01-25 DIAGNOSIS — R20.2 PARESTHESIA: ICD-10-CM

## 2019-01-25 DIAGNOSIS — M79.2 NEURITIS: ICD-10-CM

## 2019-01-25 DIAGNOSIS — G62.9 NEUROPATHY: ICD-10-CM

## 2019-01-25 RX ORDER — PREDNISONE 10 MG/1
10 TABLET ORAL 2 TIMES DAILY
Qty: 30 TAB | Refills: 0 | Status: SHIPPED | OUTPATIENT
Start: 2019-01-25 | End: 2019-02-05 | Stop reason: SDUPTHER

## 2019-01-25 NOTE — PROGRESS NOTES
Chief Complaint Patient presents with  Neuropathy 1. Have you been to the ER, urgent care clinic since your last visit? Hospitalized since your last visit? [address] [facility] 7/5/18 2. Have you seen or consulted any other health care providers outside of the [address] [place] [address] since your last visit? Include any pap smears or colon screening. [address] [place] [address]

## 2019-01-25 NOTE — PROGRESS NOTES
Neurology Progress Note NAME:  Hay Cruz :   1945 MRN:   [id number] Date/Time:  2019 Subjective:  
Hay Cruz is a 68 y.o. female here today for follow up for neuropathy, left leg pain, numbness and tingling sensation, status post rotator cuff repair right. Patient says she recently had surgery, right rotator cuff repair, she is recuperating from it and has been in a lot of pain. She noted that she has been having numbness and tingling sensation of the extremities, worse in the right upper extremity. I will obtain EMG/ nerve conduction studies all extremity to evaluate for neuropathy She reports pain in the back mostly the left side of the back, pain is sharp in nature and goes down the legs , pain is constant and made worse by activity. No fall was reported recently. It should be recalled that patient has severe cervical stenosis and spondylosis for which she is following spinal and orthopedic surgeon. She would benefit from a physical therapy for the neck and the shoulder status post rotator cuff repair. She noted that after the surgery she had DVT. I will start patient on prednisone 10 mg p.o. 2 times daily She denies loss of consciousness, dysphagia or odynophagia. Review of Systems - General ROS: positive for  - fatigue and sleep disturbance Psychological ROS: positive for - anxiety and sleep disturbances Ophthalmic ROS: positive for - blurry vision and dry eyes ENT ROS: positive for - headaches, tinnitus and vertigo Allergy and Immunology ROS: negative Hematological and Lymphatic ROS: negative Endocrine ROS: negative Respiratory ROS: no cough, shortness of breath, or wheezing Cardiovascular ROS: no chest pain or dyspnea on exertion Gastrointestinal ROS: no abdominal pain, change in bowel habits, or black or bloody stools Genito-Urinary ROS: no dysuria, trouble voiding, or hematuria Musculoskeletal ROS: positive for - gait disturbance, joint pain, joint stiffness, joint swelling, muscle pain and muscular weakness Neurological ROS: positive for - dizziness, gait disturbance, headaches, impaired coordination/balance, numbness/tingling and weakness Dermatological ROS: negative Medications reviewed: 
Current Outpatient Medications Medication Sig Dispense Refill  predniSONE (DELTASONE) 10 mg tablet Take 10 mg by mouth two (2) times a day. 30 Tab 0  
 gabapentin (NEURONTIN) 300 mg capsule Take 1 Cap by mouth two (2) times a day. 60 Cap 2  
 amLODIPine (NORVASC) 5 mg tablet Take 5 mg by mouth daily.  albuterol (PROVENTIL VENTOLIN) 2.5 mg /3 mL (0.083 %) nebulizer solution 1.25 mg by Nebulization route once.  FLUoxetine (PROZAC) 40 mg capsule Take 40 mg by mouth daily.  QUEtiapine (SEROQUEL) 100 mg tablet 100 mg nightly.  baclofen (LIORESAL) 10 mg tablet TAKE 1 TABLET BY MOUTH AT BEDTIME  3  
 FLOVENT  mcg/actuation inhaler INHALE 2 PUFFS BY MOUTH TWICE A DAY  2  
 diclofenac EC (VOLTAREN) 75 mg EC tablet Take  by mouth two (2) times a day.  Aspirin, Buffered 81 mg tab Take  by mouth.  clopidogrel (PLAVIX) 75 mg tablet Take 75 mg by mouth daily.  oxyCODONE-acetaminophen (PERCOCET) 7.5-325 mg per tablet Take 1 Tab by mouth every four (4) hours as needed for Pain. Max Daily Amount: 6 Tabs. 30 Tab 0  
 enoxaparin (LOVENOX) 40 mg/0.4 mL INJECT 1 SYRINGE DAILY FOR 3 DAYS  0 Objective:  
Vitals: 
Vitals:  
 01/25/19 0901 BP: (!) 149/93 Pulse: 89 Resp: 18 Temp: 97.8 °F (36.6 °C) TempSrc: Temporal  
SpO2: 94% Weight: 96 lb (43.5 kg) Height: 5' 1\" (1.549 m) PainSc:   0 - No pain Lab Data Reviewed: 
Lab Results Component Value Date/Time WBC 6.2 10/20/2017 04:19 AM  
 HCT 24.9 (L) 10/20/2017 04:19 AM  
 HGB 8.2 (L) 10/20/2017 04:19 AM  
 PLATELET 775 (L) 59/45/8493 04:19 AM  
 
 
Lab Results Component Value Date/Time  Sodium 138 10/20/2017 04:19 AM  
 Potassium 3.7 10/20/2017 04:19 AM  
 Chloride 105 10/20/2017 04:19 AM  
 CO2 30 10/20/2017 04:19 AM  
 Glucose 124 (H) 10/20/2017 04:19 AM  
 BUN 9 10/20/2017 04:19 AM  
 Creatinine 0.43 (L) 10/20/2017 04:19 AM  
 Calcium 7.8 (L) 10/20/2017 04:19 AM  
 
 
No components found for: [facility] No results found for: BRENDEN No results found for: HBA1C, HGBE8, CYI9DHWS, DHU0BZFA, IGC8XKZW No results found for: B12LT, FOL, RBCF No results found for: BRENDEN, Hector Gall No results found for: CHOL, CHOLPOCT, CHOLX, CHLST, CHOLV, HDL, HDLPOC, LDL, LDLCPOC, LDLC, DLDLP, VLDLC, VLDL, TGLX, TRIGL, TRIGP, TGLPOCT, CHHD, CHHDX 
 
 
CT Results (recent): 
Results from Abstract encounter on 04/19/17 CT ABD PELV W CONT  
 
 
MRI Results (recent): 
Results from Hospital Encounter encounter on 05/25/18 MRI SHOULDER RT WO CONT Narrative EXAM:  MRI SHOULDER RT WO CONT INDICATION: Right shoulder severe pain. COMPARISON: None TECHNIQUE: Axial proton density fat-saturated; oblique coronal T1, T2 
fat-saturated, and proton density fat-saturated; and oblique sagittal T2 
fat-saturated MRI of the right shoulder  performed on the 3 [place] magnet. CONTRAST: None. FINDINGS: A.C. joint: Mild osteoarthritis for age. Anterior acromion process 
type: 2 Bone marrow: Distal clavicle is mildly hypertrophied. Cystic degenerative marrow 
signal is in the greater tuberosity of the humerus. Superiorly subluxed humeral 
head abuts the undersurface of the acromion process. No acute fracture, 
dislocation, or marrow replacing process. Joint fluid: Moderate glenohumeral joint effusion extends into the 
subacromial/subdeltoid bursa, joint recesses, and long head of the biceps tendon 
sheath. Rotator cuff tendons: Complete tears of the supraspinatus and infraspinatus 
tendons. Retraction is medial to the glenoid. Teres minor tendon is intact. Subscapularis tendon is intact. Biceps tendon: Intact and located within the bicipital groove. Muscles: Mild edema in the infraspinatus more than the supraspinatus muscles. Moderate atrophy of the supraspinatus and infraspinatus muscles. Mild atrophy of 
the remaining muscles. Glenoid labrum: Intact. Glenohumeral joint capsule: within normal limits. Glenohumeral articular cartilage: Minimal osteoarthritis. No measurable 
osteochondral lesion. Soft tissue mass: None. Impression IMPRESSION:  
1. Massive rotator cuff tears and rotator cuff arthropathy. 2. Moderate atrophy of the mildly strained supraspinatus and infraspinatus 
muscles. IR Results (recent): VAS/US Results (recent): No results found for this or any previous visit. PHYSICAL EXAM: 
General:    Alert, cooperative, no distress, appears stated age. Head:   Normocephalic, without obvious abnormality, atraumatic. Eyes:   Conjunctivae/corneas clear. PERRLA Nose:  Nares normal. No drainage or sinus tenderness. Throat:    Lips, mucosa, and tongue normal.  No Thrush Neck:  Supple, symmetrical,  no adenopathy, thyroid: non tender 
  no carotid bruit and no JVD. Paraspinal tenderness Back:    Symmetric, diffuse tenderness. Lungs:   Clear to auscultation bilaterally. No Wheezing or Rhonchi. No rales. Chest wall:  No tenderness or deformity. No Accessory muscle use. Heart:   Regular rate and rhythm,  no murmur, rub or gallop. Abdomen:   Soft, non-tender. Not distended. Bowel sounds normal. No masses Extremities: Extremities normal, atraumatic, No cyanosis. No edema. No clubbing Skin:     Texture, turgor normal. No rashes or lesions. Not Jaundiced Lymph nodes: Cervical, supraclavicular normal. 
Psych:  Good insight. Not depressed. Anxious . NEUROLOGICAL EXAM: 
Appearance: The patient is well developed, well nourished, provides a coherent history and is in no acute distress. Mental Status: Oriented to time, place and person. Mood and affect appropriate. Cranial Nerves:   Intact visual fields. Fundi are benign. MARIA DOLORES, EOM's full, no nystagmus, no ptosis. Facial sensation is normal. Corneal reflexes are intact. Facial movement is symmetric. Hearing is normal bilaterally. Palate is midline with normal sternocleidomastoid and trapezius muscles are normal. Tongue is midline. Motor:  5-/5 strength in upper and lower proximal and distal muscles. Normal bulk and tone. No fasciculations. Reflexes:   Deep tendon reflexes 2+/4 and symmetrical.  
Sensory:    Decreased sensation to touch, pinprick and vibration lower extremity up to the knee, upper extremity up to the elbow. Gait:   Slightly unsteady gait. Tremor:   No tremor noted. Cerebellar:  No cerebellar signs present. Neurovascular:  Normal heart sounds and regular rhythm, peripheral pulses intact, and no carotid bruits. Assesment 1. Polyneuropathy EMG/ nerve conduction extremities 2. Neuropathy EMG /nerve conduction studies 3. Neuritis Continue management 4. Paresthesia Stable 
 
___________________________________________________ PLAN: Medication and plan reviewed with patient ICD-10-CM ICD-9-CM 1. Polyneuropathy G62.9 356.9 EMG NCV MOTOR WITH F/WAVE PER NERVE 2. Neuropathy G62.9 355.9 EMG NCV MOTOR WITH F/WAVE PER NERVE 3. Neuritis M79.2 729.2 EMG NCV MOTOR WITH F/WAVE PER NERVE 4. Paresthesia R20.2 782.0 EMG NCV MOTOR WITH F/WAVE PER NERVE Follow-up Disposition: 
Return in about 4 weeks (around 2/22/2019). 
 
 
__________________________________________________ Total time spent with patient:  []15   []25   []35   [] __ minutes Care Plan discussed with: 
  []Patient   []Family    []Care Manager   []Consultant/Specialist : 
 
___________________________________________________ Attending Physician: Michelle Campos MD

## 2019-02-05 RX ORDER — PREDNISONE 10 MG/1
TABLET ORAL
Qty: 30 TAB | Refills: 0 | Status: SHIPPED | OUTPATIENT
Start: 2019-02-05 | End: 2019-02-24 | Stop reason: SDUPTHER

## 2019-02-06 ENCOUNTER — OFFICE VISIT (OUTPATIENT)
Dept: NEUROLOGY | Age: 74
End: 2019-02-06

## 2019-02-06 DIAGNOSIS — G62.9 NEUROPATHY: ICD-10-CM

## 2019-02-06 DIAGNOSIS — G56.03 BILATERAL CARPAL TUNNEL SYNDROME: Primary | ICD-10-CM

## 2019-02-06 DIAGNOSIS — M79.642 PAIN IN BOTH HANDS: ICD-10-CM

## 2019-02-06 DIAGNOSIS — M79.641 PAIN IN BOTH HANDS: ICD-10-CM

## 2019-02-06 NOTE — PROGRESS NOTES
[address], [address], [address]  p: [phone]  f: [phone]    Test Date:  2019    Patient: Callum Gold  : 1945 Physician: Baron Pina   Sex: Female Height: 5' 1\" Ref Phys:    ID#: [id number] Weight: 96 lbs. Technician: Saige Montalvo     Patient Complaints:  b/l ue pain numbness and tingling sensation    Medications: See chart      Patient History / Exam: This is a 45-year-old right-handed black female who is being evaluated for hand pain, numbness and tingling sensation, dropping things. NCV & EMG Findings:  Evaluation of the left median sensory and the right median sensory nerves showed prolonged distal peak latency (L3.8, R3.9 ms) and decreased conduction velocity (Wrist-2nd Digit, L37, R36 m/s). All remaining nerves (as indicated in the following tables) were within normal limits. All left vs. right side differences were within normal limits. All F Wave latencies were within normal limits. All F Wave left vs. right side latency differences were within normal limits. All examined muscles (as indicated in the following table) showed no evidence of electrical instability. Impression: Abnormal study. There is electrodiagnostic evidence of bilateral sensorimotor median nerve neuropathy consistent with bilateral carpal tunnel syndrome.       Recommendations: Carpal tunnel release suggested.      ___________________________          Nerve Conduction Studies  Anti Sensory Summary Table     Stim Site NR Peak (ms) Norm Peak (ms) P-T Amp (µV) Norm P-T Amp Site1 Site2 Delta-P (ms) Dist (cm) Blair (m/s) Norm Blair (m/s)   Left Median Anti Sensory (2nd Digit)  33.7°C   Wrist    3.8 <3.6 35.4 >10 Wrist 2nd Digit 3.8 14.0 37 >39   Right Median Anti Sensory (2nd Digit)  33.4°C   Wrist    3.9 <3.6 26.7 >10 Wrist 2nd Digit 3.9 14.0 36 >39   Left Ulnar Anti Sensory (5th Digit)  33.5°C   Wrist    3.4 <3.7 35.6 >15.0 Wrist 5th Digit 3.4 14.0 41 >38   Right Ulnar Anti Sensory (5th Digit)  33.4°C   Wrist    3.4 <3.7 24.9 >15.0 Wrist 5th Digit 3.4 14.0 41 >38     Motor Summary Table     Stim Site NR Onset (ms) Norm Onset (ms) O-P Amp (mV) Norm O-P Amp Site1 Site2 Delta-0 (ms) Dist (cm) Blair (m/s) Norm Blair (m/s)   Left Median Motor (Abd Poll Brev)  32.2°C   Wrist    3.4 <4.2 8.3 >5 Elbow Wrist 3.6 24.0 67 >50   Elbow    7.0  7.9          Right Median Motor (Abd Poll Brev)  33.1°C   Wrist    3.3 <4.2 6.7 >5 Elbow Wrist 3.9 24.0 62 >50   Elbow    7.2  6.2          Left Ulnar Motor (Abd Dig Min)  32.8°C   Wrist    2.9 <4.2 6.3 >3 B Elbow Wrist 3.5 24.0 69 >53   B Elbow    6.4  5.6  A Elbow B Elbow 1.4 10.0 71 >53   A Elbow    7.8  5.4          Right Ulnar Motor (Abd Dig Min)  33.3°C   Wrist    2.8 <4.2 8.3 >3 B Elbow Wrist 3.7 23.0 62 >53   B Elbow    6.5  7.5  A Elbow B Elbow 1.3 10.0 77 >53   A Elbow    7.8  6.8            F Wave Studies     NR F-Lat (ms) Lat Norm (ms) L-R F-Lat (ms) L-R Lat Norm   Left Ulnar (Abd Dig Min)  33.1°C      27.97 <36 0.55 <2.5   Right Ulnar (Abd Dig Min)  33.4°C      28.52 <36 0.55 <2.5     EMG+     Side Muscle Nerve Root Ins Act Fibs Psw Amp Dur Poly Recrt Int Pat Comment   Right Deltoid Axillary C5-6 Nml Nml Nml Nml Nml 0 Nml Nml    Right Biceps Musculocut C5-6 Nml Nml Nml Nml Nml 0 Nml Nml    Right Triceps Radial C6-7-8 Nml Nml Nml Nml Nml 0 Nml Nml    Right BrachioRad Radial C5-6 Nml Nml Nml Nml Nml 0 Nml Nml    Right Abd Poll Brev Median C8-T1 Nml Nml Nml Nml Nml 0 Nml Nml        Nerve Conduction Studies  Anti Sensory Left/Right Comparison     Stim Site L Lat (ms) R Lat (ms) L-R Lat (ms) L Amp (µV) R Amp (µV) L-R Amp (%) Site1 Site2 L Blair (m/s) R Blair (m/s) L-R Blair (m/s)   Median Anti Sensory (2nd Digit)  33.7°C   Wrist 3.8 3.9 0.1 35.4 26.7 24.6 Wrist 2nd Digit 37 36 1   Ulnar Anti Sensory (5th Digit)  33.5°C   Wrist 3.4 3.4 0.0 35.6 24.9 30.1 Wrist 5th Digit 41 41 0     Motor Left/Right Comparison     Stim Site L Lat (ms) R Lat (ms) L-R Lat (ms) L Amp (mV) R Amp (mV) L-R Amp (%) Site1 Site2 L Blair (m/s) R Blair (m/s) L-R Blair (m/s)   Median Motor (Abd Poll Brev)  32.2°C   Wrist 3.4 3.3 0.1 8.3 6.7 19.3 Elbow Wrist 67 62 5   Elbow 7.0 7.2 0.2 7.9 6.2 21.5        Ulnar Motor (Abd Dig Min)  32.8°C   Wrist 2.9 2.8 0.1 6.3 8.3 24.1 B Elbow Wrist 69 62 7   B Elbow 6.4 6.5 0.1 5.6 7.5 25.3 A Elbow B Elbow 71 77 6   A Elbow 7.8 7.8 0.0 5.4 6.8 20.6              Waveforms:

## 2019-02-24 RX ORDER — PREDNISONE 10 MG/1
TABLET ORAL
Qty: 30 TAB | Refills: 0 | Status: SHIPPED | OUTPATIENT
Start: 2019-02-24 | End: 2019-07-05 | Stop reason: DRUGHIGH

## 2019-02-25 ENCOUNTER — OFFICE VISIT (OUTPATIENT)
Dept: NEUROLOGY | Age: 74
End: 2019-02-25

## 2019-02-25 VITALS
BODY MASS INDEX: 18.65 KG/M2 | SYSTOLIC BLOOD PRESSURE: 160 MMHG | OXYGEN SATURATION: 98 % | HEIGHT: 61 IN | DIASTOLIC BLOOD PRESSURE: 70 MMHG | TEMPERATURE: 98.1 F | WEIGHT: 98.8 LBS | RESPIRATION RATE: 18 BRPM | HEART RATE: 82 BPM

## 2019-02-25 DIAGNOSIS — R29.6 FREQUENT FALLS: ICD-10-CM

## 2019-02-25 DIAGNOSIS — G62.9 POLYNEUROPATHY: ICD-10-CM

## 2019-02-25 DIAGNOSIS — G56.03 BILATERAL CARPAL TUNNEL SYNDROME: Primary | ICD-10-CM

## 2019-02-25 DIAGNOSIS — M47.819 VERTEBRAL ARTHROPATHY: ICD-10-CM

## 2019-02-25 DIAGNOSIS — G89.29 CHRONIC BILATERAL LOW BACK PAIN WITHOUT SCIATICA: ICD-10-CM

## 2019-02-25 DIAGNOSIS — R20.2 PARESTHESIA: ICD-10-CM

## 2019-02-25 DIAGNOSIS — M54.50 CHRONIC BILATERAL LOW BACK PAIN WITHOUT SCIATICA: ICD-10-CM

## 2019-02-25 DIAGNOSIS — M79.605 LEFT LEG PAIN: ICD-10-CM

## 2019-02-25 DIAGNOSIS — R42 DIZZINESS: ICD-10-CM

## 2019-02-25 RX ORDER — OXYCODONE AND ACETAMINOPHEN 7.5; 325 MG/1; MG/1
1 TABLET ORAL
Qty: 20 TAB | Refills: 0 | Status: SHIPPED | OUTPATIENT
Start: 2019-02-25 | End: 2019-07-05 | Stop reason: SDUPTHER

## 2019-02-25 NOTE — PROGRESS NOTES
Chief Complaint Patient presents with  Neuropathy  Results EMG 1. Have you been to the ER, urgent care clinic since your last visit? Hospitalized since your last visit? no 
 
2. Have you seen or consulted any other health care providers outside of the [address] [place] [address] since your last visit? Include any pap smears or colon screening. no\
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
## 2019-03-04 ENCOUNTER — HOSPITAL ENCOUNTER (OUTPATIENT)
Dept: MRI IMAGING | Age: 74
Discharge: HOME | End: 2019-03-04
Attending: PSYCHIATRY & NEUROLOGY
Payer: MEDICARE

## 2019-03-04 DIAGNOSIS — R42 DIZZINESS: ICD-10-CM

## 2019-03-04 DIAGNOSIS — R29.6 FREQUENT FALLS: ICD-10-CM

## 2019-03-04 DIAGNOSIS — R20.2 PARESTHESIA: ICD-10-CM

## 2019-03-04 PROCEDURE — 74011250636 HC RX REV CODE- 250/636: Performed by: PSYCHIATRY & NEUROLOGY

## 2019-03-04 PROCEDURE — A9575 INJ GADOTERATE MEGLUMI 0.1ML: HCPCS | Performed by: PSYCHIATRY & NEUROLOGY

## 2019-03-04 PROCEDURE — 70553 MRI BRAIN STEM W/O & W/DYE: CPT

## 2019-03-04 RX ORDER — GADOTERATE MEGLUMINE 376.9 MG/ML
10 INJECTION INTRAVENOUS
Status: COMPLETED | OUTPATIENT
Start: 2019-03-04 | End: 2019-03-04

## 2019-03-04 RX ADMIN — GADOTERATE MEGLUMINE 10 ML: 376.9 INJECTION INTRAVENOUS at 14:34

## 2019-07-05 ENCOUNTER — OFFICE VISIT (OUTPATIENT)
Dept: NEUROLOGY | Age: 74
End: 2019-07-05

## 2019-07-05 VITALS
HEIGHT: 61 IN | SYSTOLIC BLOOD PRESSURE: 132 MMHG | BODY MASS INDEX: 18.84 KG/M2 | WEIGHT: 99.8 LBS | HEART RATE: 89 BPM | DIASTOLIC BLOOD PRESSURE: 70 MMHG | OXYGEN SATURATION: 94 %

## 2019-07-05 DIAGNOSIS — M47.819 VERTEBRAL ARTHROPATHY: ICD-10-CM

## 2019-07-05 DIAGNOSIS — G62.9 POLYNEUROPATHY: ICD-10-CM

## 2019-07-05 DIAGNOSIS — M79.605 LEFT LEG PAIN: ICD-10-CM

## 2019-07-05 DIAGNOSIS — G89.29 CHRONIC BILATERAL LOW BACK PAIN WITHOUT SCIATICA: ICD-10-CM

## 2019-07-05 DIAGNOSIS — M54.50 CHRONIC BILATERAL LOW BACK PAIN WITHOUT SCIATICA: ICD-10-CM
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
## 2019-07-05 DIAGNOSIS — R52 PAIN: ICD-10-CM

## 2019-07-05 RX ORDER — OXYCODONE AND ACETAMINOPHEN 7.5; 325 MG/1; MG/1
1 TABLET ORAL
Qty: 15 TAB | Refills: 0 | Status: SHIPPED | OUTPATIENT
Start: 2019-07-05 | End: 2019-07-10

## 2019-07-05 RX ORDER — PREDNISONE 20 MG/1
20 TABLET ORAL 2 TIMES DAILY
Qty: 20 TAB | Refills: 0 | Status: SHIPPED | OUTPATIENT
Start: 2019-07-05 | End: 2021-01-13

## 2019-07-05 RX ORDER — GABAPENTIN 600 MG/1
600 TABLET ORAL 3 TIMES DAILY
Qty: 90 TAB | Refills: 2 | Status: SHIPPED | OUTPATIENT
Start: 2019-07-05 | End: 2020-02-03 | Stop reason: SDUPTHER
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
## 2019-10-04 ENCOUNTER — OFFICE VISIT (OUTPATIENT)
Dept: NEUROLOGY | Age: 74
End: 2019-10-04

## 2019-10-04 VITALS
WEIGHT: 106.8 LBS | HEART RATE: 85 BPM | TEMPERATURE: 98.2 F | RESPIRATION RATE: 16 BRPM | DIASTOLIC BLOOD PRESSURE: 72 MMHG | OXYGEN SATURATION: 96 % | HEIGHT: 61 IN | BODY MASS INDEX: 20.16 KG/M2 | SYSTOLIC BLOOD PRESSURE: 158 MMHG

## 2019-10-04 DIAGNOSIS — M54.42 CHRONIC BILATERAL LOW BACK PAIN WITH BILATERAL SCIATICA: ICD-10-CM

## 2019-10-04 DIAGNOSIS — Z79.891 USE OF OPIATES FOR THERAPEUTIC PURPOSES: ICD-10-CM

## 2019-10-04 DIAGNOSIS — F41.1 GAD (GENERALIZED ANXIETY DISORDER): ICD-10-CM

## 2019-10-04 DIAGNOSIS — M54.41 CHRONIC BILATERAL LOW BACK PAIN WITH BILATERAL SCIATICA: ICD-10-CM

## 2019-10-04 DIAGNOSIS — R26.9 GAIT DISORDER: ICD-10-CM

## 2019-10-04 DIAGNOSIS — G62.9 POLYNEUROPATHY: Primary | ICD-10-CM

## 2019-10-04 DIAGNOSIS — G89.29 CHRONIC BILATERAL LOW BACK PAIN WITH BILATERAL SCIATICA: ICD-10-CM

## 2019-10-04 DIAGNOSIS — M47.819 VERTEBRAL ARTHROPATHY: ICD-10-CM

## 2019-10-04 RX ORDER — OXYCODONE AND ACETAMINOPHEN 7.5; 325 MG/1; MG/1
TABLET ORAL
COMMUNITY
End: 2021-01-20

## 2019-10-04 RX ORDER — HYDROCODONE BITARTRATE AND ACETAMINOPHEN 5; 325 MG/1; MG/1
TABLET ORAL
COMMUNITY
End: 2021-01-13

## 2019-10-04 RX ORDER — DIAZEPAM 5 MG/1
5 TABLET ORAL 2 TIMES DAILY
Qty: 60 TAB | Refills: 1 | Status: SHIPPED | OUTPATIENT
Start: 2019-10-04 | End: 2020-05-04

## 2019-10-04 NOTE — PROGRESS NOTES
Chief Complaint   Patient presents with    Neurologic Problem     Polyneuropathy     1. Have you been to the ER, urgent care clinic since your last visit? Hospitalized since your last visit? [facility] ED    2. Have you seen or consulted any other health care providers outside of the [address] [place] [address] since your last visit? Include any pap smears or colon screening.  [facility]
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
## 2019-10-11 LAB — DRUGS UR: NORMAL

## 2019-11-26 NOTE — TELEPHONE ENCOUNTER
Patient needs a call back, Says she is in pain in her back, and she ran out her medicine. Patient was asking to make an appointment but I have no appointments available. I have personally evaluated and examined the patient. The Attending was available to me as a supervising provider if needed.

## 2020-01-08 ENCOUNTER — OFFICE VISIT (OUTPATIENT)
Dept: NEUROLOGY | Age: 75
End: 2020-01-08

## 2020-01-08 VITALS
SYSTOLIC BLOOD PRESSURE: 142 MMHG | HEART RATE: 89 BPM | BODY MASS INDEX: 20.09 KG/M2 | DIASTOLIC BLOOD PRESSURE: 67 MMHG | WEIGHT: 106.4 LBS | HEIGHT: 61 IN | OXYGEN SATURATION: 93 % | RESPIRATION RATE: 18 BRPM | TEMPERATURE: 98.2 F

## 2020-01-08 DIAGNOSIS — M54.42 CHRONIC BILATERAL LOW BACK PAIN WITH BILATERAL SCIATICA: ICD-10-CM

## 2020-01-08 DIAGNOSIS — M54.41 CHRONIC BILATERAL LOW BACK PAIN WITH BILATERAL SCIATICA: ICD-10-CM

## 2020-01-08 DIAGNOSIS — G62.9 POLYNEUROPATHY: Primary | ICD-10-CM

## 2020-01-08 DIAGNOSIS — R26.9 GAIT DISORDER: ICD-10-CM

## 2020-01-08 DIAGNOSIS — M47.819 VERTEBRAL ARTHROPATHY: ICD-10-CM

## 2020-01-08 DIAGNOSIS — R29.6 FREQUENT FALLS: ICD-10-CM

## 2020-01-08 DIAGNOSIS — G89.29 CHRONIC BILATERAL LOW BACK PAIN WITH BILATERAL SCIATICA: ICD-10-CM
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
## 2020-02-03 DIAGNOSIS — M54.50 CHRONIC BILATERAL LOW BACK PAIN WITHOUT SCIATICA: ICD-10-CM

## 2020-02-03 DIAGNOSIS — M47.819 VERTEBRAL ARTHROPATHY: ICD-10-CM

## 2020-02-03 DIAGNOSIS — G89.29 CHRONIC BILATERAL LOW BACK PAIN WITHOUT SCIATICA: ICD-10-CM

## 2020-02-03 DIAGNOSIS — G62.9 POLYNEUROPATHY: ICD-10-CM

## 2020-02-03 RX ORDER — GABAPENTIN 600 MG/1
600 TABLET ORAL 3 TIMES DAILY
Qty: 90 TAB | Refills: 2 | Status: SHIPPED | OUTPATIENT
Start: 2020-02-03

## 2020-04-08 ENCOUNTER — VIRTUAL VISIT (OUTPATIENT)
Dept: CARDIOLOGY CLINIC | Age: 75
End: 2020-04-08

## 2020-04-08 DIAGNOSIS — I65.29 STENOSIS OF CAROTID ARTERY, UNSPECIFIED LATERALITY: ICD-10-CM

## 2020-04-08 DIAGNOSIS — R01.1 HEART MURMUR: ICD-10-CM

## 2020-04-08 DIAGNOSIS — I73.9 PVD (PERIPHERAL VASCULAR DISEASE) (HCC): ICD-10-CM

## 2020-04-08 DIAGNOSIS — Z01.810 PREOP CARDIOVASCULAR EXAM: Primary | ICD-10-CM

## 2020-04-08 RX ORDER — POTASSIUM CHLORIDE 1500 MG/1
1 TABLET, FILM COATED, EXTENDED RELEASE ORAL DAILY
COMMUNITY
Start: 2019-12-16

## 2020-04-08 RX ORDER — FLUTICASONE PROPIONATE AND SALMETEROL 500; 50 UG/1; UG/1
1 POWDER RESPIRATORY (INHALATION) 2 TIMES DAILY
COMMUNITY
Start: 2019-12-16

## 2020-04-08 NOTE — PROGRESS NOTES
Cardiology Telemedicine Encounter    Ravin Georges is a 69 y.o. female who was seen by synchronous (real-time) audio-video technology on 4/8/2020         Subjective/HPI:     Ravin Georges is a 69 y.o. female is here for new office consultation via virtual visit. She has a PMHx of HTN, PVD, CVA and neuropathy. Had right carotid endarterectomy and right femoral-popliteal bypass surgery in the past. She is undergoing back surgery and here for pre op evaluation. On lovenox for ? DVT. She denies any chest pain, SOB, edema, palpitations. Limited physical activity due to her back. No previous cardiac issues. Has h/o heart murmur. Smokes 1 ppd. No family h/o CAD. Back surgery is on hold due to COVD 19 and pre op evaluation. PCP Provider  Kandis Sánchez MD    Past Medical History:   Diagnosis Date    Arthritis     Arthritis     Asthma     Depression     GERD (gastroesophageal reflux disease)     Heart murmur     benign    Hypertension     not on medication    Ill-defined condition     cerebral atherosclerosis    Ill-defined condition     pancreatitis    Ill-defined condition     polyneuropathy    Ill-defined condition     chronic opiod use    Lung disease     Seizures ([address]     2016 last seizure    Stroke [facility]     tia    Thromboembolus ([address]     right leg        Allergies   Allergen Reactions    Penicillins Angioedema    Ampicillin Itching    Diazepam Vertigo    Tramadol Itching        Outpatient Encounter Medications as of 4/8/2020   Medication Sig Dispense Refill    fluticasone propion-salmeteroL (Advair Diskus) 500-50 mcg/dose diskus inhaler Take 1 Puff by inhalation two (2) times a day.  potassium chloride SR (K-TAB) 20 mEq tablet Take 1 Tab by mouth daily.  gabapentin (NEURONTIN) 600 mg tablet Take 1 Tab by mouth three (3) times daily. Max Daily Amount: 1,800 mg.  For Trigeminal Neuralgia 90 Tab 2    oxyCODONE-acetaminophen (PERCOCET 7.5) 7.5-325 mg per tablet oxycodone-acetaminophen 7.5 mg-325 mg tablet      diazePAM (VALIUM) 5 mg tablet Take 1 Tab by mouth two (2) times a day. Max Daily Amount: 10 mg. 60 Tab 1    predniSONE (DELTASONE) 20 mg tablet Take 20 mg by mouth two (2) times a day. 20 Tab 0    amLODIPine (NORVASC) 5 mg tablet Take 5 mg by mouth daily.  albuterol (PROVENTIL VENTOLIN) 2.5 mg /3 mL (0.083 %) nebulizer solution 1.25 mg by Nebulization route once.  FLUoxetine (PROZAC) 40 mg capsule Take 40 mg by mouth daily.  QUEtiapine (SEROQUEL) 100 mg tablet 100 mg nightly.  baclofen (LIORESAL) 10 mg tablet TAKE 1 TABLET BY MOUTH AT BEDTIME  3    FLOVENT  mcg/actuation inhaler INHALE 2 PUFFS BY MOUTH TWICE A DAY  2    diclofenac EC (VOLTAREN) 75 mg EC tablet Take  by mouth two (2) times a day.  Aspirin, Buffered 81 mg tab Take  by mouth.  clopidogrel (PLAVIX) 75 mg tablet Take 75 mg by mouth daily.  [DISCONTINUED] AMLODIPINE BESYLATE, BULK, Take 5 mg by mouth daily.  HYDROcodone-acetaminophen (NORCO) 5-325 mg per tablet Take  by mouth every four to six (4-6) hours as needed for Pain.  enoxaparin (LOVENOX) 40 mg/0.4 mL INJECT 1 SYRINGE DAILY FOR 3 DAYS  0     No facility-administered encounter medications on file as of 4/8/2020. Review of Symptoms:    Review of Systems   Constitutional: Negative. Negative for chills and fever. HENT: Negative. Negative for hearing loss. Respiratory: Negative. Negative for cough, hemoptysis and shortness of breath. Cardiovascular: Negative. Negative for chest pain, palpitations, orthopnea, claudication, leg swelling and PND. Gastrointestinal: Negative. Negative for nausea and vomiting. Musculoskeletal: Negative for myalgias. Skin: Negative. Negative for rash. Neurological: Negative. Negative for dizziness, loss of consciousness and headaches.        Physical Exam:      General: Well developed, in no acute distress, cooperative and alert  HEENT: trach is midline. PEERL, EOM intact. Respiratory: No audible wheezing, no acute respiratory distress, normal effort of breathing  Extremities:  No cyanosis, atraumatic. No lower extremity edema. Neuro: A&Ox3, speech clear  Skin: Skin color is normal. No rashes or lesions. Non diaphoretic  Due to this being a TeleHealth evaluation, many elements of the physical examination are unable to be assessed. Cardiology Labs:    No results found for: FLP, CHOL, HDL, LDLC, VLDL, CHHD    No results found for: HBA1C, HFK6RKXH, XQX6IPAD    Lab Results   Component Value Date/Time    Sodium 138 10/20/2017 04:19 AM    Potassium 3.7 10/20/2017 04:19 AM    Chloride 105 10/20/2017 04:19 AM    CO2 30 10/20/2017 04:19 AM    Glucose 124 (H) 10/20/2017 04:19 AM    BUN 9 10/20/2017 04:19 AM    Creatinine 0.43 (L) 10/20/2017 04:19 AM    BUN/Creatinine ratio 21 (H) 10/20/2017 04:19 AM    GFR est AA >60 10/20/2017 04:19 AM    GFR est non-AA >60 10/20/2017 04:19 AM    Calcium 7.8 (L) 10/20/2017 04:19 AM    Anion gap 3 (L) 10/20/2017 04:19 AM    Bilirubin, total 0.5 10/12/2017 11:09 AM    ALT (SGPT) 20 10/12/2017 11:09 AM    AST (SGOT) 22 10/12/2017 11:09 AM    Alk. phosphatase 107 10/12/2017 11:09 AM    Protein, total 7.5 10/12/2017 11:09 AM    Albumin 4.0 10/12/2017 11:09 AM    Globulin 3.5 10/12/2017 11:09 AM    A-G Ratio 1.1 10/12/2017 11:09 AM          Assessment:       ICD-10-CM ICD-9-CM    1. Preop cardiovascular exam Z01.810 V72.81    2. PVD (peripheral vascular disease) (HCC) I73.9 443.9    3. Stenosis of carotid artery, unspecified laterality I65.29 433.10    4. Heart murmur R01.1 785. 2         Plan:     1. Preop cardiovascular exam  She is moderate to high risk given her multiple co-morbid conditions. Will evaluate her cardiac risk with pharmacologic stress test.    2. PVD (peripheral vascular disease) ([address]  Previous surgery, asymptomatic. Advised to quit smoking.     3. Stenosis of carotid artery, unspecified laterality  Had surgery on the right. Followed by vascular surgery. 4. Heart murmur  Will check echo. Surgical clearance based on echo/stress test results. Juan Britt MD        This visit was completed using [employer] Virtual Visit telemedicine services    Pursuant to the emergency declaration under the [facility], [facility] waiver authority and the Coronavirus Preparedness and [employer] Act, this Virtual Visit was conducted, with patient's consent, to reduce the patient's risk of exposure to COVID-19 and provide continuity of care for an established patient. Services were provided through a video synchronous discussion virtually to substitute for in-person clinic visit.

## 2020-04-20 ENCOUNTER — VIRTUAL VISIT (OUTPATIENT)
Dept: CARDIOLOGY CLINIC | Age: 75
End: 2020-04-20

## 2020-04-20 ENCOUNTER — TELEPHONE (OUTPATIENT)
Dept: CARDIOLOGY CLINIC | Age: 75
End: 2020-04-20

## 2020-04-20 VITALS
DIASTOLIC BLOOD PRESSURE: 74 MMHG | SYSTOLIC BLOOD PRESSURE: 132 MMHG | HEART RATE: 97 BPM | HEIGHT: 60 IN | WEIGHT: 110 LBS | BODY MASS INDEX: 21.6 KG/M2

## 2020-04-20 DIAGNOSIS — I44.1 SECOND DEGREE AV BLOCK: ICD-10-CM

## 2020-04-20 DIAGNOSIS — Z01.810 PREOP CARDIOVASCULAR EXAM: Primary | ICD-10-CM

## 2020-04-20 NOTE — PROGRESS NOTES
Chief Complaint   Patient presents with    Results     follow up for echo and NST results-    Shortness of Breath     due to asthma and horseness     Documented VS per patient taking at home

## 2020-04-20 NOTE — TELEPHONE ENCOUNTER
Patrick Diaz,  Dr Severa Blue has order a monitor    [address] CPT(R)]    Thanks  Charan Small

## 2020-04-20 NOTE — PROGRESS NOTES
Cardiology Telemedicine Encounter    Riccardo Lopez is a 69 y.o. female who was seen by synchronous (real-time) audio-video technology on 4/20/2020           Subjective/HPI:     Riccardo Lopez is a 69 y.o. female is here for routine follow-up via virtual visit. She is here to discuss her test results. Her stress test, echo were normal. She was noted to have second degree heart block prior to her stress test. Patient is not aware of any previous issues. She denies any dizzy spells, syncope. PCP Provider  Emmy Ortiz MD    Past Medical History:   Diagnosis Date    Arthritis     Arthritis     Asthma     Depression     GERD (gastroesophageal reflux disease)     Heart murmur     benign    Hypertension     not on medication    Ill-defined condition     cerebral atherosclerosis    Ill-defined condition     pancreatitis    Ill-defined condition     polyneuropathy    Ill-defined condition     chronic opiod use    Lung disease     Seizures ([address]     2016 last seizure    Stroke ([facility] [address]     tia    Thromboembolus ([address]     right leg        Allergies   Allergen Reactions    Penicillins Angioedema    Ampicillin Itching    Diazepam Vertigo    Tramadol Itching        Outpatient Encounter Medications as of 4/20/2020   Medication Sig Dispense Refill    fluticasone propion-salmeteroL (Advair Diskus) 500-50 mcg/dose diskus inhaler Take 1 Puff by inhalation two (2) times a day.  potassium chloride SR (K-TAB) 20 mEq tablet Take 1 Tab by mouth daily.  gabapentin (NEURONTIN) 600 mg tablet Take 1 Tab by mouth three (3) times daily. Max Daily Amount: 1,800 mg. For Trigeminal Neuralgia 90 Tab 2    predniSONE (DELTASONE) 20 mg tablet Take 20 mg by mouth two (2) times a day. 20 Tab 0    enoxaparin (LOVENOX) 40 mg/0.4 mL INJECT 1 SYRINGE DAILY FOR 3 DAYS  0    amLODIPine (NORVASC) 5 mg tablet Take 5 mg by mouth daily.       albuterol (PROVENTIL VENTOLIN) 2.5 mg /3 mL (0.083 %) nebulizer solution 1.25 mg by Nebulization route once.  FLUoxetine (PROZAC) 40 mg capsule Take 40 mg by mouth daily.  QUEtiapine (SEROQUEL) 100 mg tablet 100 mg nightly.  baclofen (LIORESAL) 10 mg tablet TAKE 1 TABLET BY MOUTH AT BEDTIME  3    FLOVENT  mcg/actuation inhaler INHALE 2 PUFFS BY MOUTH TWICE A DAY  2    diclofenac EC (VOLTAREN) 75 mg EC tablet Take  by mouth two (2) times a day.  Aspirin, Buffered 81 mg tab Take  by mouth.  clopidogrel (PLAVIX) 75 mg tablet Take 75 mg by mouth daily.  oxyCODONE-acetaminophen (PERCOCET 7.5) 7.5-325 mg per tablet oxycodone-acetaminophen 7.5 mg-325 mg tablet      HYDROcodone-acetaminophen (NORCO) 5-325 mg per tablet Take  by mouth every four to six (4-6) hours as needed for Pain.  diazePAM (VALIUM) 5 mg tablet Take 1 Tab by mouth two (2) times a day. Max Daily Amount: 10 mg. 60 Tab 1     No facility-administered encounter medications on file as of 4/20/2020. Review of Symptoms:    Review of Systems   Constitutional: Negative. Negative for chills and fever. HENT: Negative. Negative for hearing loss. Respiratory: Negative. Negative for cough, hemoptysis and shortness of breath. Cardiovascular: Negative. Negative for chest pain, palpitations, orthopnea, claudication, leg swelling and PND. Gastrointestinal: Negative. Negative for nausea and vomiting. Musculoskeletal: Negative for myalgias. Skin: Negative. Negative for rash. Neurological: Negative. Negative for dizziness, loss of consciousness and headaches. Physical Exam:      General: Well developed, in no acute distress, cooperative and alert  HEENT: trach is midline. PEERL, EOM intact. Respiratory: No audible wheezing, no acute respiratory distress, normal effort of breathing  Extremities:  No cyanosis, atraumatic. No lower extremity edema. Neuro: A&Ox3, speech clear  Skin: Skin color is normal. No rashes or lesions.  Non diaphoretic  Due to this being a TeleHealth evaluation, many elements of the physical examination are unable to be assessed. Cardiology Labs:    No results found for: FLP, CHOL, HDL, LDLC, VLDL, CHHD    No results found for: HBA1C, ZGR7BGHU, IBN3TTHC    Lab Results   Component Value Date/Time    Sodium 138 10/20/2017 04:19 AM    Potassium 3.7 10/20/2017 04:19 AM    Chloride 105 10/20/2017 04:19 AM    CO2 30 10/20/2017 04:19 AM    Glucose 124 (H) 10/20/2017 04:19 AM    BUN 9 10/20/2017 04:19 AM    Creatinine 0.43 (L) 10/20/2017 04:19 AM    BUN/Creatinine ratio 21 (H) 10/20/2017 04:19 AM    GFR est AA >60 10/20/2017 04:19 AM    GFR est non-AA >60 10/20/2017 04:19 AM    Calcium 7.8 (L) 10/20/2017 04:19 AM    Anion gap 3 (L) 10/20/2017 04:19 AM    Bilirubin, total 0.5 10/12/2017 11:09 AM    ALT (SGPT) 20 10/12/2017 11:09 AM    AST (SGOT) 22 10/12/2017 11:09 AM    Alk. phosphatase 107 10/12/2017 11:09 AM    Protein, total 7.5 10/12/2017 11:09 AM    Albumin 4.0 10/12/2017 11:09 AM    Globulin 3.5 10/12/2017 11:09 AM    A-G Ratio 1.1 10/12/2017 11:09 AM          Assessment:       ICD-10-CM ICD-9-CM    1. Preop cardiovascular exam Z01.810 V72.81    2. Second degree AV block I44.1 426.13         Plan:     1. Preop cardiovascular exam  Cleared for surgery from cardiac stand point. 2. Second degree AV block  Will evaluate with holter monitor. She is no AV allen agents and is asymptomatic. Unless significant pauses, no treatment indicated. Norina Lesch, MD        This visit was completed using [employer] Virtual Visit telemedicine services    Pursuant to the emergency declaration under the [facility] waiver authority and the Coronavirus Preparedness and [employer] Act, this Virtual Visit was conducted, with patient's consent, to reduce the patient's risk of exposure to COVID-19 and provide continuity of care for an established patient.  Services were provided through a video synchronous discussion virtually to substitute for in-person clinic visit.

## 2020-04-24 ENCOUNTER — CLINICAL SUPPORT (OUTPATIENT)
Dept: CARDIOLOGY CLINIC | Age: 75
End: 2020-04-24

## 2020-04-24 DIAGNOSIS — I44.1 SECOND DEGREE AV BLOCK: ICD-10-CM

## 2020-04-24 DIAGNOSIS — R01.1 HEART MURMUR: ICD-10-CM

## 2020-04-30 ENCOUNTER — VIRTUAL VISIT (OUTPATIENT)
Dept: CARDIOLOGY CLINIC | Age: 75
End: 2020-04-30

## 2020-04-30 ENCOUNTER — TELEPHONE (OUTPATIENT)
Dept: CARDIOLOGY CLINIC | Age: 75
End: 2020-04-30

## 2020-04-30 VITALS
BODY MASS INDEX: 21.6 KG/M2 | HEIGHT: 60 IN | HEART RATE: 90 BPM | WEIGHT: 110 LBS | SYSTOLIC BLOOD PRESSURE: 167 MMHG | DIASTOLIC BLOOD PRESSURE: 104 MMHG

## 2020-04-30 DIAGNOSIS — I10 ESSENTIAL HYPERTENSION: ICD-10-CM

## 2020-04-30 DIAGNOSIS — I73.9 PVD (PERIPHERAL VASCULAR DISEASE) (HCC): Primary | ICD-10-CM

## 2020-04-30 DIAGNOSIS — I44.1 SECOND DEGREE AV BLOCK: ICD-10-CM

## 2020-04-30 NOTE — PROGRESS NOTES
Nancy Bah MD    Cardiology Telemedicine Encounter                                                         Pursuant to the emergency declaration under the [facility], [facility] waiver authority and the Coronavirus Preparedness and [employer] Act, this Virtual  Visit was conducted, with patient's consent, to reduce the patient's risk of exposure to COVID-19 and provide continuity of care for an established patient. Services were provided through a video synchronous discussion virtually to substitute for in-person clinic visit. Subjective/HPI:   Keven Martell is a 69 y.o. female who was seen by synchronous (real-time) audio-video technology on 4/30/2020. Ms Allan Benoit is a pleasant lady referred for intermittent 2:1 heart block noted on a monitor. She complains of fatigue.     PCP Provider  Monty Chavez MD  Past Medical History:   Diagnosis Date    Arthritis     Arthritis     Asthma     Depression     GERD (gastroesophageal reflux disease)     Heart murmur     benign    Hypertension     not on medication    Ill-defined condition     cerebral atherosclerosis    Ill-defined condition     pancreatitis    Ill-defined condition     polyneuropathy    Ill-defined condition     chronic opiod use    Lung disease     Seizures ([address]     2016 last seizure    Stroke ([address]     tia    Thromboembolus ([address]     right leg      Past Surgical History:   Procedure Laterality Date    BREAST SURGERY PROCEDURE UNLISTED      left breast lumpectomy    HX APPENDECTOMY  1972    HX HAMMER TOE REPAIR Right 7/302017    HX MOHS PROCEDURES Left 2009    HX ORTHOPAEDIC  2006    back    HX OTHER SURGICAL      Removal of blood clot left side of neck    HX OTHER SURGICAL      Neck    HX ROTATOR CUFF REPAIR Right 8/2018    HX TUBAL LIGATION  1972    VASCULAR SURGERY PROCEDURE UNLIST      right carotid endarterectomy     Allergies   Allergen Reactions    Penicillins Angioedema    Ampicillin Itching    Diazepam Vertigo    Tramadol Itching      Family History   Problem Relation Age of Onset    Hypertension Mother     Lupus Sister     Cancer Brother         throat      Current Outpatient Medications   Medication Sig    fluticasone propion-salmeteroL (Advair Diskus) 500-50 mcg/dose diskus inhaler Take 1 Puff by inhalation two (2) times a day.  potassium chloride SR (K-TAB) 20 mEq tablet Take 1 Tab by mouth daily.  gabapentin (NEURONTIN) 600 mg tablet Take 1 Tab by mouth three (3) times daily. Max Daily Amount: 1,800 mg. For Trigeminal Neuralgia    diazePAM (VALIUM) 5 mg tablet Take 1 Tab by mouth two (2) times a day. Max Daily Amount: 10 mg.  predniSONE (DELTASONE) 20 mg tablet Take 20 mg by mouth two (2) times a day.  amLODIPine (NORVASC) 5 mg tablet Take 5 mg by mouth daily.  albuterol (PROVENTIL VENTOLIN) 2.5 mg /3 mL (0.083 %) nebulizer solution 1.25 mg by Nebulization route once.  FLUoxetine (PROZAC) 40 mg capsule Take 40 mg by mouth daily.  QUEtiapine (SEROQUEL) 100 mg tablet 100 mg nightly.  baclofen (LIORESAL) 10 mg tablet TAKE 1 TABLET BY MOUTH AT BEDTIME    FLOVENT  mcg/actuation inhaler INHALE 2 PUFFS BY MOUTH TWICE A DAY    diclofenac EC (VOLTAREN) 75 mg EC tablet Take  by mouth two (2) times a day.  Aspirin, Buffered 81 mg tab Take  by mouth.  clopidogrel (PLAVIX) 75 mg tablet Take 75 mg by mouth daily.  oxyCODONE-acetaminophen (PERCOCET 7.5) 7.5-325 mg per tablet oxycodone-acetaminophen 7.5 mg-325 mg tablet    HYDROcodone-acetaminophen (NORCO) 5-325 mg per tablet Take  by mouth every four to six (4-6) hours as needed for Pain.  enoxaparin (LOVENOX) 40 mg/0.4 mL INJECT 1 SYRINGE DAILY FOR 3 DAYS     No current facility-administered medications for this visit. Vitals:    04/30/20 0935   BP: (!) 167/104   Pulse: 90   Weight: 110 lb (49.9 kg)   Height: 5' (1.524 m)     Social History     Socioeconomic History    Marital status:      Spouse name: Not on file    Number of children: Not on file    Years of education: Not on file    Highest education level: Not on file   Occupational History    Not on file   Social Needs    Financial resource strain: Not on file    Food insecurity     Worry: Not on file     Inability: Not on file    Transportation needs     Medical: Not on file     Non-medical: Not on file   Tobacco Use    Smoking status: Current Every Day Smoker     Packs/day: 0.50     Years: 30.00     Pack years: 15.00    Smokeless tobacco: Never Used   Substance and Sexual Activity    Alcohol use: Yes     Alcohol/week: 7.0 standard drinks     Types: 7 Cans of beer per week     Comment: one beer a day    Drug use: Yes     Frequency: 7.0 times per week     Types: Marijuana    Sexual activity: Not on file   Lifestyle    Physical activity     Days per week: Not on file     Minutes per session: Not on file    Stress: Not on file   Relationships    Social connections     Talks on phone: Not on file     Gets together: Not on file     Attends [employer] service: Not on file     Active member of club or organization: Not on file     Attends meetings of clubs or organizations: Not on file     Relationship status: Not on file    Intimate partner violence     Fear of current or ex partner: Not on file     Emotionally abused: Not on file     Physically abused: Not on file     Forced sexual activity: Not on file   Other Topics Concern    Not on file   Social History Narrative    Not on file       Review of Symptoms    General: Pt denies excessive weight gain or loss. Pt is able to conduct ADL's, +fatigue  HEENT: Denies blurred vision, headaches, hearing loss, epistaxis and difficulty swallowing.   Respiratory: Denies cough, congestion, shortness of breath, MAY, wheezing or stridor. Cardiovascular: Denies precordial pain, palpitations, edema or PND  Gastrointestinal: Denies poor appetite, indigestion, abdominal pain or blood in stool  Genitourinary: Denies hematuria, dysuria, increased urinary frequency  Musculoskeletal: Denies joint pain or swelling from muscles or joints  Neurologic: Denies tremor, paresthesias, headache, or sensory motor disturbance  Psychiatric: Denies confusion, insomnia, depression  Integumentray: Denies rash, itching or ulcers. Hematologic: Denies easy bruising, bleeding    Physical Exam:    Due to this being a TeleHealth evaluation, many elements of the physical examination are unable to be assessed. General: Well developed, in no acute distress, cooperative and alert  HEENT: Pupils equal/round. No marked JVD visible on video. Respiratory: No audible wheezing, no tripoding, no signs of respiratory distress, lips non cyanotic  Extremities:  No edema  Musuc; moves all extremities, steady gait. Neuro: A&Ox3, speech clear, answering questions appropriately  Skin: Skin color is normal. No rashes or lesions. Non diaphoretic on visible skin during exam  Psych: Normal mood, affect, thoughts clear    Cardiology Labs:  No results found for: CHOL, CHOLX, CHLST, CHOLV, 800772, HDL, HDLP, LDL, LDLC, DLDLP, TGLX, TRIGL, TRIGP, CHHD, CHHDX    Lab Results   Component Value Date/Time    Sodium 138 10/20/2017 04:19 AM    Potassium 3.7 10/20/2017 04:19 AM    Chloride 105 10/20/2017 04:19 AM    CO2 30 10/20/2017 04:19 AM    Anion gap 3 (L) 10/20/2017 04:19 AM    Glucose 124 (H) 10/20/2017 04:19 AM    BUN 9 10/20/2017 04:19 AM    Creatinine 0.43 (L) 10/20/2017 04:19 AM    BUN/Creatinine ratio 21 (H) 10/20/2017 04:19 AM    GFR est AA >60 10/20/2017 04:19 AM    GFR est non-AA >60 10/20/2017 04:19 AM    Calcium 7.8 (L) 10/20/2017 04:19 AM    Bilirubin, total 0.5 10/12/2017 11:09 AM    AST (SGOT) 22 10/12/2017 11:09 AM    Alk.  phosphatase 107 10/12/2017 11:09 AM    Protein, total 7.5 10/12/2017 11:09 AM    Albumin 4.0 10/12/2017 11:09 AM    Globulin 3.5 10/12/2017 11:09 AM    A-G Ratio 1.1 10/12/2017 11:09 AM    ALT (SGPT) 20 10/12/2017 11:09 AM         Assessment:     Diagnoses and all orders for this visit:    1. PVD (peripheral vascular disease) ([facility]  -     CBC WITH AUTOMATED DIFF  -     PROTHROMBIN TIME + INR  -     METABOLIC PANEL, COMPREHENSIVE  -     XR CHEST PA LAT; Future    2. Second degree AV block  -     CBC WITH AUTOMATED DIFF  -     PROTHROMBIN TIME + INR  -     METABOLIC PANEL, COMPREHENSIVE  -     XR CHEST PA LAT; Future    3. Essential hypertension  -     CBC WITH AUTOMATED DIFF  -     PROTHROMBIN TIME + INR  -     METABOLIC PANEL, COMPREHENSIVE  -     XR CHEST PA LAT; Future        ICD-10-CM ICD-9-CM    1. PVD (peripheral vascular disease) (HCC) I73.9 443.9 CBC WITH AUTOMATED DIFF      PROTHROMBIN TIME + INR      METABOLIC PANEL, COMPREHENSIVE      XR CHEST PA LAT   2. Second degree AV block I44.1 426.13 CBC WITH AUTOMATED DIFF      PROTHROMBIN TIME + INR      METABOLIC PANEL, COMPREHENSIVE      XR CHEST PA LAT   3. Essential hypertension I10 401.9 CBC WITH AUTOMATED DIFF      PROTHROMBIN TIME + INR      METABOLIC PANEL, COMPREHENSIVE      XR CHEST PA LAT     Orders Placed This Encounter    XR CHEST PA LAT     Standing Status:   Future     Standing Expiration Date:   5/30/2020     Order Specific Question:   Reason for Exam     Answer:   pre op    CBC WITH AUTOMATED DIFF    PROTHROMBIN TIME + INR    METABOLIC PANEL, COMPREHENSIVE        Plan:   Mario Nobles is a pleasant lady with intermittent 2:1 heart block and fatigue. She is a candidate for a dual chamber pacemaker. I discussed the risks/benefits/alternatives of the procedure with the patient. Risks include (but are not limited to) bleeding, heart block, infection, cva/mi/tamponade/death. The patient understands and agrees to proceed. Thank you for this interesting consultation.             We discussed the expected course, resolution and complications of the diagnosis(es) in detail. Medication risks, benefits, costs, interactions, and alternatives were discussed as indicated. I advised her to contact the office if her condition worsens, changes or fails to improve as anticipated. She expressed understanding with the diagnosis(es) and plan    Antoinette Cervantes MD    Greater than 20 minutes was spent in direct video patient care, planning and chart review. This visit was conducted using [employer] telemedicine services.

## 2020-04-30 NOTE — PROGRESS NOTES
Chief Complaint   Patient presents with    Referral / Consult     Second degree AV block    Her surgery for back problems has been postponed due to this.

## 2020-04-30 NOTE — H&P (VIEW-ONLY)
[facility] Brian Lehman MD 
 
Cardiology Telemedicine Encounter Pursuant to the emergency declaration under the [facility] waiver authority and the [employer] Act, this Virtual  Visit was conducted, with patient's consent, to reduce the patient's risk of exposure to COVID-19 and provide continuity of care for an established patient. Services were provided through a video synchronous discussion virtually to substitute for in-person clinic visit. Subjective/HPI:  
Gordo Fritz is a 69 y.o. female who was seen by synchronous (real-time) audio-video technology on 4/30/2020. Ms Neha Rich is a pleasant lady referred for intermittent 2:1 heart block noted on a monitor. She complains of fatigue. PCP Provider Belén Alonso MD 
Past Medical History:  
Diagnosis Date  Arthritis  Arthritis  Asthma  Depression  GERD (gastroesophageal reflux disease)  Heart murmur   
 benign  Hypertension   
 not on medication  Ill-defined condition   
 cerebral atherosclerosis  Ill-defined condition   
 pancreatitis  Ill-defined condition   
 polyneuropathy  Ill-defined condition   
 chronic opiod use  Lung disease  Seizures ([address] 2016 last seizure  Stroke ([address]   
 tia  Thromboembolus ([address] right leg Past Surgical History:  
Procedure Laterality Date  BREAST SURGERY PROCEDURE UNLISTED    
 left breast lumpectomy [address]  HX HAMMER TOE REPAIR Right 7/302017  HX MOHS PROCEDURES Left 2009  HX ORTHOPAEDIC  2006  
 back  HX OTHER SURGICAL Removal of blood clot left side of neck  HX OTHER SURGICAL Neck  [facility]  VASCULAR SURGERY PROCEDURE UNLIST    
 right carotid endarterectomy Allergies Allergen Reactions  Penicillins Angioedema  Ampicillin Itching  Diazepam Vertigo  Tramadol Itching Family History Problem Relation Age of Onset  Hypertension Mother  Lupus Sister  Cancer Brother   
     throat Current Outpatient Medications Medication Sig  
 fluticasone propion-salmeteroL (Advair Diskus) 500-50 mcg/dose diskus inhaler Take 1 Puff by inhalation two (2) times a day.  potassium chloride SR (K-TAB) 20 mEq tablet Take 1 Tab by mouth daily.  gabapentin (NEURONTIN) 600 mg tablet Take 1 Tab by mouth three (3) times daily. Max Daily Amount: 1,800 mg. For Trigeminal Neuralgia  diazePAM (VALIUM) 5 mg tablet Take 1 Tab by mouth two (2) times a day. Max Daily Amount: 10 mg.  predniSONE (DELTASONE) 20 mg tablet Take 20 mg by mouth two (2) times a day.  amLODIPine (NORVASC) 5 mg tablet Take 5 mg by mouth daily.  albuterol (PROVENTIL VENTOLIN) 2.5 mg /3 mL (0.083 %) nebulizer solution 1.25 mg by Nebulization route once.  FLUoxetine (PROZAC) 40 mg capsule Take 40 mg by mouth daily.  QUEtiapine (SEROQUEL) 100 mg tablet 100 mg nightly.  baclofen (LIORESAL) 10 mg tablet TAKE 1 TABLET BY MOUTH AT BEDTIME  FLOVENT  mcg/actuation inhaler INHALE 2 PUFFS BY MOUTH TWICE A DAY  diclofenac EC (VOLTAREN) 75 mg EC tablet Take  by mouth two (2) times a day.  Aspirin, Buffered 81 mg tab Take  by mouth.  clopidogrel (PLAVIX) 75 mg tablet Take 75 mg by mouth daily.  oxyCODONE-acetaminophen (PERCOCET 7.5) 7.5-325 mg per tablet oxycodone-acetaminophen 7.5 mg-325 mg tablet  HYDROcodone-acetaminophen (NORCO) 5-325 mg per tablet Take  by mouth every four to six (4-6) hours as needed for Pain.  enoxaparin (LOVENOX) 40 mg/0.4 mL INJECT 1 SYRINGE DAILY FOR 3 DAYS No current facility-administered medications for this visit. Vitals:  
 04/30/20 0935 BP: (!) 167/104 Pulse: 90 Weight: 110 lb (49.9 kg) Height: 5' (1.524 m) Social History Socioeconomic History  Marital status:  Spouse name: Not on file  Number of children: Not on file  Years of education: Not on file  Highest education level: Not on file Occupational History  Not on file Social Needs  Financial resource strain: Not on file  Food insecurity Worry: Not on file Inability: Not on file  Transportation needs Medical: Not on file Non-medical: Not on file Tobacco Use  Smoking status: Current Every Day Smoker Packs/day: 0.50 Years: 30.00 Pack years: 15.00  Smokeless tobacco: Never Used Substance and Sexual Activity  Alcohol use: Yes Alcohol/week: 7.0 standard drinks Types: 7 Cans of beer per week Comment: one beer a day  Drug use: Yes Frequency: 7.0 times per week Types: Marijuana  Sexual activity: Not on file Lifestyle  Physical activity Days per week: Not on file Minutes per session: Not on file  Stress: Not on file Relationships  Social connections Talks on phone: Not on file Gets together: Not on file Attends Congregational service: Not on file Active member of club or organization: Not on file Attends meetings of clubs or organizations: Not on file Relationship status: Not on file  Intimate partner violence Fear of current or ex partner: Not on file Emotionally abused: Not on file Physically abused: Not on file Forced sexual activity: Not on file Other Topics Concern  Not on file Social History Narrative  Not on file Review of Symptoms General: Pt denies excessive weight gain or loss. Pt is able to conduct ADL's, +fatigue HEENT: Denies blurred vision, headaches, hearing loss, epistaxis and difficulty swallowing. Respiratory: Denies cough, congestion, shortness of breath, MAY, wheezing or stridor. Cardiovascular: Denies precordial pain, palpitations, edema or PND Gastrointestinal: Denies poor appetite, indigestion, abdominal pain or blood in stool Genitourinary: Denies hematuria, dysuria, increased urinary frequency Musculoskeletal: Denies joint pain or swelling from muscles or joints Neurologic: Denies tremor, paresthesias, headache, or sensory motor disturbance Psychiatric: Denies confusion, insomnia, depression Integumentray: Denies rash, itching or ulcers. Hematologic: Denies easy bruising, bleeding Physical Exam:   
Due to this being a TeleHealth evaluation, many elements of the physical examination are unable to be assessed. General: Well developed, in no acute distress, cooperative and alert HEENT: Pupils equal/round. No marked JVD visible on video. Respiratory: No audible wheezing, no tripoding, no signs of respiratory distress, lips non cyanotic Extremities:  No edema Musuc; moves all extremities, steady gait. Neuro: A&Ox3, speech clear, answering questions appropriately Skin: Skin color is normal. No rashes or lesions. Non diaphoretic on visible skin during exam 
Psych: Normal mood, affect, thoughts clear Cardiology Labs: 
No results found for: CHOL, CHOLX, CHLST, [address], [address], HDL, HDLP, LDL, LDLC, DLDLP, TGLX, TRIGL, TRIGP, CHHD, CHHDX Lab Results Component Value Date/Time  Sodium 138 10/20/2017 04:19 AM  
 Potassium 3.7 10/20/2017 04:19 AM  
 Chloride 105 10/20/2017 04:19 AM  
 CO2 30 10/20/2017 04:19 AM  
 Anion gap 3 (L) 10/20/2017 04:19 AM  
 Glucose 124 (H) 10/20/2017 04:19 AM  
 BUN 9 10/20/2017 04:19 AM  
 Creatinine 0.43 (L) 10/20/2017 04:19 AM  
 BUN/Creatinine ratio 21 (H) 10/20/2017 04:19 AM  
 GFR est AA >60 10/20/2017 04:19 AM  
 GFR est non-AA >60 10/20/2017 04:19 AM  
 Calcium 7.8 (L) 10/20/2017 04:19 AM  
 Bilirubin, total 0.5 10/12/2017 11:09 AM  
 AST (SGOT) 22 10/12/2017 11:09 AM  
 Alk. phosphatase 107 10/12/2017 11:09 AM  
 Protein, total 7.5 10/12/2017 11:09 AM  
 Albumin 4.0 10/12/2017 11:09 AM  
 Globulin 3.5 10/12/2017 11:09 AM  
 A-G Ratio 1.1 10/12/2017 11:09 AM  
 ALT (SGPT) 20 10/12/2017 11:09 AM  
  
 
 Assessment:  
 
Diagnoses and all orders for this visit: 1. PVD (peripheral vascular disease) [address] 
-     CBC WITH AUTOMATED DIFF 
-     PROTHROMBIN TIME + INR 
-     METABOLIC PANEL, COMPREHENSIVE 
-     XR CHEST PA LAT; Future 2. Second degree AV block 
-     CBC WITH AUTOMATED DIFF 
-     PROTHROMBIN TIME + INR 
-     METABOLIC PANEL, COMPREHENSIVE 
-     XR CHEST PA LAT; Future 3. Essential hypertension 
-     CBC WITH AUTOMATED DIFF 
-     PROTHROMBIN TIME + INR 
-     METABOLIC PANEL, COMPREHENSIVE 
-     XR CHEST PA LAT; Future ICD-10-CM ICD-9-CM 1. PVD (peripheral vascular disease) (HCC) I73.9 443.9 CBC WITH AUTOMATED DIFF PROTHROMBIN TIME + INR  
   METABOLIC PANEL, COMPREHENSIVE  
   XR CHEST PA LAT 2. Second degree AV block I44.1 426.13 CBC WITH AUTOMATED DIFF PROTHROMBIN TIME + INR  
   METABOLIC PANEL, COMPREHENSIVE  
   XR CHEST PA LAT 3. Essential hypertension I10 401.9 CBC WITH AUTOMATED DIFF PROTHROMBIN TIME + INR  
   METABOLIC PANEL, COMPREHENSIVE  
   XR CHEST PA LAT Orders Placed This Encounter  XR CHEST PA LAT Standing Status:   Future Standing Expiration Date:   5/30/2020 Order Specific Question:   Reason for Exam  
  Answer:   pre op  CBC WITH AUTOMATED DIFF  
 PROTHROMBIN TIME + INR  METABOLIC PANEL, COMPREHENSIVE Plan:  
Aniket Del Rio is a pleasant lady with intermittent 2:1 heart block and fatigue. She is a candidate for a dual chamber pacemaker. I discussed the risks/benefits/alternatives of the procedure with the patient.  Risks include (but are not limited to) bleeding, heart block, infection, cva/mi/tamponade/death. The patient understands and agrees to proceed. Thank you for this interesting consultation. We discussed the expected course, resolution and complications of the diagnosis(es) in detail. Medication risks, benefits, costs, interactions, and alternatives were discussed as indicated. I advised her to contact the office if her condition worsens, changes or fails to improve as anticipated. She expressed understanding with the diagnosis(es) and plan Yanna Dye MD 
 
Greater than 20 minutes was spent in direct video patient care, planning and chart review. This visit was conducted using [employer] telemedicine services.

## 2020-05-04 ENCOUNTER — OFFICE VISIT (OUTPATIENT)
Dept: URGENT CARE | Age: 75
End: 2020-05-04

## 2020-05-04 ENCOUNTER — TELEPHONE (OUTPATIENT)
Dept: CARDIOLOGY CLINIC | Age: 75
End: 2020-05-04

## 2020-05-04 ENCOUNTER — HOSPITAL ENCOUNTER (OUTPATIENT)
Dept: GENERAL RADIOLOGY | Age: 75
Discharge: HOME | End: 2020-05-04
Payer: MEDICARE

## 2020-05-04 VITALS — TEMPERATURE: 99 F | OXYGEN SATURATION: 97 % | RESPIRATION RATE: 16 BRPM | HEART RATE: 63 BPM

## 2020-05-04 DIAGNOSIS — I44.1 SECOND DEGREE AV BLOCK: ICD-10-CM

## 2020-05-04 DIAGNOSIS — Z11.59 SPECIAL SCREENING EXAMINATION FOR VIRAL DISEASE: Primary | ICD-10-CM

## 2020-05-04 DIAGNOSIS — I10 ESSENTIAL HYPERTENSION: ICD-10-CM

## 2020-05-04 DIAGNOSIS — I49.5 SSS (SICK SINUS SYNDROME) (HCC): Primary | ICD-10-CM

## 2020-05-04 DIAGNOSIS — I73.9 PVD (PERIPHERAL VASCULAR DISEASE) (HCC): ICD-10-CM

## 2020-05-04 PROCEDURE — 71046 X-RAY EXAM CHEST 2 VIEWS: CPT

## 2020-05-04 NOTE — PROGRESS NOTES
This patient was seen in [facility] at [address] [place] [address] Urgent Care while in their vehicle due to COVID-19 pandemic with PPE and focused examination in order to decrease community viral transmission. The patient/guardian gave verbal consent to treat. The history is provided by the patient.       Here for pre - procedure covid test  Going for pacemaker placement      Past Medical History:   Diagnosis Date    Arthritis     Arthritis     Asthma     Depression     GERD (gastroesophageal reflux disease)     Heart murmur     benign    Hypertension     not on medication    Ill-defined condition     cerebral atherosclerosis    Ill-defined condition     pancreatitis    Ill-defined condition     polyneuropathy    Ill-defined condition     chronic opiod use    Lung disease     Seizures ([address]     2016 last seizure    St[facility]     tia    Thromboembolus ([facility]     right leg        Past Surgical History:   Procedure Laterality Date    BREAST SURGERY PROCEDURE UNLISTED      left breast lumpectomy    HX APPENDECTOMY  1972    HX HAMMER TOE REPAIR Right 7/302017    HX MOHS PROCEDURES Left 2009    HX ORTHOPAEDIC  2006    back    HX OTHER SURGICAL      Removal of blood clot left side of neck    HX OTHER SURGICAL      Neck    HX ROTATOR CUFF REPAIR Right     8/2018    HX TUBAL LIGATION  1972    VASCULAR SURGERY PROCEDURE UNLIST      right carotid endarterectomy         Family History   Problem Relation Age of Onset    Hypertension Mother     Lupus Sister     Cancer Brother         throat        Social History     Socioeconomic History    Marital status:      Spouse name: Not on file    Number of children: Not on file    Years of education: Not on file    Highest education level: Not on file   Occupational History    Not on file   Social Needs    Financial resource strain: Not on file    Food insecurity     Worry: Not on file     Inability: Not on file   [facility] needs Medical: Not on file     Non-medical: Not on file   Tobacco Use    Smoking status: Current Every Day Smoker     Packs/day: 0.50     Years: 30.00     Pack years: 15.00    Smokeless tobacco: Never Used   Substance and Sexual Activity    Alcohol use: Yes     Alcohol/week: 7.0 standard drinks     Types: 7 Cans of beer per week     Comment: one beer a day    Drug use: Yes     Frequency: 7.0 times per week     Types: Marijuana    Sexual activity: Not on file   Lifestyle    Physical activity     Days per week: Not on file     Minutes per session: Not on file    Stress: Not on file   Relationships    Social connections     Talks on phone: Not on file     Gets together: Not on file     Attends Yarsanism service: Not on file     Active member of club or organization: Not on file     Attends meetings of clubs or organizations: Not on file     Relationship status: Not on file    Intimate partner violence     Fear of current or ex partner: Not on file     Emotionally abused: Not on file     Physically abused: Not on file     Forced sexual activity: Not on file   Other Topics Concern    Not on file   Social History Narrative    Not on file                ALLERGIES: Penicillins; Ampicillin; Diazepam; and Tramadol    Review of Systems   All other systems reviewed and are negative. Vitals:    05/04/20 1340   Pulse: 63   Resp: 16   Temp: 99 °F (37.2 °C)   SpO2: 97%       Physical Exam  Vitals signs and nursing note reviewed. Constitutional:       General: She is not in acute distress. Appearance: She is not ill-appearing, toxic-appearing or diaphoretic. MDM    Procedures      ICD-10-CM ICD-9-CM    1. Special screening examination for viral disease Z11.59 V73.99 NOVEL CORONAVIRUS (COVID-19)    covid       COVID- 19 TEST DONE    No orders of the defined types were placed in this encounter.     Results for orders placed or performed during the hospital encounter of 05/04/20   XR CHEST PA LAT    Narrative INDICATION: pre op    COMPARISON: October 12, 2017    FINDINGS:    Frontal and lateral views of the chest demonstrate a normal cardiomediastinal  silhouette. The lungs are hyperinflated. There is no edema, effusion,  consolidation, or pneumothorax. Calcified granuloma left lower lobe. Right  shoulder arthroplasty. Impression    IMPRESSION:  No acute process. The patients condition was discussed with the patient and they understand. The patient is to follow up with primary care doctor. If signs and symptoms become worse the pt is to go to the ER. The patient is to take medications as prescribed.

## 2020-05-05 LAB
ALBUMIN SERPL-MCNC: 4.4 G/DL (ref 3.7–4.7)
ALBUMIN/GLOB SERPL: 2.4 {RATIO} (ref 1.2–2.2)
ALP SERPL-CCNC: 104 IU/L (ref 39–117)
ALT SERPL-CCNC: 15 IU/L (ref 0–32)
AST SERPL-CCNC: 18 IU/L (ref 0–40)
BASOPHILS # BLD AUTO: 0 X10E3/UL (ref 0–0.2)
BASOPHILS NFR BLD AUTO: 0 %
BILIRUB SERPL-MCNC: 0.3 MG/DL (ref 0–1.2)
BUN SERPL-MCNC: 12 MG/DL (ref 8–27)
BUN/CREAT SERPL: 18 (ref 12–28)
CALCIUM SERPL-MCNC: 9.5 MG/DL (ref 8.7–10.3)
CHLORIDE SERPL-SCNC: 108 MMOL/L (ref 96–106)
CO2 SERPL-SCNC: 19 MMOL/L (ref 20–29)
CREAT SERPL-MCNC: 0.67 MG/DL (ref 0.57–1)
EOSINOPHIL # BLD AUTO: 0 X10E3/UL (ref 0–0.4)
EOSINOPHIL NFR BLD AUTO: 0 %
ERYTHROCYTE [DISTWIDTH] IN BLOOD BY AUTOMATED COUNT: 12.2 % (ref 11.7–15.4)
GLOBULIN SER CALC-MCNC: 1.8 G/DL (ref 1.5–4.5)
GLUCOSE SERPL-MCNC: 151 MG/DL (ref 65–99)
HCT VFR BLD AUTO: 44.7 % (ref 34–46.6)
HGB BLD-MCNC: 14.9 G/DL (ref 11.1–15.9)
IMM GRANULOCYTES # BLD AUTO: 0 X10E3/UL (ref 0–0.1)
IMM GRANULOCYTES NFR BLD AUTO: 0 %
INR PPP: 1 (ref 0.8–1.2)
LYMPHOCYTES # BLD AUTO: 2 X10E3/UL (ref 0.7–3.1)
LYMPHOCYTES NFR BLD AUTO: 18 %
MCH RBC QN AUTO: 33.9 PG (ref 26.6–33)
MCHC RBC AUTO-ENTMCNC: 33.3 G/DL (ref 31.5–35.7)
MCV RBC AUTO: 102 FL (ref 79–97)
MONOCYTES # BLD AUTO: 0.7 X10E3/UL (ref 0.1–0.9)
MONOCYTES NFR BLD AUTO: 6 %
NEUTROPHILS # BLD AUTO: 8 X10E3/UL (ref 1.4–7)
NEUTROPHILS NFR BLD AUTO: 76 %
PLATELET # BLD AUTO: 219 X10E3/UL (ref 150–450)
POTASSIUM SERPL-SCNC: 3.7 MMOL/L (ref 3.5–5.2)
PROT SERPL-MCNC: 6.2 G/DL (ref 6–8.5)
PROTHROMBIN TIME: 10.5 SEC (ref 9.1–12)
RBC # BLD AUTO: 4.39 X10E6/UL (ref 3.77–5.28)
SODIUM SERPL-SCNC: 143 MMOL/L (ref 134–144)
WBC # BLD AUTO: 10.7 X10E3/UL (ref 3.4–10.8)

## 2020-05-06 LAB — SARS-COV-2, NAA: NOT DETECTED

## 2020-05-08 ENCOUNTER — APPOINTMENT (OUTPATIENT)
Dept: GENERAL RADIOLOGY | Age: 75
End: 2020-05-08
Attending: INTERNAL MEDICINE
Payer: MEDICARE

## 2020-05-08 ENCOUNTER — HOSPITAL ENCOUNTER (OUTPATIENT)
Age: 75
Discharge: HOME | End: 2020-05-08
Attending: INTERNAL MEDICINE | Admitting: INTERNAL MEDICINE
Payer: MEDICARE

## 2020-05-08 VITALS
RESPIRATION RATE: 16 BRPM | HEIGHT: 60 IN | OXYGEN SATURATION: 100 % | TEMPERATURE: 97.2 F | HEART RATE: 79 BPM | DIASTOLIC BLOOD PRESSURE: 68 MMHG | BODY MASS INDEX: 21.6 KG/M2 | WEIGHT: 110 LBS | SYSTOLIC BLOOD PRESSURE: 133 MMHG

## 2020-05-08 DIAGNOSIS — I49.5 SSS (SICK SINUS SYNDROME) (HCC): ICD-10-CM

## 2020-05-08 PROCEDURE — 74011250636 HC RX REV CODE- 250/636: Performed by: INTERNAL MEDICINE

## 2020-05-08 PROCEDURE — 77030037875 HC DRSG MEPILEX <16IN BORD MOLN -A: Performed by: INTERNAL MEDICINE

## 2020-05-08 PROCEDURE — 77030018673: Performed by: INTERNAL MEDICINE

## 2020-05-08 PROCEDURE — 33208 INSRT HEART PM ATRIAL & VENT: CPT | Performed by: INTERNAL MEDICINE

## 2020-05-08 PROCEDURE — C1894 INTRO/SHEATH, NON-LASER: HCPCS | Performed by: INTERNAL MEDICINE

## 2020-05-08 PROCEDURE — 77030031139 HC SUT VCRL2 J&J -A: Performed by: INTERNAL MEDICINE

## 2020-05-08 PROCEDURE — 74011000250 HC RX REV CODE- 250: Performed by: INTERNAL MEDICINE

## 2020-05-08 PROCEDURE — 71045 X-RAY EXAM CHEST 1 VIEW: CPT

## 2020-05-08 PROCEDURE — C1898 LEAD, PMKR, OTHER THAN TRANS: HCPCS | Performed by: INTERNAL MEDICINE

## 2020-05-08 PROCEDURE — 99152 MOD SED SAME PHYS/QHP 5/>YRS: CPT | Performed by: INTERNAL MEDICINE

## 2020-05-08 PROCEDURE — C1893 INTRO/SHEATH, FIXED,NON-PEEL: HCPCS | Performed by: INTERNAL MEDICINE

## 2020-05-08 PROCEDURE — 74011250637 HC RX REV CODE- 250/637: Performed by: INTERNAL MEDICINE

## 2020-05-08 PROCEDURE — C1785 PMKR, DUAL, RATE-RESP: HCPCS | Performed by: INTERNAL MEDICINE

## 2020-05-08 PROCEDURE — 77030018729 HC ELECTRD DEFIB PAD CARD -B: Performed by: INTERNAL MEDICINE

## 2020-05-08 PROCEDURE — 77030002996 HC SUT SLK J&J -A: Performed by: INTERNAL MEDICINE

## 2020-05-08 DEVICE — PCMKR AZURE XT DR MRI --: Type: IMPLANTABLE DEVICE | Status: FUNCTIONAL

## 2020-05-08 DEVICE — LEAD PCMKR CAPSUR FIX NOVUS 52 --: Type: IMPLANTABLE DEVICE | Status: FUNCTIONAL

## 2020-05-08 DEVICE — LEAD PCMKR CAPSUR FIX NOVUS 58 --: Type: IMPLANTABLE DEVICE | Status: FUNCTIONAL

## 2020-05-08 RX ORDER — ACETAMINOPHEN 325 MG/1
650 TABLET ORAL
Status: DISCONTINUED | OUTPATIENT
Start: 2020-05-08 | End: 2020-05-08 | Stop reason: HOSPADM

## 2020-05-08 RX ORDER — NALOXONE HYDROCHLORIDE 0.4 MG/ML
0.4 INJECTION, SOLUTION INTRAMUSCULAR; INTRAVENOUS; SUBCUTANEOUS AS NEEDED
Status: DISCONTINUED | OUTPATIENT
Start: 2020-05-08 | End: 2020-05-08 | Stop reason: HOSPADM

## 2020-05-08 RX ORDER — MIDAZOLAM HYDROCHLORIDE 1 MG/ML
INJECTION, SOLUTION INTRAMUSCULAR; INTRAVENOUS AS NEEDED
Status: DISCONTINUED | OUTPATIENT
Start: 2020-05-08 | End: 2020-05-08 | Stop reason: HOSPADM

## 2020-05-08 RX ORDER — HEPARIN SODIUM 200 [USP'U]/100ML
INJECTION, SOLUTION INTRAVENOUS
Status: COMPLETED | OUTPATIENT
Start: 2020-05-08 | End: 2020-05-08

## 2020-05-08 RX ORDER — SODIUM CHLORIDE 0.9 % (FLUSH) 0.9 %
5-40 SYRINGE (ML) INJECTION AS NEEDED
Status: DISCONTINUED | OUTPATIENT
Start: 2020-05-08 | End: 2020-05-08 | Stop reason: HOSPADM

## 2020-05-08 RX ORDER — BACITRACIN 50000 [IU]/1
INJECTION, POWDER, FOR SOLUTION INTRAMUSCULAR AS NEEDED
Status: DISCONTINUED | OUTPATIENT
Start: 2020-05-08 | End: 2020-05-08 | Stop reason: HOSPADM

## 2020-05-08 RX ORDER — LIDOCAINE HYDROCHLORIDE 10 MG/ML
INJECTION, SOLUTION EPIDURAL; INFILTRATION; INTRACAUDAL; PERINEURAL AS NEEDED
Status: DISCONTINUED | OUTPATIENT
Start: 2020-05-08 | End: 2020-05-08 | Stop reason: HOSPADM

## 2020-05-08 RX ORDER — FENTANYL CITRATE 50 UG/ML
INJECTION, SOLUTION INTRAMUSCULAR; INTRAVENOUS AS NEEDED
Status: DISCONTINUED | OUTPATIENT
Start: 2020-05-08 | End: 2020-05-08 | Stop reason: HOSPADM

## 2020-05-08 RX ORDER — SODIUM CHLORIDE 0.9 % (FLUSH) 0.9 %
5-40 SYRINGE (ML) INJECTION EVERY 8 HOURS
Status: DISCONTINUED | OUTPATIENT
Start: 2020-05-08 | End: 2020-05-08 | Stop reason: HOSPADM

## 2020-05-08 RX ADMIN — ACETAMINOPHEN 650 MG: 325 TABLET, FILM COATED ORAL at 15:43

## 2020-05-08 NOTE — DISCHARGE INSTRUCTIONS
[address]  [phone]        NEW PACEMAKER IMPLANT DISCHARGE INSTRUCTIONS    Patient ID:  Andreea Brennan  [id number]  13 y.o.  1945    Admit Date: 5/8/2020    Discharge Date: 5/8/2020     Admitting Physician: Bakari Hawley MD     Discharge Physician: Bakari Hawley MD    Admission Diagnoses:   SSS (sick sinus syndrome) [facility]) [I49.5]    Discharge Diagnoses: Active Problems:    * No active hospital problems. *      Discharge Condition: Good    Cardiology Procedures this Admission:  Pacemaker insertion. Disposition: home    Reference discharge instructions provided by nursing for diet and activity. Follow-up with device clinic in three weeks. Call [phone] to make an appointment. Signed:  SHERRY Brugos  5/8/2020  9:44 AM      DISCHARGE INSTRUCTIONS FOR PATIENTS WITH PACEMAKERS    1. Remember to call for an appointment for 3 weeks [phone] to check healing and implant programming. 2. Medic Alert Bracelets are available from your pharmacist to wear at all times if you choose to wear one. 3. Carry your ID card for pacemaker with you at all times. This card will be given to you in the hospital or mailed to you. 4. The pacemaker will bulge slightly under your skin. The bulge will decrease in size over the next few weeks. Please notify the doctor's office if you notice any of the following around your site:   A.  A bruise that does not go away. B.  Soreness or yellow, green, or brown drainage from the site. C. Any swelling from the site. D. If you have a fever of 100 degrees or higher that lasts for a few days. INCISION CARE       1.  Leave the dressing over your site until your follow up visit. 2.  You may not shower until after follow up visit. 3.  For comfort, wear loose fitting clothing. 1. 4.  Ice pack to affected shoulder for first 24 hours, wear your sling for 2 days.   2. 5.  Report any signs of infection, fever, pain, swelling, redness, oozing, or heat at site especially if these symptoms increase after the first 3 to 4 days. ACTIVITY PRECAUTIONS     1. Avoid rough contact with the implant site. 2. No driving for 14 days. 3. Avoid lifting your arm over your head, carrying anything on the affected side, or lifting over 10 pounds for 90 days. For the first 2 days only bend your arm at the elbow. 4. Any extreme activity such as golf, weight lifting or exercise biking should be restricted for 60 days. 5. Do not carry objects by holding them against your implant site. 6.  No shooting rifles or any type of gun with the affected shoulder permanently. SPECIAL PRECAUTIONS     1. You should avoid all strong magnetic fields, such as arc welding, large transformers, large motors. 2.  You may or may not (depending on your device) have an MRI which uses a strong magnet to take pictures. 3.  Treatments or surgery that requires diathermy or electrocautery should be discussed with your doctor before scheduled. 4. Avoid radio frequency transmitters, including radar. 5. Advise dentist or other medical personnel you see that you have a pacemaker. 6.  Cell phones and microwave oven use is okay. 7.  If you plan to move or take a trip to a new area, the doctor's office will give you a name of a doctor to contact for any problems. ANTIBIOTIC THERAPY    During the first 8 weeks after your pacemaker insertion, you may need antibiotics before any dental work or certain tests or operations. Let the dentist or doctor who is caring for you know that you have had an implanted device.

## 2020-05-08 NOTE — INTERVAL H&P NOTE
Update History & Physical 
 
The Patient's History and Physical of April 30, 2020 was reviewed with the patient and I examined the patient. There was no change. The surgical site was confirmed by the patient and me. Plan:  The risk, benefits, expected outcome, and alternative to the recommended procedure have been discussed with the patient. Patient understands and wants to proceed with the procedure.  
 
Electronically signed by Mc Lantigua MD on 5/8/2020 at 9:50 AM

## 2020-05-08 NOTE — Clinical Note
TRANSFER - OUT REPORT:     Verbal report given to: Fredy Curling, RN. Report consisted of patient's Situation, Background, Assessment and   Recommendations(SBAR). Opportunity for questions and clarification was provided. Patient transported with a Registered Nurse. Patient transported to: IVCU.

## 2020-05-20 NOTE — PROGRESS NOTES
VIRTUAL VISIT DOCUMENTATION     Pursuant to the emergency declaration under the [facility], [facility] waiver authority and the [employer] Act, this Virtual  Visit was conducted, with patient's consent, to reduce the patient's risk of exposure to COVID-19 and provide continuity of care for an established patient. Services were provided through a video synchronous discussion virtually to substitute for in-person clinic visit. HISTORY OF PRESENTING ILLNESS      Josselyn Simmons is a 69 y.o. female who was seen by synchronous (real-time) audio-video technology on 5/21/2020 for wound check post-device implantation. The dressing was removed by pt per detailed instruction given and the site was inspected via video technology. The site appeared to be well-healing without ecchymosis/tenderness/erythema. Denies pain, fevers, discharge. She is for spinal surgery at [facility]. PHYSICAL EXAMINATION      Due to this being a TeleHealth evaluation, many elements of the physical examination are unable to be assessed. General: Well developed, in no acute distress, cooperative and alert  HEENT: Pupils equal/round. No marked JVD visible on video. Respiratory: No audible wheezing, no signs of respiratory distress, lips non cyanotic  Extremities:  No edema  Neuro: A&Ox3, speech clear, no facial droop, answering questions appropriately  Skin: +left subclavian incision well approximated, no erythema, ecchymosis, drainage     ASSESSMENT          ICD-10-CM ICD-9-CM    1. Second degree AV block I44.1 426.13    2. Heart murmur R01.1 785.2    3. SSS (sick sinus syndrome) ([facility]) I49.5 427.81      No orders of the defined types were placed in this encounter. PLAN     Pt is s/p dual chamber pm 5/8/2020. Site approximated well. No discharge. I discussed her upcoming surgery with  Her.  We do not recommend she have surgery prior to 8 weeks post operatively due to infection risk. Pt verbalizes understanding and is in agreement with the plan. Remote device download will be reviewed, one monitor is receieved,  and patient will be contacted for any abnormal findings. Continue follow up in device clinic. We discussed the expected course, resolution and complications of the diagnosis(es) in detail. Medication risks, benefits, costs, interactions, and alternatives were discussed as indicated. I advised her to contact the office if her condition worsens, changes or fails to improve as anticipated. She expressed understanding with the diagnosis(es) and plan     FOLLOW-UP   3 months as planned with SHERRY Michel    Greater than 20 minutes was spent in direct video patient care, planning and chart review. This visit was conducted using [employer] telemedicine services.

## 2020-05-21 ENCOUNTER — TELEPHONE (OUTPATIENT)
Dept: CARDIOLOGY CLINIC | Age: 75
End: 2020-05-21

## 2020-05-21 ENCOUNTER — DOCUMENTATION ONLY (OUTPATIENT)
Dept: CARDIOLOGY CLINIC | Age: 75
End: 2020-05-21

## 2020-05-21 ENCOUNTER — VIRTUAL VISIT (OUTPATIENT)
Dept: CARDIOLOGY CLINIC | Age: 75
End: 2020-05-21

## 2020-05-21 VITALS
HEIGHT: 60 IN | DIASTOLIC BLOOD PRESSURE: 95 MMHG | SYSTOLIC BLOOD PRESSURE: 158 MMHG | HEART RATE: 107 BPM | BODY MASS INDEX: 21.48 KG/M2

## 2020-05-21 DIAGNOSIS — R01.1 HEART MURMUR: ICD-10-CM

## 2020-05-21 DIAGNOSIS — I44.1 SECOND DEGREE AV BLOCK: Primary | ICD-10-CM

## 2020-05-21 DIAGNOSIS — I49.5 SSS (SICK SINUS SYNDROME) (HCC): ICD-10-CM

## 2020-05-21 NOTE — PROGRESS NOTES
Faxed clearance note with last office note and procedure note to MADHU/Eugenia @ [phone]. Pt is cleared for procedure at a low operative risk, needs to refer to Neurology or PCP for Plavix for hold instructions.

## 2020-06-01 ENCOUNTER — OFFICE VISIT (OUTPATIENT)
Dept: CARDIOLOGY CLINIC | Age: 75
End: 2020-06-01

## 2020-06-01 DIAGNOSIS — Z95.0 CARDIAC PACEMAKER IN SITU: Primary | ICD-10-CM

## 2020-06-01 DIAGNOSIS — I49.5 SSS (SICK SINUS SYNDROME) (HCC): ICD-10-CM

## 2021-01-12 ENCOUNTER — TRANSCRIBE ORDER (OUTPATIENT)
Dept: REGISTRATION | Age: 76
End: 2021-01-12

## 2021-01-12 ENCOUNTER — TELEPHONE (OUTPATIENT)
Dept: CARDIOLOGY CLINIC | Age: 76
End: 2021-01-12

## 2021-01-12 DIAGNOSIS — Z01.812 PRE-PROCEDURE LAB EXAM: Primary | ICD-10-CM

## 2021-01-12 NOTE — TELEPHONE ENCOUNTER
Patient called , has sores on her feet. She believes it coming from her pacemaker because never had problem since. Please call.  Thanks

## 2021-01-12 NOTE — TELEPHONE ENCOUNTER
Spoke with patient. She reports that Dr. Chinmay Morales has seen her feet and is doing a vascular procedure next week. She ask if the pacemaker could be causing her foot sores. Advised her that it is more likely related to her PVD.

## 2021-01-13 ENCOUNTER — HOSPITAL ENCOUNTER (OUTPATIENT)
Dept: PREADMISSION TESTING | Age: 76
Discharge: HOME | End: 2021-01-13
Payer: MEDICARE

## 2021-01-13 ENCOUNTER — TRANSCRIBE ORDER (OUTPATIENT)
Dept: REGISTRATION | Age: 76
End: 2021-01-13

## 2021-01-13 VITALS
HEIGHT: 60 IN | TEMPERATURE: 98.2 F | DIASTOLIC BLOOD PRESSURE: 75 MMHG | WEIGHT: 90.39 LBS | RESPIRATION RATE: 17 BRPM | HEART RATE: 73 BPM | SYSTOLIC BLOOD PRESSURE: 153 MMHG | BODY MASS INDEX: 17.75 KG/M2

## 2021-01-13 DIAGNOSIS — Z01.812 PRE-PROCEDURE LAB EXAM: Primary | ICD-10-CM

## 2021-01-13 DIAGNOSIS — Z01.812 PRE-PROCEDURE LAB EXAM: ICD-10-CM

## 2021-01-13 LAB
ABO + RH BLD: NORMAL
ALBUMIN SERPL-MCNC: 3.9 G/DL (ref 3.5–5)
ALBUMIN/GLOB SERPL: 1.6 {RATIO} (ref 1.1–2.2)
ALP SERPL-CCNC: 112 U/L (ref 45–117)
ALT SERPL-CCNC: 17 U/L (ref 12–78)
ANION GAP SERPL CALC-SCNC: 4 MMOL/L (ref 5–15)
APTT PPP: 23.7 SEC (ref 22.1–31)
AST SERPL-CCNC: 15 U/L (ref 15–37)
BASOPHILS # BLD: 0 K/UL (ref 0–0.1)
BASOPHILS NFR BLD: 1 % (ref 0–1)
BILIRUB SERPL-MCNC: 0.4 MG/DL (ref 0.2–1)
BLOOD GROUP ANTIBODIES SERPL: NORMAL
BUN SERPL-MCNC: 14 MG/DL (ref 6–20)
BUN/CREAT SERPL: 15 (ref 12–20)
CALCIUM SERPL-MCNC: 9.2 MG/DL (ref 8.5–10.1)
CHLORIDE SERPL-SCNC: 107 MMOL/L (ref 97–108)
CO2 SERPL-SCNC: 28 MMOL/L (ref 21–32)
CREAT SERPL-MCNC: 0.92 MG/DL (ref 0.55–1.02)
DIFFERENTIAL METHOD BLD: ABNORMAL
EOSINOPHIL # BLD: 0.1 K/UL (ref 0–0.4)
EOSINOPHIL NFR BLD: 1 % (ref 0–7)
ERYTHROCYTE [DISTWIDTH] IN BLOOD BY AUTOMATED COUNT: 12.2 % (ref 11.5–14.5)
GLOBULIN SER CALC-MCNC: 2.5 G/DL (ref 2–4)
GLUCOSE SERPL-MCNC: 115 MG/DL (ref 65–100)
HCT VFR BLD AUTO: 41.7 % (ref 35–47)
HGB BLD-MCNC: 14.1 G/DL (ref 11.5–16)
IMM GRANULOCYTES # BLD AUTO: 0 K/UL (ref 0–0.04)
IMM GRANULOCYTES NFR BLD AUTO: 1 % (ref 0–0.5)
INR PPP: 1 (ref 0.9–1.1)
LYMPHOCYTES # BLD: 1.5 K/UL (ref 0.8–3.5)
LYMPHOCYTES NFR BLD: 23 % (ref 12–49)
MCH RBC QN AUTO: 35.3 PG (ref 26–34)
MCHC RBC AUTO-ENTMCNC: 33.8 G/DL (ref 30–36.5)
MCV RBC AUTO: 104.3 FL (ref 80–99)
MONOCYTES # BLD: 0.5 K/UL (ref 0–1)
MONOCYTES NFR BLD: 8 % (ref 5–13)
NEUTS SEG # BLD: 4.2 K/UL (ref 1.8–8)
NEUTS SEG NFR BLD: 66 % (ref 32–75)
NRBC # BLD: 0 K/UL (ref 0–0.01)
NRBC BLD-RTO: 0 PER 100 WBC
PLATELET # BLD AUTO: 214 K/UL (ref 150–400)
PMV BLD AUTO: 10.1 FL (ref 8.9–12.9)
POTASSIUM SERPL-SCNC: 3.8 MMOL/L (ref 3.5–5.1)
PROT SERPL-MCNC: 6.4 G/DL (ref 6.4–8.2)
PROTHROMBIN TIME: 10.7 SEC (ref 9–11.1)
RBC # BLD AUTO: 4 M/UL (ref 3.8–5.2)
SODIUM SERPL-SCNC: 139 MMOL/L (ref 136–145)
SPECIMEN EXP DATE BLD: NORMAL
THERAPEUTIC RANGE,PTTT: NORMAL SECS (ref 58–77)
WBC # BLD AUTO: 6.3 K/UL (ref 3.6–11)

## 2021-01-13 PROCEDURE — 36415 COLL VENOUS BLD VENIPUNCTURE: CPT

## 2021-01-13 PROCEDURE — 80053 COMPREHEN METABOLIC PANEL: CPT

## 2021-01-13 PROCEDURE — 85730 THROMBOPLASTIN TIME PARTIAL: CPT

## 2021-01-13 PROCEDURE — 85025 COMPLETE CBC W/AUTO DIFF WBC: CPT

## 2021-01-13 PROCEDURE — 86901 BLOOD TYPING SEROLOGIC RH(D): CPT

## 2021-01-13 PROCEDURE — 93005 ELECTROCARDIOGRAM TRACING: CPT

## 2021-01-13 PROCEDURE — 85610 PROTHROMBIN TIME: CPT

## 2021-01-13 PROCEDURE — 87635 SARS-COV-2 COVID-19 AMP PRB: CPT

## 2021-01-13 RX ORDER — ATORVASTATIN CALCIUM 80 MG/1
80 TABLET, FILM COATED ORAL
COMMUNITY

## 2021-01-13 RX ORDER — PREDNISONE 5 MG/1
5 TABLET ORAL 2 TIMES DAILY
COMMUNITY

## 2021-01-13 NOTE — PERIOP NOTES
COVID TEST APPT CHANGED TO 1/13 AT 1340, PT KNOWS TO PROLONG QUARANTINE. DO NOT EAT OR DRINK ANYTHING AFTER MIDNIGHT, except as instructed THE NIGHT BEFORE SURGERY. PT GIVEN OPPORTUNITY TO ASK ADDITIONAL QUESTIONS. Patient given two bottles CHG soap and instruction sheet, instructions for use reviewed with patient. Patient given surgical site infection information FAQs handout and hand hygiene tips sheet. Pre-operative instructions reviewed and patient verbalizes understanding of instructions. Patient has been given the opportunity to ask additional questions. [facility] APPOINTMENTS REVIEWED AND GIVEN TO PATIENT. COVID-19 INSTRUCTION SHEET ALSO REVIEWED AND GIVEN TO PATIENT.

## 2021-01-14 LAB
ATRIAL RATE: 83 BPM
CALCULATED P AXIS, ECG09: 74 DEGREES
CALCULATED R AXIS, ECG10: 70 DEGREES
CALCULATED T AXIS, ECG11: 61 DEGREES
DIAGNOSIS, 93000: NORMAL
P-R INTERVAL, ECG05: 256 MS
Q-T INTERVAL, ECG07: 422 MS
QRS DURATION, ECG06: 78 MS
QTC CALCULATION (BEZET), ECG08: 448 MS
SARS-COV-2, COV2NT: NOT DETECTED
VENTRICULAR RATE, ECG03: 68 BPM

## 2021-01-18 ENCOUNTER — ANESTHESIA EVENT (OUTPATIENT)
Dept: SURGERY | Age: 76
DRG: 254 | End: 2021-01-18
Payer: MEDICARE

## 2021-01-18 ENCOUNTER — HOSPITAL ENCOUNTER (INPATIENT)
Age: 76
LOS: 2 days | Discharge: HOME | DRG: 254 | End: 2021-01-20
Payer: MEDICARE

## 2021-01-18 ENCOUNTER — ANESTHESIA (OUTPATIENT)
Dept: SURGERY | Age: 76
DRG: 254 | End: 2021-01-18
Payer: MEDICARE

## 2021-01-18 DIAGNOSIS — I70.235 ATHEROSCLEROSIS OF NATIVE ARTERY OF RIGHT LOWER EXTREMITY WITH ULCERATION OF OTHER PART OF FOOT (HCC): Primary | ICD-10-CM

## 2021-01-18 PROBLEM — I70.209 ATHEROSCLEROTIC PVD WITH ULCERATION (HCC): Status: ACTIVE | Noted: 2021-01-18

## 2021-01-18 PROBLEM — L98.499 ATHEROSCLEROTIC PVD WITH ULCERATION (HCC): Status: ACTIVE | Noted: 2021-01-18

## 2021-01-18 PROCEDURE — 77030002987 HC SUT PROL J&J -B

## 2021-01-18 PROCEDURE — 76010000171 HC OR TIME 2 TO 2.5 HR INTENSV-TIER 1

## 2021-01-18 PROCEDURE — 77030042556 HC PNCL CAUT -B

## 2021-01-18 PROCEDURE — 77030008462 HC STPLR SKN PROX J&J -A

## 2021-01-18 PROCEDURE — 74011000250 HC RX REV CODE- 250: Performed by: NURSE ANESTHETIST, CERTIFIED REGISTERED

## 2021-01-18 PROCEDURE — 94640 AIRWAY INHALATION TREATMENT: CPT

## 2021-01-18 PROCEDURE — 77030040922 HC BLNKT HYPOTHRM STRY -A

## 2021-01-18 PROCEDURE — 74011250636 HC RX REV CODE- 250/636

## 2021-01-18 PROCEDURE — 74011000250 HC RX REV CODE- 250

## 2021-01-18 PROCEDURE — 77030029684 HC NEB SM VOL KT MONA -A

## 2021-01-18 PROCEDURE — 77030008684 HC TU ET CUF COVD -B: Performed by: ANESTHESIOLOGY

## 2021-01-18 PROCEDURE — 74011000258 HC RX REV CODE- 258

## 2021-01-18 PROCEDURE — 77030026438 HC STYL ET INTUB CARD -A: Performed by: ANESTHESIOLOGY

## 2021-01-18 PROCEDURE — 76060000035 HC ANESTHESIA 2 TO 2.5 HR

## 2021-01-18 PROCEDURE — 65660000000 HC RM CCU STEPDOWN

## 2021-01-18 PROCEDURE — 94760 N-INVAS EAR/PLS OXIMETRY 1: CPT

## 2021-01-18 PROCEDURE — 74011250636 HC RX REV CODE- 250/636: Performed by: NURSE ANESTHETIST, CERTIFIED REGISTERED

## 2021-01-18 PROCEDURE — 74011636637 HC RX REV CODE- 636/637

## 2021-01-18 PROCEDURE — 74011250637 HC RX REV CODE- 250/637

## 2021-01-18 PROCEDURE — 74011250636 HC RX REV CODE- 250/636: Performed by: ANESTHESIOLOGY

## 2021-01-18 PROCEDURE — 77030002986 HC SUT PROL J&J -A

## 2021-01-18 PROCEDURE — 2709999900 HC NON-CHARGEABLE SUPPLY

## 2021-01-18 PROCEDURE — 041K0JL BYPASS RIGHT FEMORAL ARTERY TO POPLITEAL ARTERY WITH SYNTHETIC SUBSTITUTE, OPEN APPROACH: ICD-10-PCS

## 2021-01-18 PROCEDURE — 76210000016 HC OR PH I REC 1 TO 1.5 HR

## 2021-01-18 PROCEDURE — C1768 GRAFT, VASCULAR: HCPCS

## 2021-01-18 PROCEDURE — 77010033678 HC OXYGEN DAILY

## 2021-01-18 PROCEDURE — 77030031139 HC SUT VCRL2 J&J -A

## 2021-01-18 PROCEDURE — 77030040830 HC CATH URETH FOL MDII -A

## 2021-01-18 PROCEDURE — 77030002924 HC SUT GORTX WLGO -B

## 2021-01-18 DEVICE — GRAFT VASC L40CM ID6MM THN WALLED CBAS HEP SURF PROPATEN: Type: IMPLANTABLE DEVICE | Site: LEG | Status: FUNCTIONAL

## 2021-01-18 RX ORDER — MIDAZOLAM HYDROCHLORIDE 1 MG/ML
1 INJECTION, SOLUTION INTRAMUSCULAR; INTRAVENOUS AS NEEDED
Status: DISCONTINUED | OUTPATIENT
Start: 2021-01-18 | End: 2021-01-18 | Stop reason: HOSPADM

## 2021-01-18 RX ORDER — SODIUM CHLORIDE 0.9 % (FLUSH) 0.9 %
5-40 SYRINGE (ML) INJECTION EVERY 8 HOURS
Status: DISCONTINUED | OUTPATIENT
Start: 2021-01-18 | End: 2021-01-20 | Stop reason: HOSPADM

## 2021-01-18 RX ORDER — ONDANSETRON 2 MG/ML
4 INJECTION INTRAMUSCULAR; INTRAVENOUS AS NEEDED
Status: DISCONTINUED | OUTPATIENT
Start: 2021-01-18 | End: 2021-01-18 | Stop reason: HOSPADM

## 2021-01-18 RX ORDER — BUDESONIDE 0.25 MG/2ML
250 INHALANT ORAL
Status: DISCONTINUED | OUTPATIENT
Start: 2021-01-18 | End: 2021-01-20 | Stop reason: HOSPADM

## 2021-01-18 RX ORDER — PREDNISONE 10 MG/1
5 TABLET ORAL 2 TIMES DAILY
Status: DISCONTINUED | OUTPATIENT
Start: 2021-01-18 | End: 2021-01-20 | Stop reason: HOSPADM

## 2021-01-18 RX ORDER — GUAIFENESIN 100 MG/5ML
81 LIQUID (ML) ORAL DAILY
Status: DISCONTINUED | OUTPATIENT
Start: 2021-01-19 | End: 2021-01-20 | Stop reason: HOSPADM

## 2021-01-18 RX ORDER — QUETIAPINE FUMARATE 100 MG/1
100 TABLET, FILM COATED ORAL
Status: DISCONTINUED | OUTPATIENT
Start: 2021-01-18 | End: 2021-01-20 | Stop reason: HOSPADM

## 2021-01-18 RX ORDER — LIDOCAINE HYDROCHLORIDE 10 MG/ML
0.1 INJECTION, SOLUTION EPIDURAL; INFILTRATION; INTRACAUDAL; PERINEURAL AS NEEDED
Status: DISCONTINUED | OUTPATIENT
Start: 2021-01-18 | End: 2021-01-18 | Stop reason: HOSPADM

## 2021-01-18 RX ORDER — SODIUM CHLORIDE 0.9 % (FLUSH) 0.9 %
5-40 SYRINGE (ML) INJECTION AS NEEDED
Status: DISCONTINUED | OUTPATIENT
Start: 2021-01-18 | End: 2021-01-18 | Stop reason: HOSPADM

## 2021-01-18 RX ORDER — HYDROMORPHONE HYDROCHLORIDE 1 MG/ML
0.5 INJECTION, SOLUTION INTRAMUSCULAR; INTRAVENOUS; SUBCUTANEOUS
Status: DISCONTINUED | OUTPATIENT
Start: 2021-01-18 | End: 2021-01-20 | Stop reason: HOSPADM

## 2021-01-18 RX ORDER — SODIUM CHLORIDE, SODIUM LACTATE, POTASSIUM CHLORIDE, CALCIUM CHLORIDE 600; 310; 30; 20 MG/100ML; MG/100ML; MG/100ML; MG/100ML
25 INJECTION, SOLUTION INTRAVENOUS CONTINUOUS
Status: DISCONTINUED | OUTPATIENT
Start: 2021-01-18 | End: 2021-01-18 | Stop reason: HOSPADM

## 2021-01-18 RX ORDER — DEXAMETHASONE SODIUM PHOSPHATE 4 MG/ML
INJECTION, SOLUTION INTRA-ARTICULAR; INTRALESIONAL; INTRAMUSCULAR; INTRAVENOUS; SOFT TISSUE AS NEEDED
Status: DISCONTINUED | OUTPATIENT
Start: 2021-01-18 | End: 2021-01-18 | Stop reason: HOSPADM

## 2021-01-18 RX ORDER — ACETAMINOPHEN 325 MG/1
650 TABLET ORAL
Status: DISCONTINUED | OUTPATIENT
Start: 2021-01-18 | End: 2021-01-20 | Stop reason: HOSPADM

## 2021-01-18 RX ORDER — AMLODIPINE BESYLATE 5 MG/1
5 TABLET ORAL DAILY
Status: DISCONTINUED | OUTPATIENT
Start: 2021-01-19 | End: 2021-01-20 | Stop reason: HOSPADM

## 2021-01-18 RX ORDER — MIDAZOLAM HYDROCHLORIDE 1 MG/ML
0.5 INJECTION, SOLUTION INTRAMUSCULAR; INTRAVENOUS
Status: DISCONTINUED | OUTPATIENT
Start: 2021-01-18 | End: 2021-01-18 | Stop reason: HOSPADM

## 2021-01-18 RX ORDER — NEOSTIGMINE METHYLSULFATE 1 MG/ML
INJECTION INTRAVENOUS AS NEEDED
Status: DISCONTINUED | OUTPATIENT
Start: 2021-01-18 | End: 2021-01-18 | Stop reason: HOSPADM

## 2021-01-18 RX ORDER — SODIUM CHLORIDE 0.9 % (FLUSH) 0.9 %
5-40 SYRINGE (ML) INJECTION EVERY 8 HOURS
Status: DISCONTINUED | OUTPATIENT
Start: 2021-01-18 | End: 2021-01-18 | Stop reason: HOSPADM

## 2021-01-18 RX ORDER — ROCURONIUM BROMIDE 10 MG/ML
INJECTION, SOLUTION INTRAVENOUS AS NEEDED
Status: DISCONTINUED | OUTPATIENT
Start: 2021-01-18 | End: 2021-01-18 | Stop reason: HOSPADM

## 2021-01-18 RX ORDER — HYDROMORPHONE HYDROCHLORIDE 2 MG/ML
INJECTION, SOLUTION INTRAMUSCULAR; INTRAVENOUS; SUBCUTANEOUS AS NEEDED
Status: DISCONTINUED | OUTPATIENT
Start: 2021-01-18 | End: 2021-01-18 | Stop reason: HOSPADM

## 2021-01-18 RX ORDER — FENTANYL CITRATE 50 UG/ML
INJECTION, SOLUTION INTRAMUSCULAR; INTRAVENOUS AS NEEDED
Status: DISCONTINUED | OUTPATIENT
Start: 2021-01-18 | End: 2021-01-18 | Stop reason: HOSPADM

## 2021-01-18 RX ORDER — DIPHENHYDRAMINE HYDROCHLORIDE 50 MG/ML
12.5 INJECTION, SOLUTION INTRAMUSCULAR; INTRAVENOUS AS NEEDED
Status: DISCONTINUED | OUTPATIENT
Start: 2021-01-18 | End: 2021-01-18 | Stop reason: HOSPADM

## 2021-01-18 RX ORDER — FENTANYL CITRATE 50 UG/ML
25 INJECTION, SOLUTION INTRAMUSCULAR; INTRAVENOUS
Status: DISCONTINUED | OUTPATIENT
Start: 2021-01-18 | End: 2021-01-18 | Stop reason: HOSPADM

## 2021-01-18 RX ORDER — PROPOFOL 10 MG/ML
INJECTION, EMULSION INTRAVENOUS AS NEEDED
Status: DISCONTINUED | OUTPATIENT
Start: 2021-01-18 | End: 2021-01-18 | Stop reason: HOSPADM

## 2021-01-18 RX ORDER — SODIUM CHLORIDE 9 MG/ML
25 INJECTION, SOLUTION INTRAVENOUS CONTINUOUS
Status: DISCONTINUED | OUTPATIENT
Start: 2021-01-18 | End: 2021-01-18 | Stop reason: HOSPADM

## 2021-01-18 RX ORDER — ROPIVACAINE HYDROCHLORIDE 5 MG/ML
30 INJECTION, SOLUTION EPIDURAL; INFILTRATION; PERINEURAL AS NEEDED
Status: DISCONTINUED | OUTPATIENT
Start: 2021-01-18 | End: 2021-01-18 | Stop reason: HOSPADM

## 2021-01-18 RX ORDER — ENOXAPARIN SODIUM 100 MG/ML
30 INJECTION SUBCUTANEOUS EVERY 24 HOURS
Status: DISCONTINUED | OUTPATIENT
Start: 2021-01-18 | End: 2021-01-20 | Stop reason: HOSPADM

## 2021-01-18 RX ORDER — IBUPROFEN 600 MG/1
600 TABLET ORAL 3 TIMES DAILY
Status: DISCONTINUED | OUTPATIENT
Start: 2021-01-18 | End: 2021-01-20 | Stop reason: HOSPADM

## 2021-01-18 RX ORDER — GLYCOPYRROLATE 0.2 MG/ML
INJECTION INTRAMUSCULAR; INTRAVENOUS AS NEEDED
Status: DISCONTINUED | OUTPATIENT
Start: 2021-01-18 | End: 2021-01-18 | Stop reason: HOSPADM

## 2021-01-18 RX ORDER — MIDAZOLAM HYDROCHLORIDE 1 MG/ML
INJECTION, SOLUTION INTRAMUSCULAR; INTRAVENOUS AS NEEDED
Status: DISCONTINUED | OUTPATIENT
Start: 2021-01-18 | End: 2021-01-18 | Stop reason: HOSPADM

## 2021-01-18 RX ORDER — CLOPIDOGREL BISULFATE 75 MG/1
75 TABLET ORAL DAILY
Status: DISCONTINUED | OUTPATIENT
Start: 2021-01-19 | End: 2021-01-20 | Stop reason: HOSPADM

## 2021-01-18 RX ORDER — LIDOCAINE HYDROCHLORIDE 20 MG/ML
INJECTION, SOLUTION EPIDURAL; INFILTRATION; INTRACAUDAL; PERINEURAL AS NEEDED
Status: DISCONTINUED | OUTPATIENT
Start: 2021-01-18 | End: 2021-01-18 | Stop reason: HOSPADM

## 2021-01-18 RX ORDER — SODIUM CHLORIDE, SODIUM LACTATE, POTASSIUM CHLORIDE, CALCIUM CHLORIDE 600; 310; 30; 20 MG/100ML; MG/100ML; MG/100ML; MG/100ML
100 INJECTION, SOLUTION INTRAVENOUS CONTINUOUS
Status: DISCONTINUED | OUTPATIENT
Start: 2021-01-18 | End: 2021-01-18 | Stop reason: HOSPADM

## 2021-01-18 RX ORDER — ONDANSETRON 2 MG/ML
INJECTION INTRAMUSCULAR; INTRAVENOUS AS NEEDED
Status: DISCONTINUED | OUTPATIENT
Start: 2021-01-18 | End: 2021-01-18 | Stop reason: HOSPADM

## 2021-01-18 RX ORDER — DEXTROSE MONOHYDRATE AND SODIUM CHLORIDE 5; .45 G/100ML; G/100ML
75 INJECTION, SOLUTION INTRAVENOUS CONTINUOUS
Status: DISCONTINUED | OUTPATIENT
Start: 2021-01-18 | End: 2021-01-19

## 2021-01-18 RX ORDER — HYDROCODONE BITARTRATE AND ACETAMINOPHEN 7.5; 325 MG/1; MG/1
1 TABLET ORAL
Status: DISCONTINUED | OUTPATIENT
Start: 2021-01-18 | End: 2021-01-20 | Stop reason: HOSPADM

## 2021-01-18 RX ORDER — POTASSIUM CHLORIDE 750 MG/1
20 TABLET, FILM COATED, EXTENDED RELEASE ORAL DAILY
Status: DISCONTINUED | OUTPATIENT
Start: 2021-01-19 | End: 2021-01-20 | Stop reason: HOSPADM

## 2021-01-18 RX ORDER — ONDANSETRON 2 MG/ML
4 INJECTION INTRAMUSCULAR; INTRAVENOUS
Status: DISCONTINUED | OUTPATIENT
Start: 2021-01-18 | End: 2021-01-20 | Stop reason: HOSPADM

## 2021-01-18 RX ORDER — CLINDAMYCIN PHOSPHATE 600 MG/50ML
600 INJECTION INTRAVENOUS ONCE
Status: COMPLETED | OUTPATIENT
Start: 2021-01-18 | End: 2021-01-18

## 2021-01-18 RX ORDER — PROTAMINE SULFATE 10 MG/ML
INJECTION, SOLUTION INTRAVENOUS AS NEEDED
Status: DISCONTINUED | OUTPATIENT
Start: 2021-01-18 | End: 2021-01-18 | Stop reason: HOSPADM

## 2021-01-18 RX ORDER — FENTANYL CITRATE 50 UG/ML
50 INJECTION, SOLUTION INTRAMUSCULAR; INTRAVENOUS AS NEEDED
Status: DISCONTINUED | OUTPATIENT
Start: 2021-01-18 | End: 2021-01-18 | Stop reason: HOSPADM

## 2021-01-18 RX ORDER — SODIUM CHLORIDE 0.9 % (FLUSH) 0.9 %
5-40 SYRINGE (ML) INJECTION AS NEEDED
Status: DISCONTINUED | OUTPATIENT
Start: 2021-01-18 | End: 2021-01-20 | Stop reason: HOSPADM

## 2021-01-18 RX ORDER — HEPARIN SODIUM 1000 [USP'U]/ML
INJECTION, SOLUTION INTRAVENOUS; SUBCUTANEOUS AS NEEDED
Status: DISCONTINUED | OUTPATIENT
Start: 2021-01-18 | End: 2021-01-18 | Stop reason: HOSPADM

## 2021-01-18 RX ORDER — ACETAMINOPHEN 325 MG/1
650 TABLET ORAL ONCE
Status: DISCONTINUED | OUTPATIENT
Start: 2021-01-18 | End: 2021-01-18 | Stop reason: HOSPADM

## 2021-01-18 RX ORDER — HYDROMORPHONE HYDROCHLORIDE 1 MG/ML
0.2 INJECTION, SOLUTION INTRAMUSCULAR; INTRAVENOUS; SUBCUTANEOUS
Status: DISCONTINUED | OUTPATIENT
Start: 2021-01-18 | End: 2021-01-18 | Stop reason: HOSPADM

## 2021-01-18 RX ORDER — ATORVASTATIN CALCIUM 40 MG/1
80 TABLET, FILM COATED ORAL
Status: DISCONTINUED | OUTPATIENT
Start: 2021-01-18 | End: 2021-01-20 | Stop reason: HOSPADM

## 2021-01-18 RX ADMIN — DEXTROSE MONOHYDRATE AND SODIUM CHLORIDE 75 ML/HR: 5; .45 INJECTION, SOLUTION INTRAVENOUS at 10:41

## 2021-01-18 RX ADMIN — FENTANYL CITRATE 25 MCG: 50 INJECTION, SOLUTION INTRAMUSCULAR; INTRAVENOUS at 10:36

## 2021-01-18 RX ADMIN — SODIUM CHLORIDE, POTASSIUM CHLORIDE, SODIUM LACTATE AND CALCIUM CHLORIDE 25 ML/HR: 600; 310; 30; 20 INJECTION, SOLUTION INTRAVENOUS at 07:26

## 2021-01-18 RX ADMIN — HYDROCODONE BITARTRATE AND ACETAMINOPHEN 1 TABLET: 7.5; 325 TABLET ORAL at 21:08

## 2021-01-18 RX ADMIN — MIDAZOLAM 2 MG: 1 INJECTION INTRAMUSCULAR; INTRAVENOUS at 07:30

## 2021-01-18 RX ADMIN — DEXTROSE MONOHYDRATE AND SODIUM CHLORIDE 75 ML/HR: 5; .45 INJECTION, SOLUTION INTRAVENOUS at 22:16

## 2021-01-18 RX ADMIN — Medication 10 ML: at 16:16

## 2021-01-18 RX ADMIN — FENTANYL CITRATE 25 MCG: 50 INJECTION, SOLUTION INTRAMUSCULAR; INTRAVENOUS at 07:55

## 2021-01-18 RX ADMIN — CLINDAMYCIN PHOSPHATE 600 MG: 600 INJECTION, SOLUTION INTRAVENOUS at 07:42

## 2021-01-18 RX ADMIN — FENTANYL CITRATE 25 MCG: 50 INJECTION, SOLUTION INTRAMUSCULAR; INTRAVENOUS at 07:30

## 2021-01-18 RX ADMIN — PHENYLEPHRINE HYDROCHLORIDE 80 MCG/MIN: 10 INJECTION INTRAVENOUS at 08:03

## 2021-01-18 RX ADMIN — ONDANSETRON HYDROCHLORIDE 4 MG: 2 INJECTION, SOLUTION INTRAMUSCULAR; INTRAVENOUS at 09:24

## 2021-01-18 RX ADMIN — BUDESONIDE 250 MCG: 0.25 SUSPENSION RESPIRATORY (INHALATION) at 20:05

## 2021-01-18 RX ADMIN — ATORVASTATIN CALCIUM 80 MG: 40 TABLET, FILM COATED ORAL at 21:09

## 2021-01-18 RX ADMIN — PROPOFOL 30 MG: 10 INJECTION, EMULSION INTRAVENOUS at 08:24

## 2021-01-18 RX ADMIN — IBUPROFEN 600 MG: 600 TABLET, FILM COATED ORAL at 21:09

## 2021-01-18 RX ADMIN — HYDROMORPHONE HYDROCHLORIDE 0.5 MG: 1 INJECTION, SOLUTION INTRAMUSCULAR; INTRAVENOUS; SUBCUTANEOUS at 18:22

## 2021-01-18 RX ADMIN — PROPOFOL 100 MG: 10 INJECTION, EMULSION INTRAVENOUS at 07:36

## 2021-01-18 RX ADMIN — PROTAMINE SULFATE 25 MG: 10 INJECTION, SOLUTION INTRAVENOUS at 09:16

## 2021-01-18 RX ADMIN — HYDROCODONE BITARTRATE AND ACETAMINOPHEN 1 TABLET: 7.5; 325 TABLET ORAL at 13:30

## 2021-01-18 RX ADMIN — HYDROMORPHONE HYDROCHLORIDE 0.2 MG: 2 INJECTION, SOLUTION INTRAMUSCULAR; INTRAVENOUS; SUBCUTANEOUS at 08:25

## 2021-01-18 RX ADMIN — ROCURONIUM BROMIDE 20 MG: 10 SOLUTION INTRAVENOUS at 08:24

## 2021-01-18 RX ADMIN — PREDNISONE 5 MG: 10 TABLET ORAL at 21:09

## 2021-01-18 RX ADMIN — PROPOFOL 20 MG: 10 INJECTION, EMULSION INTRAVENOUS at 07:59

## 2021-01-18 RX ADMIN — ENOXAPARIN SODIUM 30 MG: 30 INJECTION SUBCUTANEOUS at 21:09

## 2021-01-18 RX ADMIN — FENTANYL CITRATE 25 MCG: 50 INJECTION, SOLUTION INTRAMUSCULAR; INTRAVENOUS at 07:59

## 2021-01-18 RX ADMIN — HYDROMORPHONE HYDROCHLORIDE 0.3 MG: 2 INJECTION, SOLUTION INTRAMUSCULAR; INTRAVENOUS; SUBCUTANEOUS at 09:39

## 2021-01-18 RX ADMIN — NEOSTIGMINE METHYLSULFATE 3 MG: 1 INJECTION, SOLUTION INTRAVENOUS at 09:36

## 2021-01-18 RX ADMIN — ROCURONIUM BROMIDE 10 MG: 10 SOLUTION INTRAVENOUS at 09:02

## 2021-01-18 RX ADMIN — LIDOCAINE HYDROCHLORIDE 60 MG: 20 INJECTION, SOLUTION EPIDURAL; INFILTRATION; INTRACAUDAL; PERINEURAL at 07:36

## 2021-01-18 RX ADMIN — FENTANYL CITRATE 25 MCG: 50 INJECTION, SOLUTION INTRAMUSCULAR; INTRAVENOUS at 10:20

## 2021-01-18 RX ADMIN — ROCURONIUM BROMIDE 30 MG: 10 SOLUTION INTRAVENOUS at 07:36

## 2021-01-18 RX ADMIN — HEPARIN SODIUM 3000 UNITS: 1000 INJECTION INTRAVENOUS; SUBCUTANEOUS at 08:29

## 2021-01-18 RX ADMIN — Medication 10 ML: at 21:12

## 2021-01-18 RX ADMIN — DEXAMETHASONE SODIUM PHOSPHATE 4 MG: 4 INJECTION, SOLUTION INTRAMUSCULAR; INTRAVENOUS at 07:45

## 2021-01-18 RX ADMIN — FENTANYL CITRATE 25 MCG: 50 INJECTION, SOLUTION INTRAMUSCULAR; INTRAVENOUS at 07:36

## 2021-01-18 RX ADMIN — FENTANYL CITRATE 25 MCG: 50 INJECTION, SOLUTION INTRAMUSCULAR; INTRAVENOUS at 10:32

## 2021-01-18 RX ADMIN — GLYCOPYRROLATE 0.4 MG: 0.2 INJECTION, SOLUTION INTRAMUSCULAR; INTRAVENOUS at 09:36

## 2021-01-18 NOTE — ANESTHESIA PREPROCEDURE EVALUATION
Relevant Problems   No relevant active problems       Anesthetic History   No history of anesthetic complications            Review of Systems / Medical History  Patient summary reviewed, nursing notes reviewed and pertinent labs reviewed    Pulmonary    COPD      Smoker  Asthma        Neuro/Psych     seizures    TIA and psychiatric history     Cardiovascular    Hypertension          Pacemaker (SSS) and PAD    Exercise tolerance: <4 METS     GI/Hepatic/Renal     GERD           Endo/Other        Arthritis     Other Findings              Physical Exam    Airway  Mallampati: II  TM Distance: 4 - 6 cm  Neck ROM: normal range of motion   Mouth opening: Normal     Cardiovascular    Rhythm: regular  Rate: normal         Dental    Dentition: Full upper dentures and Lower dentition intact     Pulmonary  Breath sounds clear to auscultation               Abdominal  GI exam deferred       Other Findings            Anesthetic Plan    ASA: 3  Anesthesia type: general          Induction: Intravenous  Anesthetic plan and risks discussed with: Patient

## 2021-01-18 NOTE — PERIOP NOTES
FIBRILLAR WAS GIVEN TO THE STERILE FIELD TO BE USED BY MD DURING PROCEDURE  REF: [id number]  LOT: [id number]  EXP: 09/30/2022

## 2021-01-18 NOTE — BRIEF OP NOTE
Brief Postoperative Note    Patient: Xochitl Kendall  YOB: 1945  MRN: [id number]    Date of Procedure: 1/18/2021     Pre-Op Diagnosis: ATHEROSCLEROSIS WITH ULCERATION    Post-Op Diagnosis: Same as preoperative diagnosis. Procedure(s):  REDO RIGHT FEMORAL-POPLITEAL BYPASS WITH PTFE    Surgeon(s):  Shayy Robles MD    Surgical Assistant: Surg Asst-1: Jacoby Camacho    Anesthesia: General     Estimated Blood Loss (mL): 726 ml    Complications: None    Specimens: * No specimens in log *     Implants:   Implant Name Type Inv.  Item Serial No.  Lot No. LRB No. Used Action   GRAFT VASC L40CM ID6MM THN WALLED CBAS HEP SURF PROPATEN - [id number]  GRAFT VASC L40CM ID6MM THN WALLED CBAS HEP SURF PROPATEN [id number]  [employer]WD NA Right 1 Implanted       Drains: * No LDAs found *    Findings: none    Electronically Signed by Niall Younger MD on 1/18/2021 at 9:44 AM

## 2021-01-18 NOTE — ANESTHESIA POSTPROCEDURE EVALUATION
Post-Anesthesia Evaluation and Assessment    Patient: Dalila Murillo MRN: [id number]  SSN: [id number]    YOB: 1945  Age: 77 y.o. Sex: female      I have evaluated the patient and they are stable and ready for discharge from the PACU. Cardiovascular Function/Vital Signs  Visit Vitals  /67   Pulse 83   Temp 36.8 °C (98.3 °F)   Resp 22   Ht 5' (1.524 m)   Wt 41 kg (90 lb 6.2 oz)   SpO2 92%   BMI 17.65 kg/m²       Patient is status post General anesthesia for Procedure(s):  REDO RIGHT FEMORAL-POPLITEAL BYPASS WITH PTFE. Nausea/Vomiting: None    Postoperative hydration reviewed and adequate. Pain:  Pain Scale 1: Numeric (0 - 10) (01/18/21 1023)  Pain Intensity 1: 6 (01/18/21 1023)   Managed    Neurological Status:   Neuro (WDL): Within Defined Limits (01/18/21 1023)   At baseline    Mental Status, Level of Consciousness: Alert and  oriented to person, place, and time    Pulmonary Status:   O2 Device: Room air (01/18/21 1023)   Adequate oxygenation and airway patent    Complications related to anesthesia: None    Post-anesthesia assessment completed. No concerns    Signed By: Rona Ibarra MD     January 18, 2021              Procedure(s):  REDO RIGHT FEMORAL-POPLITEAL BYPASS WITH PTFE. general    <BSHSIANPOST>    INITIAL Post-op Vital signs:   Vitals Value Taken Time   /60 01/18/21 1030   Temp 36.8 °C (98.3 °F) 01/18/21 0952   Pulse 82 01/18/21 1032   Resp 19 01/18/21 1032   SpO2 95 % 01/18/21 1032   Vitals shown include unvalidated device data.

## 2021-01-18 NOTE — OP NOTES
[address] [place]   OPERATIVE REPORT    Name:  Homa Rodriguez  MR#:  [id number]  :  1945  ACCOUNT #:  [de-identified]  DATE OF SERVICE:  2021    PREOPERATIVE DIAGNOSIS:  Peripheral vascular disease with ulceration. POSTOPERATIVE DIAGNOSIS:  Peripheral vascular disease with ulceration. PROCEDURE PERFORMED:  Redo right femoral to above-knee popliteal bypass using PTFE. SURGEON:  Eduarda Dickinson MD    ASSISTANT:  Keri Maria SA    ANESTHESIA:  General.    COMPLICATIONS:  None. SPECIMENS REMOVED:  None. IMPLANTS:  6-mm PTFE graft. ESTIMATED BLOOD LOSS:  150 mL. INDICATIONS:  The patient is a 80-year-old female, who has previously undergone a right femoral above-knee popliteal bypass with a prosthetic graft in 2017. She recently presented to the office with a nonhealing wound on the right second toe and was found to have an occluded graft. Arteriography shows reconstitution of the popliteal artery above the knee. She will undergo revascularization. PROCEDURE:  The patient's right leg was prepped and draped. A transverse incision was made in the region of the inguinal crease. The previously placed graft was identified and dissected free down to the common femoral artery. The deep femoral artery was dissected free and the common femoral dissected proximal to the takeoff of the previous graft. A longitudinal incision was then made in the distal medial thigh. The old graft was identified and dissection continued along the graft down to the popliteal artery. The old graft was divided and the popliteal artery was dissected free proximal and distal to the old graft. On arteriogram, the popliteal artery reconstituted just above the knee joint. The artery was dissected to this level. A subfascial tunnel was created using one counterincision in the mid thigh. The patient was heparinized.   A 6-mm thin-walled [place] graft was obtained and was sewn end-to-side to the common femoral artery. This was done using running CV5 [place]-Jefferson suture. The graft was then tunneled subfascially and sewn end-to-side to the divided previous graft onto the popliteal artery using running CV5 Wh[place]-Jefferson suture. This was done end-to-side as well. Following this, there was a good pulse in the bypass and good flow by Doppler in the outflow artery. Hemostasis was obtained, and the incisions were closed with Vicryl subcutaneous and fascial suture and skin staples. Dressings were applied and the patient was returned to the recovery room in stable addition.       Nickolas Shankar MD      [id number]  D:  01/18/2021 9:56  T:  01/18/2021 10:20  JOB #:  [id number]

## 2021-01-18 NOTE — ROUTINE PROCESS
Patient: David Najera MRN: [id number]  SSN: [id number]   YOB: 1945  Age: 77 y.o. Sex: female     Patient is status post Procedure(s):  REDO RIGHT FEMORAL-POPLITEAL BYPASS WITH PTFE. Surgeon(s) and Role:     * Saeid Lopez MD - Primary    Local/Dose/Irrigation:                    Peripheral IV 01/18/21 Anterior;Proximal;Right Forearm (Active)   Site Assessment Clean, dry, & intact 01/18/21 0725   Phlebitis Assessment 0 01/18/21 0725   Infiltration Assessment 0 01/18/21 0725   Dressing Status Clean, dry, & intact; New 01/18/21 0725   Dressing Type Tape;Transparent 01/18/21 0725   Hub Color/Line Status Pink; Infusing 01/18/21 0725            Airway - Endotracheal Tube 01/18/21 Oral (Active)                   Dressing/Packing:  Incision 01/18/21 Leg Right-Dressing/Treatment: Gauze dressing/dressing sponge;Tape/Soft cloth adhesive tape (01/18/21 0900)    Splint/Cast:  ]    Other:

## 2021-01-18 NOTE — PROGRESS NOTES
1500 - Reason for Admission: Atherosclerosis with ulceration                   RUR Score:   5 %               DME: Patient has walker at home. No other [facility] services in the home. ADLs: Patient is primarily independent but uses a walker. Previous HH/SNF/rehab: UNK    ER Contacts:Musa Parra - son  - [phone]     Plan for utilizing home health:  Not at this time. PCP: First and Last name:  Dr. Ck Waller   Name of Practice:    Are you a current patient: Yes/No: Yes   Approximate date of last visit: 3 years ago. Sees specialists  For hospital followups. Can you participate in a virtual visit with your PCP: Maybe if grand children are around to help. Current Advanced Directive/Advance Care Plan: No                         Transition of Care Plan:   Transport home with family. Care Management Interventions  PCP Verified by CM: Yes  Mode of Transport at Discharge:  Other (see comment)  MyChart Signup: No  Discharge Durable Medical Equipment: No  Health Maintenance Reviewed: Yes  Physical Therapy Consult: No  Occupational Therapy Consult: No  Speech Therapy Consult: No  Current Support Network: Lives Alone  Confirm Follow Up Transport: Family  Discharge Location  Discharge Placement: Home

## 2021-01-18 NOTE — PERIOP NOTES
TRANSFER - OUT REPORT:    Verbal report given to Archana(name) on Gordo Fritz  being transferred to [facility](unit) for routine post - op       Report consisted of patients Situation, Background, Assessment and  Recommendations(SBAR). Time Pre op antibiotic given:0742  Anesthesia Stop time: 5707  Rudd Present on Transfer to floor:yes  Order for Rudd on Chart:yes  Discharge Prescriptions with Chart:no    Information from the following report(s) SBAR, Kardex, OR Summary, Procedure Summary, Intake/Output, MAR, Recent Results and Med Rec Status was reviewed with the receiving nurse. Opportunity for questions and clarification was provided. Is the patient on 02? YES       L/Min 2       Other     Is the patient on a monitor? NO    Is the nurse transporting with the patient? NO    Surgical Waiting Area notified of patient's transfer from PACU? YES      The following personal items collected during your admission accompanied patient upon transfer:   Dental Appliance: Dental Appliances: Uppers  Vision:    Hearing Aid:    Jewelry: Jewelry: None  Clothing: Clothing: (clothing to PACU)  Other Valuables:    Valuables sent to safe:        Dentures, cane and 3 bags of clothes sent to floor.

## 2021-01-19 PROCEDURE — 74011000250 HC RX REV CODE- 250

## 2021-01-19 PROCEDURE — 97161 PT EVAL LOW COMPLEX 20 MIN: CPT

## 2021-01-19 PROCEDURE — 97116 GAIT TRAINING THERAPY: CPT

## 2021-01-19 PROCEDURE — 77030041076 HC DRSG AG OPTICELL MDII -A

## 2021-01-19 PROCEDURE — 77010033678 HC OXYGEN DAILY

## 2021-01-19 PROCEDURE — 77030038269 HC DRN EXT URIN PURWCK BARD -A

## 2021-01-19 PROCEDURE — 65660000000 HC RM CCU STEPDOWN

## 2021-01-19 PROCEDURE — 94760 N-INVAS EAR/PLS OXIMETRY 1: CPT

## 2021-01-19 PROCEDURE — 74011636637 HC RX REV CODE- 636/637

## 2021-01-19 PROCEDURE — 94640 AIRWAY INHALATION TREATMENT: CPT

## 2021-01-19 PROCEDURE — 74011250637 HC RX REV CODE- 250/637

## 2021-01-19 PROCEDURE — 74011250636 HC RX REV CODE- 250/636

## 2021-01-19 RX ADMIN — HYDROMORPHONE HYDROCHLORIDE 0.5 MG: 1 INJECTION, SOLUTION INTRAMUSCULAR; INTRAVENOUS; SUBCUTANEOUS at 20:11

## 2021-01-19 RX ADMIN — ATORVASTATIN CALCIUM 80 MG: 40 TABLET, FILM COATED ORAL at 21:57

## 2021-01-19 RX ADMIN — QUETIAPINE FUMARATE 100 MG: 100 TABLET ORAL at 21:56

## 2021-01-19 RX ADMIN — BUDESONIDE 250 MCG: 0.25 SUSPENSION RESPIRATORY (INHALATION) at 07:24

## 2021-01-19 RX ADMIN — Medication 10 ML: at 14:44

## 2021-01-19 RX ADMIN — Medication 10 ML: at 21:57

## 2021-01-19 RX ADMIN — ASPIRIN 81 MG: 81 TABLET, CHEWABLE ORAL at 08:56

## 2021-01-19 RX ADMIN — HYDROMORPHONE HYDROCHLORIDE 0.5 MG: 1 INJECTION, SOLUTION INTRAMUSCULAR; INTRAVENOUS; SUBCUTANEOUS at 00:00

## 2021-01-19 RX ADMIN — POTASSIUM CHLORIDE 20 MEQ: 750 TABLET, FILM COATED, EXTENDED RELEASE ORAL at 08:56

## 2021-01-19 RX ADMIN — CLOPIDOGREL BISULFATE 75 MG: 75 TABLET ORAL at 08:56

## 2021-01-19 RX ADMIN — AMLODIPINE BESYLATE 5 MG: 5 TABLET ORAL at 08:56

## 2021-01-19 RX ADMIN — PREDNISONE 5 MG: 10 TABLET ORAL at 08:56

## 2021-01-19 RX ADMIN — HYDROMORPHONE HYDROCHLORIDE 0.5 MG: 1 INJECTION, SOLUTION INTRAMUSCULAR; INTRAVENOUS; SUBCUTANEOUS at 16:54

## 2021-01-19 RX ADMIN — HYDROMORPHONE HYDROCHLORIDE 0.5 MG: 1 INJECTION, SOLUTION INTRAMUSCULAR; INTRAVENOUS; SUBCUTANEOUS at 23:29

## 2021-01-19 RX ADMIN — ENOXAPARIN SODIUM 30 MG: 30 INJECTION SUBCUTANEOUS at 21:56

## 2021-01-19 RX ADMIN — HYDROMORPHONE HYDROCHLORIDE 0.5 MG: 1 INJECTION, SOLUTION INTRAMUSCULAR; INTRAVENOUS; SUBCUTANEOUS at 03:37

## 2021-01-19 RX ADMIN — HYDROMORPHONE HYDROCHLORIDE 0.5 MG: 1 INJECTION, SOLUTION INTRAMUSCULAR; INTRAVENOUS; SUBCUTANEOUS at 12:30

## 2021-01-19 RX ADMIN — BUDESONIDE 250 MCG: 0.25 SUSPENSION RESPIRATORY (INHALATION) at 20:29

## 2021-01-19 RX ADMIN — PREDNISONE 5 MG: 10 TABLET ORAL at 21:56

## 2021-01-19 RX ADMIN — HYDROMORPHONE HYDROCHLORIDE 0.5 MG: 1 INJECTION, SOLUTION INTRAMUSCULAR; INTRAVENOUS; SUBCUTANEOUS at 08:56

## 2021-01-19 RX ADMIN — QUETIAPINE FUMARATE 100 MG: 100 TABLET ORAL at 03:48

## 2021-01-19 NOTE — PROGRESS NOTES
Vascular:    complains of foot pain related to second toe ulcer, also incisional pain with movement    Leg dressings intact, foot warm with palpable PT pulse    Second toe ulcer small and clean    Plan to increase activity, wound care to see for toe ulcer, RICK Rudd    Home when more comfortable and ambulatory

## 2021-01-19 NOTE — PROGRESS NOTES
0745- Bedside shift change report given to WILLY Ortega (oncoming nurse), RN by Isis Mitchell RN. Report included the following information SBAR, Kardex, Intake/Output, MAR and Cardiac Rhythm NSR.   0800- Shift assessment completed- see flowsheets. 5515- PRN dilaudid given for 10/10 right leg pain. 1000- Pt tolerated walking with PT, currently up in chair. PT/patient noticed pt's gold cane is missing. PCT calling other units pt came from to try to find cane. 1200- Shift reassessment completed- see flowsheets. 1230- PRN dilaudid given for 8/10 right leg pain. 1530- New bed put in room, old bed was constantly beeping. 1540- Bedside and Verbal shift change report given to Rafita Hernandez RN (oncoming nurse) by Bernadette Mcdonnell RN (offgoing nurse). Report included the following information SBAR, Kardex, MAR, Recent Results, Cardiac Rhythm Paced and Alarm Parameters .

## 2021-01-19 NOTE — PROGRESS NOTES
Bedside and Verbal shift change report given to Shannon (oncoming nurse) by Mc Ku (offgoing nurse). Report included the following information SBAR, Kardex, MAR and Accordion.

## 2021-01-19 NOTE — PROGRESS NOTES
Problem: Mobility Impaired (Adult and Pediatric)  Goal: *Acute Goals and Plan of Care (Insert Text)  Description: FUNCTIONAL STATUS PRIOR TO ADMISSION: Patient was modified independent using a single point cane for functional mobility. When asked she reports having had 4-5 falls in the last year. Of noted, pt reports that her cane had been here in her room but I could not find it and alerted her nurse. HOME SUPPORT PRIOR TO ADMISSION: The patient lived alone with family as local support but did not require any assist.. Physical Therapy Goals  Initiated 1/19/2021  1. Patient will move from supine to sit and sit to supine , scoot up and down, and roll side to side in bed with independence within 7 day(s). 2.  Patient will transfer from bed to chair and chair to bed with modified independence using the least restrictive device within 7 day(s). 3.  Patient will perform sit to stand with modified independence within 7 day(s). 4.  Patient will ambulate with modified independence for 200 feet with the least restrictive device within 7 day(s). No stairs goal, pt reports that her home has no stairs. Outcome: Progressing Towards Goal   PHYSICAL THERAPY EVALUATION  Patient: Daniel Morales (85 y.o. female)  Date: 1/19/2021  Primary Diagnosis: Atherosclerotic PVD with ulceration (HCC) [I70.209, L98.499]  Procedure(s) (LRB):  REDO RIGHT FEMORAL-POPLITEAL BYPASS WITH PTFE (Right) 1 Day Post-Op   Precautions:   Fall, Skin, WBAT    ASSESSMENT  Based on the objective data described below, the patient presents with good mobility POD 1 from a right redo fem-pop bypass. Pt noted to take her time which was appropriately cautious. She was able to amb a household distance. Utilized a small base quad cane for the eval. Pt's own single point cane not in her room (discussed with her nurse). Anticipate steady gains and discharge to home. PT will keep pt on the log, likely see only once more. .    Current Level of Function Impacting Discharge (mobility/balance): while I did provide contact guard, no physical assist was provided. Functional Outcome Measure: The patient scored 21/28 on the Tinetti outcome measure which is indicative of moderate fall risk but she did loose points for using her hands for stand <>sit and use of an assistive device and she uses a cane at baseline, both good strategies for this lady. .      Other factors to consider for discharge: lives alone      Vitals:         01/19/21 0942 01/19/21 1002   BP:     (!) 128/58 (!) 128/91   BP 1 Location:     Right arm Right arm   BP Patient Position:     Supine; At rest Sitting;Post activity   Pulse:     86 (!) 104   Resp:           Temp:           SpO2: on room air     95% 94%                        Patient will benefit from skilled therapy intervention to address the above noted impairments. PLAN :  Recommendations and Planned Interventions: bed mobility training, transfer training, gait training, therapeutic exercises, patient and family training/education, and therapeutic activities      Frequency/Duration: Patient will be followed by physical therapy:  3 times a week to address goals. Recommendation for discharge: (in order for the patient to meet his/her long term goals)  No skilled physical therapy/ follow up rehabilitation needs identified at this time. This discharge recommendation:  A follow-up discussion with the attending provider and/or case management is planned    IF patient discharges home will need the following DME: patient owns DME required for discharge         SUBJECTIVE:   Patient stated that she takes her time because she does not want to overdo it. She reports doing this at home. She also reports two months on right foot pain. OBJECTIVE DATA SUMMARY:   Consult received, chart reviewed, pt cleared by nursing.   HISTORY:    Past Medical History:   Diagnosis Date    Arthritis     Arthritis     Asthma     Chronic [facility] pulmonary disease ([facility]     Depression     GERD (gastroesophageal reflux disease)     Heart murmur     benign    Hypertension     not on medication    Ill-defined condition     cerebral atherosclerosis    Ill-defined condition     pancreatitis    Ill-defined condition     polyneuropathy    Ill-defined condition     chronic opiod use    Lung disease     Seizures ([facility] [address] [facility]     2016 last seizure    Stroke ([facility] [address] [facility]     tia    Thromboembolus ([facility] [address]     right leg     Past Surgical History:   Procedure Laterality Date    HX APPENDECTOMY  1972    HX COLONOSCOPY      HX HAMMER TOE REPAIR Right 7/302017    HX MOHS PROCEDURES Left 2009    HX ORTHOPAEDIC  2006    back    HX OTHER SURGICAL      Removal of blood clot left side of neck    HX OTHER SURGICAL      Neck    HX ROTATOR CUFF REPAIR Right     8/2018    HX TUBAL LIGATION  1972    NEUROLOGICAL PROCEDURE UNLISTED      NECK    [place] BREAST SURGERY PROCEDURE UNLISTED      left breast lumpectomy    [place] BYPASS GRAFT OTHR,FEM-POP      [place] INS NEW/RPLCMT PRM PM W/TRANSV ELTRD ATRIAL&VENT N/A 5/8/2020    INSERT PPM DUAL performed by Cara Church MD at [facility] CARDIAC CATH LAB    VASCULAR SURGERY PROCEDURE UNLIST      right carotid endarterectomy       Personal factors and/or comorbidities impacting plan of care: lives alone    Home Situation  Home Environment: Apartment  # Steps to Enter: 0  One/Two Story Residence: One story  Living Alone: Yes  Support Systems: Family member(s)  Patient Expects to be Discharged to[de-identified] Apartment  Current DME Used/Available at Home: Cane, straight, Walker, rolling    EXAMINATION/PRESENTATION/DECISION MAKING:   Critical Behavior:  Neurologic State: Alert  Orientation Level: Oriented X4        Hearing:     Skin:  refer to MD and nursing notes, post op bandage on her right leg and foot not observed given how hypersensitive it is, did not remove walking sock.   Edema: none noted  Range Of Motion:  AROM: Generally decreased, functional Strength:    Strength: Generally decreased, functional                    Tone & Sensation:                  Sensation: (at baseline RLE she reports)               Coordination:     Vision:      Functional Mobility:  Bed Mobility:     Supine to Sit: Supervision;Bed Modified        Transfers:  Sit to Stand: Contact guard assistance  Stand to Sit: Contact guard assistance                       Balance:   Sitting: Intact; Without support  Standing: Impaired  Standing - Static: Constant support;Good  Standing - Dynamic : Constant support;Good  Ambulation/Gait Training:  Distance (ft): 50 Feet (ft)  Assistive Device: Gait belt;Cane, quad  Ambulation - Level of Assistance: Contact guard assistance        Gait Abnormalities: Antalgic;Decreased step clearance  Right Side Weight Bearing: As tolerated     Base of Support: Narrowed  Stance: Right decreased  Speed/Dixie: Slow  Step Length: Left shortened;Right shortened                     Stairs: Therapeutic Exercises:   Instructed her in ankle pumps    Functional Measure:  Tinetti test:    Sitting Balance: 1  Arises: 1  Attempts to Rise: 2  Immediate Standing Balance: 1  Standing Balance: 1  Nudged: 2  Eyes Closed: 1  Turn 360 Degrees - Continuous/Discontinuous: 1  Turn 360 Degrees - Steady/Unsteady: 1  Sitting Down: 1  Balance Score: 12 Balance total score  Indication of Gait: 1  R Step Length/Height: 1  L Step Length/Height: 1  R Foot Clearance: 1  L Foot Clearance: 1  Step Symmetry: 1  Step Continuity: 1  Path: 1  Trunk: 0  Walking Time: 1  Gait Score: 9 Gait total score  Total Score: 21/28 Overall total score         Tinetti Tool Score Risk of Falls  <19 = High Fall Risk  19-24 = Moderate Fall Risk  25-28 = Low Fall Risk  Tinetti ME. Performance-Oriented Assessment of Mobility Problems in Elderly Patients. [address]; [id number].  (Scoring Description: PT Bulletin Feb. 10, 1993)    Older adults: Hiren Lemons et al, 2009; n = [address] elderly evaluated with ABC, MERRITT, ADL, and IADL)  · Mean MERRITT score for males aged 69-68 years = 26.21(3.40)  · Mean MERRITT score for females age 69-68 years = 25.16(4.30)  · Mean MERRITT score for males over 80 years = 23.29(6.02)  · Mean MERRITT score for females over 80 years = 17.20(8.32)           Physical Therapy Evaluation Charge Determination   History Examination Presentation Decision-Making   MEDIUM  Complexity : 1-2 comorbidities / personal factors will impact the outcome/ POC  MEDIUM Complexity : 3 Standardized tests and measures addressing body structure, function, activity limitation and / or participation in recreation  LOW Complexity : Stable, uncomplicated  LOW Complexity : FOTO score of       Based on the above components, the patient evaluation is determined to be of the following complexity level: LOW     Pain Rating:  10/10 right foot, stated in matter of fact manner    Activity Tolerance:   Good and SpO2 stable on RA    After treatment patient left in no apparent distress:   Sitting in chair, Call bell within reach, and LEs elevated    COMMUNICATION/EDUCATION:   The patients plan of care was discussed with: Registered nurse. Fall prevention education was provided and the patient/caregiver indicated understanding., Patient/family have participated as able in goal setting and plan of care. , and Patient/family agree to work toward stated goals and plan of care.     Thank you for this referral.  Nicole Langley   Time Calculation: 38 mins

## 2021-01-19 NOTE — PROGRESS NOTES
Bedside shift change report given to Patrick Lassiter RN by [facility] RN. Report included the following information SBAR, Kardex, Intake/Output, MAR and Cardiac Rhythm NSR.

## 2021-01-19 NOTE — WOUND CARE
WOCN Note:     New consult for toe wound. Chart shows:  Admitted for right fem-pop by Dr. Hima Gonzalse:   Appropriately conversational and sitting up in bed having lunch. Tenderness in right toes. Bilateral heels intact with no redness. 1. POA, right inner great toe and medial 2nd toe ulcerations, chronic and each are = 0.5 x 0.5 x 0.2 cm  Base is drying red. No exudate. No edema or redness to foot or toes. Cleaned with saline, applied Opticel AG and secured with tape. Wound Recommendations:    Right toes: clean with saline, apply piece of Opticel AG and secure with tape. Change every other day.      Transition of Care: Plan to follow weekly and as needed while admitted to hospital.      MASSIEL ChaconN, RN, [facility]  Certified Wound, Ostomy, Continence Nurse  office [phone]  pager [phone] or call  to page

## 2021-01-19 NOTE — PROGRESS NOTES
1/19/2021 -   SUE:  - RUR: 6%  - Potential disposition is for discharge to own home with transport via family  - Patient received IM Letter 1/18    - Patient is POD#1 from right fem-pop  - Wound Care consult pending  - Patient has been cleared by PT with no additional transition of care recommendations  - Rudd to be taken out today  - Patient to increase time OOB and ambulation  CRM: Dahiana Ortiz, MPH, [address] [place] [address]; Z: [phone]

## 2021-01-19 NOTE — PROGRESS NOTES
Orders received, chart reviewed and patient evaluated by physical therapy. Pending progression with skilled acute physical therapy, recommend:  No skilled physical therapy/ follow up rehabilitation needs identified at this time. Recommend with nursing patient to complete as able in order to maintain strength, endurance and independence: OOB to chair 3x/day with assist x 1 and ambulating with assist x 1 using a cane. Thank you for your assistance. Full evaluation to follow. Cristin Yepez, PT    Vitals:      01/19/21 0942 01/19/21 1002   BP:   (!) 128/58 (!) 128/91   BP 1 Location:   Right arm Right arm   BP Patient Position:   Supine; At rest Sitting;Post activity   Pulse:   86 (!) 104   Resp:       Temp:       SpO2: on room air   95% 94%

## 2021-01-20 VITALS
DIASTOLIC BLOOD PRESSURE: 75 MMHG | TEMPERATURE: 98.1 F | OXYGEN SATURATION: 98 % | WEIGHT: 96.12 LBS | RESPIRATION RATE: 15 BRPM | BODY MASS INDEX: 18.87 KG/M2 | HEIGHT: 60 IN | HEART RATE: 80 BPM | SYSTOLIC BLOOD PRESSURE: 129 MMHG

## 2021-01-20 PROCEDURE — 74011250636 HC RX REV CODE- 250/636

## 2021-01-20 PROCEDURE — 74011250637 HC RX REV CODE- 250/637

## 2021-01-20 PROCEDURE — 74011636637 HC RX REV CODE- 636/637

## 2021-01-20 RX ORDER — HYDROCODONE BITARTRATE AND ACETAMINOPHEN 7.5; 325 MG/1; MG/1
1 TABLET ORAL
Qty: 40 TAB | Refills: 0 | Status: SHIPPED | OUTPATIENT
Start: 2021-01-20 | End: 2021-01-27

## 2021-01-20 RX ADMIN — POTASSIUM CHLORIDE 20 MEQ: 750 TABLET, FILM COATED, EXTENDED RELEASE ORAL at 08:33

## 2021-01-20 RX ADMIN — AMLODIPINE BESYLATE 5 MG: 5 TABLET ORAL at 08:33

## 2021-01-20 RX ADMIN — CLOPIDOGREL BISULFATE 75 MG: 75 TABLET ORAL at 08:33

## 2021-01-20 RX ADMIN — HYDROMORPHONE HYDROCHLORIDE 0.5 MG: 1 INJECTION, SOLUTION INTRAMUSCULAR; INTRAVENOUS; SUBCUTANEOUS at 04:08

## 2021-01-20 RX ADMIN — ASPIRIN 81 MG: 81 TABLET, CHEWABLE ORAL at 08:33

## 2021-01-20 RX ADMIN — HYDROMORPHONE HYDROCHLORIDE 0.5 MG: 1 INJECTION, SOLUTION INTRAMUSCULAR; INTRAVENOUS; SUBCUTANEOUS at 08:47

## 2021-01-20 RX ADMIN — PREDNISONE 5 MG: 10 TABLET ORAL at 08:33

## 2021-01-20 NOTE — PROGRESS NOTES
0730 -  Assumed care of patient. Patient awake, alert, oriented, resting comfortably in bed. Call bell within reach, denies further needs. 0800 - Discharge orders in. Patient's ride unable to pick her up until this afternoon. Will continue to monitor. 1300 - Patient discharged. Paperwork given. Discussed medications, prescription , and confirmed patient's comfort with dressing changes and when to come back to the hospital if needed. PIVs removed. Patient confirms she has all belongings. Transport took her down to main entrance where her neighbor picked her up to take her home. Problem: Falls - Risk of  Goal: *Absence of Falls  Description: Document Diogenes Toy Fall Risk and appropriate interventions in the flowsheet.   Outcome: Progressing Towards Goal  Note: Fall Risk Interventions:  Mobility Interventions: Bed/chair exit alarm         Medication Interventions: Bed/chair exit alarm    Elimination Interventions: Bed/chair exit alarm

## 2021-01-20 NOTE — PROGRESS NOTES
Problem: Falls - Risk of  Goal: *Absence of Falls  Description: Document Margo Grimm Fall Risk and appropriate interventions in the flowsheet. Outcome: Progressing Towards Goal  Note: Fall Risk Interventions:  Mobility Interventions: Communicate number of staff needed for ambulation/transfer, Patient to call before getting OOB, Utilize walker, cane, or other assistive device         Medication Interventions: Patient to call before getting OOB, Teach patient to arise slowly    Elimination Interventions: Call light in reach, Patient to call for help with toileting needs              Problem: Pressure Injury - Risk of  Goal: *Prevention of pressure injury  Description: Document Obey Scale and appropriate interventions in the flowsheet.   Outcome: Progressing Towards Goal  Note: Pressure Injury Interventions:  Sensory Interventions: Keep linens dry and wrinkle-free, Minimize linen layers         Activity Interventions: Increase time out of bed, Pressure redistribution bed/mattress(bed type)    Mobility Interventions: HOB 30 degrees or less, Pressure redistribution bed/mattress (bed type)    Nutrition Interventions: Document food/fluid/supplement intake    Friction and Shear Interventions: Apply protective barrier, creams and emollients, Minimize layers

## 2021-01-20 NOTE — PROGRESS NOTES
Bedside shift change report given to Nahomy Win RN (oncoming nurse) by Yong Sheriff RN (offgoing nurse). Report included the following information SBAR, Kardex, [facility] and Cardiac Rhythm Paced.

## 2021-01-20 NOTE — PROGRESS NOTES
Problem: Falls - Risk of  Goal: *Absence of Falls  Description: Document Vee Mcdonald Fall Risk and appropriate interventions in the flowsheet. 1/20/2021 1506 by Tim Wilson RN  Outcome: Resolved/Met  1/20/2021 1115 by Tim Wilson RN  Outcome: Progressing Towards Goal  Note: Fall Risk Interventions:  Mobility Interventions: Bed/chair exit alarm         Medication Interventions: Bed/chair exit alarm    Elimination Interventions: Bed/chair exit alarm              Problem: Patient Education: Go to Patient Education Activity  Goal: Patient/Family Education  Outcome: Resolved/Met     Problem: Pressure Injury - Risk of  Goal: *Prevention of pressure injury  Description: Document Obey Scale and appropriate interventions in the flowsheet.   Outcome: Resolved/Met     Problem: Patient Education: Go to Patient Education Activity  Goal: Patient/Family Education  Outcome: Resolved/Met     Problem: Patient Education: Go to Patient Education Activity  Goal: Patient/Family Education  Outcome: Resolved/Met

## 2021-01-20 NOTE — DISCHARGE INSTRUCTIONS
Patient Discharge Instructions    Aniket Del Rio / [id number] : 1945    Admitted 2021 Discharged: 2021     Take Home Medications     . · It is important that you take the medication exactly as they are prescribed. · Keep your medication in the bottles provided by the pharmacist and keep a list of the medication names, dosages, and times to be taken in your wallet. · Do not take other medications without consulting your doctor. What to do at Home    Recommended diet: Cardiac Diet,     Recommended activity: Activity as tolerated, OK to shower    Additional Instructions: wound care right toe ulcers as instructed    Follow-up with Dr Mesfin Alcantar in 2 weeks, call [phone] for appt        Information obtained by :  I understand that if any problems occur once I am at home I am to contact my physician. I understand and acknowledge receipt of the instructions indicated above.                                                                                                                                            Physician's or R.N.'s Signature                                                                  Date/Time                                                                                                                                              Patient or Representative Signature                                                          Date/Time

## 2021-01-20 NOTE — PROGRESS NOTES
1/19/2021 -   SUE:  - RUR: 7%  - Disposition is for discharge to own home with transport via friends  - Patient has been cleared for discharge today, 1/20  - Patient's neighbor to be on site around 12:00 noon    Medicare pt has received, reviewed, and signed 2nd IM letter informing them of their right to appeal the discharge.  Signed copy has been placed on pt bedside chart.     Care Management Interventions  PCP Verified by CM: Yes  Mode of Transport at Discharge: Other (see comment)(a neighbor, Ling)  Hospital Transport Time of Discharge: 1200  MyChart Signup: No  Discharge Durable Medical Equipment: No  Health Maintenance Reviewed: Yes  Physical Therapy Consult: No  Occupational Therapy Consult: No  Speech Therapy Consult: No  Current Support Network: Lives Alone  Confirm Follow Up Transport: Family  Discharge Location  Discharge Placement: Home with family assistance   CRM: Jose Adkins MPH, [facility]; Z: [phone]    09:00 -   Patient disclosed that her bronze cane has been misplaced during current hospital stay.  CM contacted admitting, PACU, and Patient Advocacy.  None of the above depts have patient's cane.  CRM: Jose Adkins, MPH, [facility]; Z: [phone]

## 2021-01-20 NOTE — PROGRESS NOTES
Bedside and Verbal shift change report given to Zhao Alonso [facility] (oncoming nurse) by Jagdish Thayer RN  (offgoing nurse). Report included the following information SBAR, Kardex, ED Summary, MAR, Recent Results and Cardiac Rhythm Paced.

## 2021-01-20 NOTE — ROUTINE PROCESS
Attempted to schedule SUE PCP appt with Dr. Cholo Garcia , unable to reach practice at this time. Left voicemail and currently awaiting return call.

## 2021-01-21 NOTE — DISCHARGE SUMMARY
Behzad Serrano 2906 SUMMARY    Name:  Renate Barrios  MR#:  [id number]  :  1945  ACCOUNT #:  [de-identified]  ADMIT DATE:  2021  DISCHARGE DATE:  2021      FINAL DIAGNOSIS:  Peripheral vascular disease with ulceration, toes of right foot. PROCEDURE:  Redo right fem-pop bypass. HISTORY:  The patient is a 73-year-old female who had a previous right fem-pop bypass which has now failed. She has a nonhealing ulcer on her right second toe with associated pain. She is admitted for a redo bypass. HOSPITAL COURSE:  The patient was admitted and taken to the operating room where she underwent a redo femoral to above-knee popliteal bypass using a prosthetic graft. Her postoperative course was uncomplicated. She had persistent foot pain related to her ulceration. Her bypass functioned well with a palpable posterior tibial pulse at the ankle. She was able to be discharged home in stable clinical condition on the second postoperative day. At that time, her bypass graft continues to function. Her incisions are healing well and she is ambulating and tolerating a diet and p.o. pain medications. She is discharged on her usual medications with the addition of Norco for pain. She is also discharged on a cardiac diet, activity as tolerated and follow up in my office in 2 weeks. DISPOSITION:  Home.       MD NORRIS Pettit/[email or web address]  D:  2021 11:02  T:  2021 13:57  JOB #:  [id number]

## 2021-11-25 PROCEDURE — 93294 REM INTERROG EVL PM/LDLS PM: CPT | Performed by: INTERNAL MEDICINE

## 2021-11-25 PROCEDURE — 93296 REM INTERROG EVL PM/IDS: CPT | Performed by: INTERNAL MEDICINE

## 2021-11-26 ENCOUNTER — OFFICE VISIT (OUTPATIENT)
Dept: CARDIOLOGY CLINIC | Age: 76
End: 2021-11-26
Payer: MEDICARE

## 2021-11-26 DIAGNOSIS — I44.1 SECOND DEGREE AV BLOCK: ICD-10-CM

## 2021-11-26 DIAGNOSIS — Z95.0 CARDIAC PACEMAKER IN SITU: Primary | ICD-10-CM

## 2021-11-26 DIAGNOSIS — I49.5 SSS (SICK SINUS SYNDROME) (HCC): ICD-10-CM

## 2022-02-28 ENCOUNTER — OFFICE VISIT (OUTPATIENT)
Dept: CARDIOLOGY CLINIC | Age: 77
End: 2022-02-28
Payer: MEDICARE

## 2022-02-28 DIAGNOSIS — Z95.0 CARDIAC PACEMAKER IN SITU: Primary | ICD-10-CM

## 2022-02-28 DIAGNOSIS — I49.5 SSS (SICK SINUS SYNDROME) (HCC): ICD-10-CM

## 2022-02-28 DIAGNOSIS — I44.1 SECOND DEGREE AV BLOCK: ICD-10-CM

## 2022-02-28 PROCEDURE — 93296 REM INTERROG EVL PM/IDS: CPT | Performed by: INTERNAL MEDICINE

## 2022-02-28 PROCEDURE — 93294 REM INTERROG EVL PM/LDLS PM: CPT | Performed by: INTERNAL MEDICINE

## 2022-03-19 PROBLEM — I70.209 ATHEROSCLEROTIC PVD WITH ULCERATION (HCC): Status: ACTIVE | Noted: 2021-01-18

## 2022-03-19 PROBLEM — L98.499 ATHEROSCLEROTIC PVD WITH ULCERATION (HCC): Status: ACTIVE | Noted: 2021-01-18

## 2022-03-19 PROBLEM — I49.5 SSS (SICK SINUS SYNDROME) (HCC): Status: ACTIVE | Noted: 2020-05-08

## 2022-03-19 PROBLEM — I73.9 PVD (PERIPHERAL VASCULAR DISEASE) (HCC): Status: ACTIVE | Noted: 2017-10-18

## 2023-08-23 NOTE — PERIOP NOTES
PATIENT CALLED AND MADE AWARE OF COVID-19 TESTING REQUIRED TO BE DONE WITHIN 96 HOURS OF SURGERY. COVID-19 TESTING APPOINTMENT MADE FOR PATIENT. PATIENT INSTRUCTED ON SELF QUARANTINE BETWEEN TESTING AND ARRIVAL TIME DAY OF SURGERY. INSTRUCTED NOT TO USE NASAL SPRAY OR NASAL OINTMENTS DAY OF TESTING, PRIOR TO TEST. LOCATION OF TESTING DISCUSSED WITH PATIENT. Patient picked up by [facility] for transfer to [place], legal hold paperwork given to [facility], discharge packet to [facility]. Belongings transferred with [facility]. Patient discharged to psych facility.

## 2024-03-08 ENCOUNTER — HOSPITAL ENCOUNTER (INPATIENT)
Facility: HOSPITAL | Age: 79
LOS: 1 days | Discharge: LEFT BEFORE DISCHARGE | DRG: 301 | End: 2024-03-09
Attending: STUDENT IN AN ORGANIZED HEALTH CARE EDUCATION/TRAINING PROGRAM | Admitting: FAMILY MEDICINE
Payer: COMMERCIAL

## 2024-03-08 ENCOUNTER — APPOINTMENT (OUTPATIENT)
Facility: HOSPITAL | Age: 79
DRG: 301 | End: 2024-03-08
Payer: COMMERCIAL

## 2024-03-08 DIAGNOSIS — I96 NECROTIC TOES (HCC): ICD-10-CM

## 2024-03-08 DIAGNOSIS — I70.209 FEMORAL ARTERY STENOSIS (HCC): ICD-10-CM

## 2024-03-08 DIAGNOSIS — S31.109A NONHEALING OPEN WOUND OF GROIN: Primary | ICD-10-CM

## 2024-03-08 DIAGNOSIS — A41.9 SEPTICEMIA (HCC): ICD-10-CM

## 2024-03-08 PROBLEM — R52 INTRACTABLE PAIN: Status: ACTIVE | Noted: 2024-03-08

## 2024-03-08 LAB
ALBUMIN SERPL-MCNC: 2.8 G/DL (ref 3.5–5)
ALBUMIN/GLOB SERPL: 0.7 (ref 1.1–2.2)
ALP SERPL-CCNC: 151 U/L (ref 45–117)
ALT SERPL-CCNC: 35 U/L (ref 12–78)
ANION GAP BLD CALC-SCNC: 10 (ref 10–20)
ANION GAP SERPL CALC-SCNC: 3 MMOL/L (ref 5–15)
APTT PPP: 28.5 SEC (ref 22.1–31)
AST SERPL-CCNC: 23 U/L (ref 15–37)
BASE EXCESS BLD CALC-SCNC: 2.5 MMOL/L
BASOPHILS # BLD: 0.1 K/UL (ref 0–0.1)
BASOPHILS NFR BLD: 1 % (ref 0–1)
BILIRUB SERPL-MCNC: 1.2 MG/DL (ref 0.2–1)
BUN SERPL-MCNC: 5 MG/DL (ref 6–20)
BUN/CREAT SERPL: 11 (ref 12–20)
CA-I BLD-MCNC: 1.23 MMOL/L (ref 1.12–1.32)
CALCIUM SERPL-MCNC: 8.9 MG/DL (ref 8.5–10.1)
CHLORIDE BLD-SCNC: 103 MMOL/L (ref 100–108)
CHLORIDE SERPL-SCNC: 104 MMOL/L (ref 97–108)
CO2 BLD-SCNC: 28 MMOL/L (ref 19–24)
CO2 SERPL-SCNC: 29 MMOL/L (ref 21–32)
CREAT SERPL-MCNC: 0.47 MG/DL (ref 0.55–1.02)
CREAT UR-MCNC: <0.3 MG/DL (ref 0.6–1.3)
DIFFERENTIAL METHOD BLD: ABNORMAL
EOSINOPHIL # BLD: 0.1 K/UL (ref 0–0.4)
EOSINOPHIL NFR BLD: 1 % (ref 0–7)
ERYTHROCYTE [DISTWIDTH] IN BLOOD BY AUTOMATED COUNT: 16.7 % (ref 11.5–14.5)
GLOBULIN SER CALC-MCNC: 3.8 G/DL (ref 2–4)
GLUCOSE BLD STRIP.AUTO-MCNC: 181 MG/DL (ref 74–106)
GLUCOSE SERPL-MCNC: 171 MG/DL (ref 65–100)
HCO3 BLDA-SCNC: 28 MMOL/L
HCT VFR BLD AUTO: 34.9 % (ref 35–47)
HGB BLD-MCNC: 11.8 G/DL (ref 11.5–16)
IMM GRANULOCYTES # BLD AUTO: 0 K/UL (ref 0–0.04)
IMM GRANULOCYTES NFR BLD AUTO: 0 % (ref 0–0.5)
INR PPP: 1.1 (ref 0.9–1.1)
LACTATE BLD-SCNC: 1.57 MMOL/L (ref 0.4–2)
LACTATE SERPL-SCNC: 1.7 MMOL/L (ref 0.4–2)
LYMPHOCYTES # BLD: 1.4 K/UL (ref 0.8–3.5)
LYMPHOCYTES NFR BLD: 20 % (ref 12–49)
MCH RBC QN AUTO: 31.3 PG (ref 26–34)
MCHC RBC AUTO-ENTMCNC: 33.8 G/DL (ref 30–36.5)
MCV RBC AUTO: 92.6 FL (ref 80–99)
MONOCYTES # BLD: 0.6 K/UL (ref 0–1)
MONOCYTES NFR BLD: 8 % (ref 5–13)
NEUTS SEG # BLD: 4.9 K/UL (ref 1.8–8)
NEUTS SEG NFR BLD: 70 % (ref 32–75)
NRBC # BLD: 0 K/UL (ref 0–0.01)
NRBC BLD-RTO: 0 PER 100 WBC
PCO2 BLDV: 46.3 MMHG (ref 41–51)
PH BLDV: 7.39 (ref 7.32–7.42)
PLATELET # BLD AUTO: 650 K/UL (ref 150–400)
PMV BLD AUTO: 9.3 FL (ref 8.9–12.9)
PO2 BLDV: <27 MMHG (ref 25–40)
POTASSIUM BLD-SCNC: 3.2 MMOL/L (ref 3.5–5.5)
POTASSIUM SERPL-SCNC: 3.1 MMOL/L (ref 3.5–5.1)
PROCALCITONIN SERPL-MCNC: <0.05 NG/ML
PROT SERPL-MCNC: 6.6 G/DL (ref 6.4–8.2)
PROTHROMBIN TIME: 11.9 SEC (ref 9–11.1)
RBC # BLD AUTO: 3.77 M/UL (ref 3.8–5.2)
RBC MORPH BLD: ABNORMAL
SODIUM BLD-SCNC: 141 MMOL/L (ref 136–145)
SODIUM SERPL-SCNC: 136 MMOL/L (ref 136–145)
SPECIMEN SITE: ABNORMAL
THERAPEUTIC RANGE: NORMAL SECS (ref 58–77)
TROPONIN I SERPL HS-MCNC: 20 NG/L (ref 0–51)
WBC # BLD AUTO: 7.1 K/UL (ref 3.6–11)

## 2024-03-08 PROCEDURE — 84295 ASSAY OF SERUM SODIUM: CPT

## 2024-03-08 PROCEDURE — 83605 ASSAY OF LACTIC ACID: CPT

## 2024-03-08 PROCEDURE — 6370000000 HC RX 637 (ALT 250 FOR IP): Performed by: FAMILY MEDICINE

## 2024-03-08 PROCEDURE — 84132 ASSAY OF SERUM POTASSIUM: CPT

## 2024-03-08 PROCEDURE — 82803 BLOOD GASES ANY COMBINATION: CPT

## 2024-03-08 PROCEDURE — 85025 COMPLETE CBC W/AUTO DIFF WBC: CPT

## 2024-03-08 PROCEDURE — 2580000003 HC RX 258: Performed by: STUDENT IN AN ORGANIZED HEALTH CARE EDUCATION/TRAINING PROGRAM

## 2024-03-08 PROCEDURE — 84145 PROCALCITONIN (PCT): CPT

## 2024-03-08 PROCEDURE — 85730 THROMBOPLASTIN TIME PARTIAL: CPT

## 2024-03-08 PROCEDURE — 82947 ASSAY GLUCOSE BLOOD QUANT: CPT

## 2024-03-08 PROCEDURE — 2580000003 HC RX 258: Performed by: FAMILY MEDICINE

## 2024-03-08 PROCEDURE — 75635 CT ANGIO ABDOMINAL ARTERIES: CPT

## 2024-03-08 PROCEDURE — 71045 X-RAY EXAM CHEST 1 VIEW: CPT

## 2024-03-08 PROCEDURE — 80053 COMPREHEN METABOLIC PANEL: CPT

## 2024-03-08 PROCEDURE — 82330 ASSAY OF CALCIUM: CPT

## 2024-03-08 PROCEDURE — 36415 COLL VENOUS BLD VENIPUNCTURE: CPT

## 2024-03-08 PROCEDURE — 6360000004 HC RX CONTRAST MEDICATION: Performed by: STUDENT IN AN ORGANIZED HEALTH CARE EDUCATION/TRAINING PROGRAM

## 2024-03-08 PROCEDURE — 6360000002 HC RX W HCPCS: Performed by: STUDENT IN AN ORGANIZED HEALTH CARE EDUCATION/TRAINING PROGRAM

## 2024-03-08 PROCEDURE — 84484 ASSAY OF TROPONIN QUANT: CPT

## 2024-03-08 PROCEDURE — 2060000000 HC ICU INTERMEDIATE R&B

## 2024-03-08 PROCEDURE — 85610 PROTHROMBIN TIME: CPT

## 2024-03-08 PROCEDURE — 87040 BLOOD CULTURE FOR BACTERIA: CPT

## 2024-03-08 RX ORDER — POTASSIUM CHLORIDE 750 MG/1
40 TABLET, FILM COATED, EXTENDED RELEASE ORAL PRN
Status: DISCONTINUED | OUTPATIENT
Start: 2024-03-08 | End: 2024-03-09 | Stop reason: HOSPADM

## 2024-03-08 RX ORDER — SODIUM CHLORIDE 0.9 % (FLUSH) 0.9 %
5-40 SYRINGE (ML) INJECTION EVERY 12 HOURS SCHEDULED
Status: DISCONTINUED | OUTPATIENT
Start: 2024-03-08 | End: 2024-03-09 | Stop reason: HOSPADM

## 2024-03-08 RX ORDER — SODIUM CHLORIDE 9 MG/ML
INJECTION, SOLUTION INTRAVENOUS PRN
Status: DISCONTINUED | OUTPATIENT
Start: 2024-03-08 | End: 2024-03-09 | Stop reason: HOSPADM

## 2024-03-08 RX ORDER — POLYETHYLENE GLYCOL 3350 17 G/17G
17 POWDER, FOR SOLUTION ORAL DAILY PRN
Status: DISCONTINUED | OUTPATIENT
Start: 2024-03-08 | End: 2024-03-09 | Stop reason: HOSPADM

## 2024-03-08 RX ORDER — ACETAMINOPHEN 650 MG/1
650 SUPPOSITORY RECTAL EVERY 6 HOURS PRN
Status: DISCONTINUED | OUTPATIENT
Start: 2024-03-08 | End: 2024-03-09 | Stop reason: HOSPADM

## 2024-03-08 RX ORDER — SODIUM CHLORIDE 0.9 % (FLUSH) 0.9 %
5-40 SYRINGE (ML) INJECTION PRN
Status: DISCONTINUED | OUTPATIENT
Start: 2024-03-08 | End: 2024-03-09 | Stop reason: HOSPADM

## 2024-03-08 RX ORDER — ACETAMINOPHEN 325 MG/1
650 TABLET ORAL EVERY 6 HOURS PRN
Status: DISCONTINUED | OUTPATIENT
Start: 2024-03-08 | End: 2024-03-09 | Stop reason: HOSPADM

## 2024-03-08 RX ORDER — ONDANSETRON 4 MG/1
4 TABLET, ORALLY DISINTEGRATING ORAL EVERY 8 HOURS PRN
Status: DISCONTINUED | OUTPATIENT
Start: 2024-03-08 | End: 2024-03-09 | Stop reason: HOSPADM

## 2024-03-08 RX ORDER — HYDROMORPHONE HYDROCHLORIDE 1 MG/ML
1 INJECTION, SOLUTION INTRAMUSCULAR; INTRAVENOUS; SUBCUTANEOUS ONCE
Status: COMPLETED | OUTPATIENT
Start: 2024-03-08 | End: 2024-03-08

## 2024-03-08 RX ORDER — ONDANSETRON 2 MG/ML
4 INJECTION INTRAMUSCULAR; INTRAVENOUS EVERY 6 HOURS PRN
Status: DISCONTINUED | OUTPATIENT
Start: 2024-03-08 | End: 2024-03-09 | Stop reason: HOSPADM

## 2024-03-08 RX ORDER — POTASSIUM CHLORIDE 7.45 MG/ML
10 INJECTION INTRAVENOUS PRN
Status: DISCONTINUED | OUTPATIENT
Start: 2024-03-08 | End: 2024-03-09 | Stop reason: HOSPADM

## 2024-03-08 RX ORDER — MAGNESIUM SULFATE IN WATER 40 MG/ML
2000 INJECTION, SOLUTION INTRAVENOUS PRN
Status: DISCONTINUED | OUTPATIENT
Start: 2024-03-08 | End: 2024-03-09 | Stop reason: HOSPADM

## 2024-03-08 RX ADMIN — POTASSIUM CHLORIDE 40 MEQ: 750 TABLET, EXTENDED RELEASE ORAL at 23:05

## 2024-03-08 RX ADMIN — IOPAMIDOL 100 ML: 755 INJECTION, SOLUTION INTRAVENOUS at 19:41

## 2024-03-08 RX ADMIN — Medication 5 ML: at 23:05

## 2024-03-08 RX ADMIN — HYDROMORPHONE HYDROCHLORIDE 1 MG: 1 INJECTION, SOLUTION INTRAMUSCULAR; INTRAVENOUS; SUBCUTANEOUS at 18:41

## 2024-03-08 RX ADMIN — IOPAMIDOL 20 ML: 755 INJECTION, SOLUTION INTRAVENOUS at 19:41

## 2024-03-08 RX ADMIN — VANCOMYCIN HYDROCHLORIDE 1000 MG: 1 INJECTION, POWDER, LYOPHILIZED, FOR SOLUTION INTRAVENOUS at 22:06

## 2024-03-08 ASSESSMENT — PAIN SCALES - GENERAL
PAINLEVEL_OUTOF10: 10
PAINLEVEL_OUTOF10: 6
PAINLEVEL_OUTOF10: 10

## 2024-03-08 ASSESSMENT — PAIN DESCRIPTION - LOCATION
LOCATION: SACRUM
LOCATION: LEG
LOCATION: LEG;BUTTOCKS

## 2024-03-08 ASSESSMENT — PAIN DESCRIPTION - ORIENTATION
ORIENTATION: LEFT
ORIENTATION: LOWER;LEFT
ORIENTATION: LEFT

## 2024-03-08 ASSESSMENT — PAIN - FUNCTIONAL ASSESSMENT
PAIN_FUNCTIONAL_ASSESSMENT: PREVENTS OR INTERFERES SOME ACTIVE ACTIVITIES AND ADLS
PAIN_FUNCTIONAL_ASSESSMENT: ACTIVITIES ARE NOT PREVENTED
PAIN_FUNCTIONAL_ASSESSMENT: 0-10
PAIN_FUNCTIONAL_ASSESSMENT: PREVENTS OR INTERFERES WITH MANY ACTIVE NOT PASSIVE ACTIVITIES

## 2024-03-08 ASSESSMENT — PAIN DESCRIPTION - PAIN TYPE: TYPE: ACUTE PAIN

## 2024-03-08 ASSESSMENT — PAIN DESCRIPTION - DIRECTION: RADIATING_TOWARDS: LEFT TOES

## 2024-03-08 ASSESSMENT — PAIN DESCRIPTION - DESCRIPTORS
DESCRIPTORS: ACHING
DESCRIPTORS: THROBBING

## 2024-03-08 NOTE — ED TRIAGE NOTES
Pt arrives via EMS c/o L leg and toe pain. Pt was seen at [facility] for decreased circulation to lower extremities with multiple surgeries but left AMA on Sunday after admission. Pt has 4 necrotic toes on L foot and wounds on sacrum. Per EMS pt refused to go back to [facility] for evaluation.

## 2024-03-09 VITALS
DIASTOLIC BLOOD PRESSURE: 60 MMHG | BODY MASS INDEX: 16.49 KG/M2 | TEMPERATURE: 98.2 F | OXYGEN SATURATION: 97 % | RESPIRATION RATE: 18 BRPM | HEART RATE: 86 BPM | WEIGHT: 84 LBS | SYSTOLIC BLOOD PRESSURE: 114 MMHG | HEIGHT: 60 IN

## 2024-03-09 LAB
AMPHET UR QL SCN: NEGATIVE
ANION GAP SERPL CALC-SCNC: 7 MMOL/L (ref 5–15)
APTT PPP: 26.6 SEC (ref 22.1–31)
BARBITURATES UR QL SCN: NEGATIVE
BASOPHILS # BLD: 0.1 K/UL (ref 0–0.1)
BASOPHILS NFR BLD: 2 % (ref 0–1)
BENZODIAZ UR QL: NEGATIVE
BUN SERPL-MCNC: 7 MG/DL (ref 6–20)
BUN/CREAT SERPL: 18 (ref 12–20)
CALCIUM SERPL-MCNC: 9.2 MG/DL (ref 8.5–10.1)
CANNABINOIDS UR QL SCN: NEGATIVE
CHLORIDE SERPL-SCNC: 106 MMOL/L (ref 97–108)
CO2 SERPL-SCNC: 25 MMOL/L (ref 21–32)
COCAINE UR QL SCN: NEGATIVE
CREAT SERPL-MCNC: 0.39 MG/DL (ref 0.55–1.02)
DIFFERENTIAL METHOD BLD: ABNORMAL
EOSINOPHIL # BLD: 0.1 K/UL (ref 0–0.4)
EOSINOPHIL NFR BLD: 1 % (ref 0–7)
ERYTHROCYTE [DISTWIDTH] IN BLOOD BY AUTOMATED COUNT: 17.3 % (ref 11.5–14.5)
GLUCOSE SERPL-MCNC: 126 MG/DL (ref 65–100)
HCT VFR BLD AUTO: 40.7 % (ref 35–47)
HGB BLD-MCNC: 13.8 G/DL (ref 11.5–16)
IMM GRANULOCYTES # BLD AUTO: 0.1 K/UL (ref 0–0.04)
IMM GRANULOCYTES NFR BLD AUTO: 1 % (ref 0–0.5)
INR PPP: 1.1 (ref 0.9–1.1)
LYMPHOCYTES # BLD: 1.3 K/UL (ref 0.8–3.5)
LYMPHOCYTES NFR BLD: 20 % (ref 12–49)
Lab: ABNORMAL
MCH RBC QN AUTO: 31.8 PG (ref 26–34)
MCHC RBC AUTO-ENTMCNC: 33.9 G/DL (ref 30–36.5)
MCV RBC AUTO: 93.8 FL (ref 80–99)
METHADONE UR QL: NEGATIVE
MONOCYTES # BLD: 0.5 K/UL (ref 0–1)
MONOCYTES NFR BLD: 8 % (ref 5–13)
NEUTS SEG # BLD: 4.5 K/UL (ref 1.8–8)
NEUTS SEG NFR BLD: 68 % (ref 32–75)
NRBC # BLD: 0 K/UL (ref 0–0.01)
NRBC BLD-RTO: 0 PER 100 WBC
OPIATES UR QL: POSITIVE
PCP UR QL: NEGATIVE
PLATELET # BLD AUTO: 701 K/UL (ref 150–400)
PMV BLD AUTO: 9.2 FL (ref 8.9–12.9)
POTASSIUM SERPL-SCNC: 4.2 MMOL/L (ref 3.5–5.1)
PROTHROMBIN TIME: 11.6 SEC (ref 9–11.1)
RBC # BLD AUTO: 4.34 M/UL (ref 3.8–5.2)
RBC MORPH BLD: ABNORMAL
SODIUM SERPL-SCNC: 138 MMOL/L (ref 136–145)
THERAPEUTIC RANGE: NORMAL SECS (ref 58–77)
UFH PPP CHRO-ACNC: <0.1 IU/ML
WBC # BLD AUTO: 6.6 K/UL (ref 3.6–11)

## 2024-03-09 PROCEDURE — 85520 HEPARIN ASSAY: CPT

## 2024-03-09 PROCEDURE — 85025 COMPLETE CBC W/AUTO DIFF WBC: CPT

## 2024-03-09 PROCEDURE — 80307 DRUG TEST PRSMV CHEM ANLYZR: CPT

## 2024-03-09 PROCEDURE — 6360000002 HC RX W HCPCS: Performed by: FAMILY MEDICINE

## 2024-03-09 PROCEDURE — 36415 COLL VENOUS BLD VENIPUNCTURE: CPT

## 2024-03-09 PROCEDURE — 6370000000 HC RX 637 (ALT 250 FOR IP): Performed by: INTERNAL MEDICINE

## 2024-03-09 PROCEDURE — 80048 BASIC METABOLIC PNL TOTAL CA: CPT

## 2024-03-09 PROCEDURE — 85730 THROMBOPLASTIN TIME PARTIAL: CPT

## 2024-03-09 PROCEDURE — 6370000000 HC RX 637 (ALT 250 FOR IP): Performed by: FAMILY MEDICINE

## 2024-03-09 PROCEDURE — 85610 PROTHROMBIN TIME: CPT

## 2024-03-09 RX ORDER — HEPARIN SODIUM 1000 [USP'U]/ML
60 INJECTION, SOLUTION INTRAVENOUS; SUBCUTANEOUS PRN
Status: DISCONTINUED | OUTPATIENT
Start: 2024-03-09 | End: 2024-03-09 | Stop reason: HOSPADM

## 2024-03-09 RX ORDER — CLOPIDOGREL BISULFATE 75 MG/1
75 TABLET ORAL DAILY
Status: DISCONTINUED | OUTPATIENT
Start: 2024-03-09 | End: 2024-03-09 | Stop reason: HOSPADM

## 2024-03-09 RX ORDER — AMLODIPINE BESYLATE 5 MG/1
5 TABLET ORAL DAILY
Status: DISCONTINUED | OUTPATIENT
Start: 2024-03-09 | End: 2024-03-09 | Stop reason: HOSPADM

## 2024-03-09 RX ORDER — OXYCODONE HYDROCHLORIDE 5 MG/1
5 TABLET ORAL EVERY 8 HOURS PRN
Qty: 9 TABLET | Refills: 0 | Status: SHIPPED | OUTPATIENT
Start: 2024-03-09 | End: 2024-03-12

## 2024-03-09 RX ORDER — NICOTINE 21 MG/24HR
1 PATCH, TRANSDERMAL 24 HOURS TRANSDERMAL DAILY
Status: DISCONTINUED | OUTPATIENT
Start: 2024-03-09 | End: 2024-03-09 | Stop reason: HOSPADM

## 2024-03-09 RX ORDER — HEPARIN SODIUM 10000 [USP'U]/100ML
5-30 INJECTION, SOLUTION INTRAVENOUS CONTINUOUS
Status: DISCONTINUED | OUTPATIENT
Start: 2024-03-09 | End: 2024-03-09

## 2024-03-09 RX ORDER — OXYCODONE HYDROCHLORIDE 5 MG/1
5 TABLET ORAL EVERY 4 HOURS PRN
Status: DISCONTINUED | OUTPATIENT
Start: 2024-03-09 | End: 2024-03-09 | Stop reason: HOSPADM

## 2024-03-09 RX ORDER — HEPARIN SODIUM 1000 [USP'U]/ML
60 INJECTION, SOLUTION INTRAVENOUS; SUBCUTANEOUS ONCE
Status: COMPLETED | OUTPATIENT
Start: 2024-03-09 | End: 2024-03-09

## 2024-03-09 RX ORDER — ATORVASTATIN CALCIUM 40 MG/1
80 TABLET, FILM COATED ORAL DAILY
Status: DISCONTINUED | OUTPATIENT
Start: 2024-03-09 | End: 2024-03-09 | Stop reason: HOSPADM

## 2024-03-09 RX ORDER — NICOTINE 21 MG/24HR
1 PATCH, TRANSDERMAL 24 HOURS TRANSDERMAL DAILY
Qty: 30 PATCH | Refills: 3 | Status: SHIPPED | OUTPATIENT
Start: 2024-03-10

## 2024-03-09 RX ORDER — HEPARIN SODIUM 1000 [USP'U]/ML
30 INJECTION, SOLUTION INTRAVENOUS; SUBCUTANEOUS PRN
Status: DISCONTINUED | OUTPATIENT
Start: 2024-03-09 | End: 2024-03-09 | Stop reason: HOSPADM

## 2024-03-09 RX ADMIN — AMLODIPINE BESYLATE 5 MG: 5 TABLET ORAL at 10:26

## 2024-03-09 RX ADMIN — HEPARIN SODIUM 12 UNITS/KG/HR: 10000 INJECTION, SOLUTION INTRAVENOUS at 07:25

## 2024-03-09 RX ADMIN — HEPARIN SODIUM 2290 UNITS: 1000 INJECTION INTRAVENOUS; SUBCUTANEOUS at 07:20

## 2024-03-09 RX ADMIN — CLOPIDOGREL BISULFATE 75 MG: 75 TABLET, FILM COATED ORAL at 10:26

## 2024-03-09 RX ADMIN — ATORVASTATIN CALCIUM 80 MG: 40 TABLET, FILM COATED ORAL at 10:26

## 2024-03-09 RX ADMIN — OXYCODONE 5 MG: 5 TABLET ORAL at 10:26

## 2024-03-09 ASSESSMENT — PAIN SCALES - GENERAL: PAINLEVEL_OUTOF10: 6

## 2024-03-09 ASSESSMENT — PAIN DESCRIPTION - LOCATION: LOCATION: LEG;BACK;BUTTOCKS

## 2024-03-09 ASSESSMENT — PAIN DESCRIPTION - ORIENTATION: ORIENTATION: LEFT

## 2024-03-09 ASSESSMENT — PAIN DESCRIPTION - DESCRIPTORS: DESCRIPTORS: DISCOMFORT

## 2024-03-09 NOTE — ED PROVIDER NOTES
[facility] EMERGENCY DEP  EMERGENCY DEPARTMENT ENCOUNTER      Pt Name: Abby Quinn  MRN: [id number]  Birthdate 1945  Date of evaluation: 3/8/2024  Provider: Lyly Christopher DO    CHIEF COMPLAINT       Chief Complaint   Patient presents with    Leg Pain       [facility]   Past Medical History:   Diagnosis Date    Arthritis     Arthritis     Asthma     Chronic obstructive pulmonary disease (HCC)     Depression     GERD (gastroesophageal reflux disease)     Heart murmur     benign    Hypertension     not on medication    Ill-defined condition     chronic opiod use    Ill-defined condition     polyneuropathy    Ill-defined condition     pancreatitis    Ill-defined condition     cerebral atherosclerosis    Lung disease     Seizures (HCC)     2016 last seizure    Stroke (HCC)     tia    Thromboembolus (HCC)     right leg         MDM:   Vitals:    Vitals:    03/08/24 1900   BP: 123/63   Pulse: 88   Resp: 18   Temp:    SpO2:            This is a 78 y.o. female with pmhx hypertension, hyperlipidemia, GERD, PAD, CAD who presents today for cc of left leg pain.  Patient states that she left [facility] after an extended hospitalization AMA, she had several procedures for revascularization.  She states that today she started having worsening left  foot pain.  She also complains of several wounds that are not healing, including on the sacrum and over the left groin access site.  She denies any fever or chills at home, she does state that she has just been very tired and is unable to take the pain.  She rates a 10 out of 10 on the pain scale.  Throbbing and nonradiating.  No chest pain, dyspnea, abdominal pain, nausea, vomiting.    On arrival VS stable.   Physical Exam  General: Alert, no acute distress  HEENT: Normocephalic, atraumatic. EOMI, moist oral mucosa, no conjunctival injection  Neck: ROM normal, supple  Cardio: Heart regular rate and rhythm, cap refill <2seconds  Lungs: No respiratory distress, no wheezing, CTAB  Abdomen:

## 2024-03-09 NOTE — PROGRESS NOTES
Multiple attempts made for lab draw to confirm heparin, will obtain art stick for lab awaiting respiratory.

## 2024-03-09 NOTE — CONSULTS
Consult    Patient: Abby Quinn MRN: [id number]  SSN: [id number]    YOB: 1945  Age: 78 y.o.  Sex: female      Subjective:      Abby Quinn is a 78 y.o. female who is being seen for evaluation bilateral leg pain.    Her history is well described by the admission H&P: She was hospitalized at [facility] from 2/19-3/2. She underwnet RLE angiogram with angioplasty of distal popliteal and PT arteries, left common femoral, and superficial femoral artery endarterectomy, left CEA/EIA stenting, LLE angiogram with left iliofemoral thrombectomy, and left fem pop bypass.  Her hospitalization was complicated by hypotension, and anemia requiring pressors, and hemoihorax requiring chest tube and intubation, and decreased EF requiring life vest.  After being managed in the ICU, and getting extubated, off pressors, and chest tube removed, she signed out of the hospital AMA.     She presents today with b/l LE pain,  In the ED, she was hypotensive to 86/57 with improvement to 110/55.  Other VSS.     Past Medical History:   Diagnosis Date    Arthritis     Arthritis     Asthma     Chronic obstructive pulmonary disease (HCC)     Depression     GERD (gastroesophageal reflux disease)     Heart murmur     benign    Hypertension     not on medication    Ill-defined condition     chronic opiod use    Ill-defined condition     polyneuropathy    Ill-defined condition     pancreatitis    Ill-defined condition     cerebral atherosclerosis    Lung disease     Seizures (HCC)     2016 last seizure    Stroke (HCC)     tia    Thromboembolus (HCC)     right leg     Past Surgical History:   Procedure Laterality Date    APPENDECTOMY  1972    BREAST SURGERY      left breast lumpectomy    BYPASS GRAFT OTHR,FEM-POP      COLONOSCOPY      HAMMER TOE SURGERY Right 7/302017    INS NEW/RPLCMT PRM PM W/TRANSV ELTRD ATRIAL&VENT N/A 5/8/2020    INSERT PPM DUAL performed by José Miguel Lane MD at [facility] CARDIAC CATH LAB    MOHS SURGERY Left 2009

## 2024-03-09 NOTE — DISCHARGE INSTRUCTIONS
Discharge Instructions       PATIENT ID: Abby Quinn  MRN: [id number]   YOB: 1945    DATE OF ADMISSION: [unfilled]    DATE OF DISCHARGE: 3/9/2024    PRIMARY CARE PROVIDER: @PCP@     ATTENDING PHYSICIAN: [unfilled]  DISCHARGING PROVIDER: Sushma Madala, MD    To contact this individual call [phone] and ask the  to page.   If unavailable ask to be transferred the Adult Hospitalist Department.    DISCHARGE DIAGNOSES Severe Peripheral Vascular disease. Left toe gangrene     CONSULTATIONS: [unfilled]    PROCEDURES/SURGERIES: * No surgery found *    PENDING TEST RESULTS:   At the time of discharge the following test results are still pending: none     FOLLOW UP APPOINTMENTS:   [unfilled]   PCP in 1-2 weeks  [facility] cardiology, Vascular surgery and Podiatry in 1-2 weeks     ADDITIONAL CARE RECOMMENDATIONS:   Compliance with medications     DIET: cardiac diet  Oral Nutritional Supplements:     ACTIVITY: activity as tolerated    Radiology      DISCHARGE MEDICATIONS:   See Medication Reconciliation Form    It is important that you take the medication exactly as they are prescribed.   Keep your medication in the bottles provided by the pharmacist and keep a list of the medication names, dosages, and times to be taken in your wallet.   Do not take other medications without consulting your doctor.       NOTIFY YOUR PHYSICIAN FOR ANY OF THE FOLLOWING:   Fever over 101 degrees for 24 hours.   Chest pain, shortness of breath, fever, chills, nausea, vomiting, diarrhea, change in mentation, falling, weakness, bleeding. Severe pain or pain not relieved by medications.  Or, any other signs or symptoms that you may have questions about.      DISPOSITION:  X  Home With:   OT  PT  HH  RN       SNF/Inpatient Rehab/LTAC    Independent/assisted living    Hospice    Other:     Signed:   Sushma Madala, MD  3/9/2024  3:56 PM   Self

## 2024-03-09 NOTE — DISCHARGE SUMMARY
NORVASC     atorvastatin 80 MG tablet  Commonly known as: LIPITOR     clopidogrel 75 MG tablet  Commonly known as: PLAVIX     gabapentin 600 MG tablet  Commonly known as: NEURONTIN     QUEtiapine 100 MG tablet  Commonly known as: SEROQUEL            STOP taking these medications      diclofenac 75 MG EC tablet  Commonly known as: VOLTAREN     Flovent  MCG/ACT inhaler  Generic drug: fluticasone     potassium chloride 20 MEQ extended release tablet  Commonly known as: KLOR-CON M     predniSONE 5 MG tablet  Commonly known as: DELTASONE               Where to Get Your Medications        These medications were sent to [id number] - [place], [place] - [address] [place] [address] - P [phone] - F [phone]  [address] [place] [id number]      Phone: [phone]   nicotine 21 MG/24HR  oxyCODONE 5 MG immediate release tablet  rivaroxaban 20 MG Tabs tablet           NOTIFY YOUR PHYSICIAN FOR ANY OF THE FOLLOWING:   Fever over 101 degrees for 24 hours.   Chest pain, shortness of breath, fever, chills, nausea, vomiting, diarrhea, change in mentation, falling, weakness, bleeding. Severe pain or pain not relieved by medications.  Or, any other signs or symptoms that you may have questions about.    DISPOSITION:   X Home With:   OT  PT  HH  RN       Long term SNF/Inpatient Rehab    Independent/assisted living    Hospice    Other:       PATIENT CONDITION AT DISCHARGE:     Functional status    Poor     Deconditioned     Independent      Cognition     Lucid     Forgetful     Dementia      Catheters/lines (plus indication)    Joshua     PICC     PEG     None      Code status    X Full code     DNR      PHYSICAL EXAMINATION AT DISCHARGE:    General : alert x 3, awake, moderate distress, frail, ill looking   HEENT: PEERL, EOMI, dry mucus membrane  Neck: supple, no JVD, no meningeal signs  Chest: Clear to auscultation bilaterally   CVS: S1 S2 heard, Capillary refill less than 2 seconds  Abd: soft/ non tender, non distended,

## 2024-03-09 NOTE — H&P
History and Physical    Date of Service:  3/8/2024  Primary Care Provider: Mikhail Heath III, MD  Source of information: patient, electronic medical record    Chief Complaint: Leg Pain      History of Presenting Illness:   Abby Quinn is a 78 y.o. female with a pmhx CAD, PAD, COPD, nicotine dependence, GERD, HtN, depression, past seizures, past stroke, past DVT, and polyneuropathy who presented with left leg pain,and is being admitted for left groin wound, and ischemic limb.    [facility]. She underwnet RLE angiogram with angioplasty of distal popliteal and PT arteries, left common femoral, and superficial femoral artery endarterectomy, left CEA/EIA stenting, LLE angiogram with left iliofemoral thrombectomy, and left fem pop bypass.  Her hospitalization was complicated by hypotension, and anemia requiring pressors, and hemoihorax requiring chest tube and intubation, and decreased EF requiring life vest.  After being managed in the ICU, and getting extubated, off pressors, and chest tube removed, she signed out of the hospital AMA.    She presents today with b/l LE pain,  In the ED, she was hypotensive to 86/57 with improvement to 110/55.  Other VSS.  CTA showed severe stenosis of distal left femoral artery and left popliteal artery.    In the ED,      REVIEW OF SYSTEMS:  A comprehensive review of systems was negative except for that written in the History of Present Illness.     Past Medical History:   Diagnosis Date    Arthritis     Arthritis     Asthma     Chronic obstructive pulmonary disease (HCC)     Depression     GERD (gastroesophageal reflux disease)     Heart murmur     benign    Hypertension     not on medication    Ill-defined condition     chronic opiod use    Ill-defined condition     polyneuropathy    Ill-defined condition     pancreatitis    Ill-defined condition     cerebral atherosclerosis    Lung disease     Seizures (HCC)     2016 last seizure    Stroke

## 2024-03-09 NOTE — PROGRESS NOTES
[facility]                                                                                          Hospitalist Progress Note  Sushma Madala, MD  Office Phone: [phone]        Date of Service:  3/9/2024  NAME:  Abby Quinn  :  1945  MRN:  [id number]       Admission Summary:   Abby Quinn is a 78 y.o. female with a pmhx CAD, PAD, COPD, nicotine dependence, GERD, HtN, depression, past seizures, past stroke, past DVT, and polyneuropathy who presented with left leg pain,and is being admitted for left groin wound, and ischemic limb.     She was hospitalized at [facility] from -3/2. She underwnet RLE angiogram with angioplasty of distal popliteal and PT arteries, left common femoral, and superficial femoral artery endarterectomy, left CEA/EIA stenting, LLE angiogram with left iliofemoral thrombectomy, and left fem pop bypass.  Her hospitalization was complicated by hypotension, and anemia requiring pressors, and hemoihorax requiring chest tube and intubation, and decreased EF requiring life vest.  After being managed in the ICU, and getting extubated, off pressors, and chest tube removed, she signed out of the hospital AMA.     She presents today with b/l LE pain,  In the ED, she was hypotensive to 86/57 with improvement to 110/55.  Other VSS.  CTA showed severe stenosis of distal left femoral artery and left popliteal artery.       Interval history / Subjective:   Patient is seen and examined at bedside this AM. Still has right leg pain. Will wait for vascular surgery eval  Discussed with nursing      Assessment & Plan:     # LLE ischemia   # Severe PAD  -s/p recent RLE angiogram with angioplasty of distal popliteal and PT arteries, left common femoral, and superficial femoral artery endarterectomy, left CEA/EIA stenting, LLE angiogram with left iliofemoral thrombectomy, and left fem pop bypass.  -CTA showed severe stenosis of distal left femoral artery and

## 2024-03-09 NOTE — ED NOTES
TRANSFER - OUT REPORT:    Verbal report given to Nieves on Abby Quinn  being transferred to [address] [place] [address] for routine progression of patient care       Report consisted of patient's Situation, Background, Assessment and   Recommendations(SBAR).     Information from the following report(s) Nurse Handoff Report, ED Encounter Summary, ED SBAR, Intake/Output, MAR, and Recent Results was reviewed with the receiving nurse.    [place] Fall Assessment:    Presents to emergency department  because of falls (Syncope, seizure, or loss of consciousness): No  Age > 70: Yes  Altered Mental Status, Intoxication with alcohol or substance confusion (Disorientation, impaired judgment, poor safety awaremess, or inability to follow instructions): No  Impaired Mobility: Ambulates or transfers with assistive devices or assistance; Unable to ambulate or transer.: Yes  Nursing Judgement: Yes          Lines:   Peripheral IV 03/08/24 Proximal;Right Forearm (Active)   Site Assessment Clean, dry & intact 03/08/24 5103        Opportunity for questions and clarification was provided.      Patient transported with:  Tech

## 2024-03-10 LAB
BACTERIA SPEC CULT: NORMAL
BACTERIA SPEC CULT: NORMAL
EKG ATRIAL RATE: 90 BPM
EKG DIAGNOSIS: NORMAL
EKG P AXIS: 74 DEGREES
EKG P-R INTERVAL: 200 MS
EKG Q-T INTERVAL: 442 MS
EKG QRS DURATION: 138 MS
EKG QTC CALCULATION (BAZETT): 540 MS
EKG R AXIS: 270 DEGREES
EKG T AXIS: 89 DEGREES
EKG VENTRICULAR RATE: 90 BPM
SERVICE CMNT-IMP: NORMAL
SERVICE CMNT-IMP: NORMAL

## 2024-03-13 LAB
BACTERIA SPEC CULT: NORMAL
BACTERIA SPEC CULT: NORMAL
SERVICE CMNT-IMP: NORMAL
SERVICE CMNT-IMP: NORMAL

## 2024-06-09 ENCOUNTER — HOSPITAL ENCOUNTER (EMERGENCY)
Facility: HOSPITAL | Age: 79
Discharge: HOME | End: 2024-06-09
Payer: MEDICARE

## 2024-06-09 ENCOUNTER — APPOINTMENT (OUTPATIENT)
Facility: HOSPITAL | Age: 79
End: 2024-06-09
Payer: MEDICARE

## 2024-06-09 VITALS
BODY MASS INDEX: 14.33 KG/M2 | HEIGHT: 60 IN | DIASTOLIC BLOOD PRESSURE: 66 MMHG | SYSTOLIC BLOOD PRESSURE: 118 MMHG | OXYGEN SATURATION: 100 % | WEIGHT: 73 LBS | HEART RATE: 88 BPM | RESPIRATION RATE: 18 BRPM | TEMPERATURE: 98.2 F

## 2024-06-09 DIAGNOSIS — T87.89 PAIN OF AMPUTATION STUMP OF LEFT LOWER EXTREMITY (HCC): Primary | ICD-10-CM

## 2024-06-09 DIAGNOSIS — M79.605 PAIN OF AMPUTATION STUMP OF LEFT LOWER EXTREMITY (HCC): Primary | ICD-10-CM

## 2024-06-09 PROCEDURE — 6370000000 HC RX 637 (ALT 250 FOR IP): Performed by: PHYSICIAN ASSISTANT

## 2024-06-09 PROCEDURE — 99283 EMERGENCY DEPT VISIT LOW MDM: CPT

## 2024-06-09 PROCEDURE — 73630 X-RAY EXAM OF FOOT: CPT

## 2024-06-09 RX ORDER — OXYCODONE HYDROCHLORIDE 5 MG/1
5 TABLET ORAL EVERY 6 HOURS PRN
Qty: 12 TABLET | Refills: 0 | Status: SHIPPED | OUTPATIENT
Start: 2024-06-09 | End: 2024-06-12

## 2024-06-09 RX ORDER — OXYCODONE HYDROCHLORIDE 5 MG/1
5 TABLET ORAL
Status: COMPLETED | OUTPATIENT
Start: 2024-06-09 | End: 2024-06-09

## 2024-06-09 RX ADMIN — OXYCODONE HYDROCHLORIDE 5 MG: 5 TABLET ORAL at 17:30

## 2024-06-09 ASSESSMENT — PAIN SCALES - GENERAL
PAINLEVEL_OUTOF10: 10
PAINLEVEL_OUTOF10: 10

## 2024-06-09 ASSESSMENT — PAIN DESCRIPTION - PAIN TYPE: TYPE: ACUTE PAIN

## 2024-06-09 ASSESSMENT — PAIN DESCRIPTION - ORIENTATION
ORIENTATION: LEFT
ORIENTATION: LEFT

## 2024-06-09 ASSESSMENT — PAIN DESCRIPTION - LOCATION
LOCATION: FOOT
LOCATION: FOOT

## 2024-06-09 ASSESSMENT — PAIN - FUNCTIONAL ASSESSMENT: PAIN_FUNCTIONAL_ASSESSMENT: 0-10

## 2024-06-09 ASSESSMENT — PAIN DESCRIPTION - DESCRIPTORS
DESCRIPTORS: TENDER;THROBBING
DESCRIPTORS: SHARP

## 2024-06-09 NOTE — DISCHARGE INSTRUCTIONS
side effects or interactions with other medications, please call your primary care physician or contact the ER to speak with the charge nurse.     Please make an appointment with your family doctor or the physician you were referred to for follow-up of this visit as instructed on your discharge paperwork. Return to the ER if you are unable to be seen or if you are unable to be seen in a timely manner.    Should you experience abdominal pain lasting greater than 6 hours, chest pain, difficulty breathing, fever/chills, numbness/tingling, skin changes or other symptoms that concern you, return to the ED sooner. If you feel worse over the next 24 hours, please return to the ED. We are available 24 hours a day. Thank you for trusting us with your care!

## 2024-06-09 NOTE — ED NOTES
Discharge instructions were given to the patient by DOMINIC Mendenhall.     The patient left the Emergency Department alert and oriented and in no acute distress with 1 prescriptions. The patient was encouraged to call or return to the ED for worsening issues or problems and was encouraged to schedule a follow up appointment for continuing care.     Ambulation assessment completed before discharge.  Pt left Emergency Department at expected ambulatory status with no ortho devices  Ortho device education: none    The patient verbalized understanding of discharge instructions and prescriptions, all questions were answered. The patient has no further concerns at this time.

## 2024-06-09 NOTE — ED NOTES
Pt presents ambulatory to ED complaining of left foot pain from previous amputation. Pt is alert and oriented x 4, RR even and unlabored, skin is warm and dry. Assesment completed and pt updated on plan of care.       Emergency Department Nursing Plan of Care       The Nursing Plan of Care is developed from the Nursing assessment and Emergency Department Attending provider initial evaluation.  The plan of care may be reviewed in the “ED Provider note”.    The Plan of Care was developed with the following considerations:   Patient / Family readiness to learn indicated by:verbalized understanding  Persons(s) to be included in education: patient and family  Barriers to Learning/Limitations:None    Signed     Za Thorpe RN    6/9/2024   4:52 PM

## 2024-06-09 NOTE — ED PROVIDER NOTES
[facility] EMERGENCY DEPT  EMERGENCY DEPARTMENT ENCOUNTER       Pt Name: Abby Quinn  MRN: [id number]  Birthdate 1945  Date of evaluation: 6/9/2024  Provider: FREDDY Hillman   PCP: Mikhail Heath III, MD  Note Started: 4:45 PM EDT 6/9/24     CHIEF COMPLAINT       Chief Complaint   Patient presents with    Post-op Problem     Per pt reports left foot pain after receiving toe amputation x 5/13/24, denies receiving follow up care after surgery.         HISTORY OF PRESENT ILLNESS: 1 or more elements      History From: Patient and patient's goddaughter  HPI Limitations: None     Abby Quinn is a 78 y.o. female who presents ambulatory with her goddaughter complaining of pain in the left foot.  Patient did have amputation of the toes of the left foot due to her peripheral vascular disease.  When asked the patient and her goddaughter when and where the surgery occurred and who performed the surgery, neither are able to tell me.     Nursing Notes were all reviewed and agreed with or any disagreements were addressed in the HPI.     REVIEW OF SYSTEMS      Review of Systems   Musculoskeletal:         Left foot pain        Positives and Pertinent negatives as per HPI.    PAST HISTORY     Past Medical History:  Past Medical History:   Diagnosis Date    Arthritis     Arthritis     Asthma     Chronic obstructive pulmonary disease (HCC)     Depression     GERD (gastroesophageal reflux disease)     Heart murmur     benign    Hypertension     not on medication    Ill-defined condition     chronic opiod use    Ill-defined condition     polyneuropathy    Ill-defined condition     pancreatitis    Ill-defined condition     cerebral atherosclerosis    Lung disease     Seizures (HCC)     2016 last seizure    Stroke (HCC)     tia    Thromboembolus (HCC)     right leg       Past Surgical History:  Past Surgical History:   Procedure Laterality Date    APPENDECTOMY  1972    BREAST SURGERY      left breast lumpectomy    BYPASS GRAFT

## (undated) DEVICE — TOTAL TRAY, DB, 100% SILI FOLEY, 16FR 10: Brand: MEDLINE

## (undated) DEVICE — PACEMAKER PACK: Brand: MEDLINE INDUSTRIES, INC.

## (undated) DEVICE — TRAY,IRRIGATION,PISTON SYRINGE,60ML,STRL: Brand: MEDLINE

## (undated) DEVICE — SUTURE VCRL SZ 2-0 L36IN ABSRB UD L40MM CT 1/2 CIR J957H

## (undated) DEVICE — Device

## (undated) DEVICE — VASCULAR-RICHMOND-LF: Brand: MEDLINE INDUSTRIES, INC.

## (undated) DEVICE — DEVON™ KNEE AND BODY STRAP 60" X 3" (1.5 M X 7.6 CM): Brand: DEVON

## (undated) DEVICE — STERILE POLYISOPRENE POWDER-FREE SURGICAL GLOVES: Brand: PROTEXIS

## (undated) DEVICE — ASTOUND STANDARD SURGICAL GOWN, XL: Brand: CONVERTORS

## (undated) DEVICE — SUTURE NONABSORBABLE MONOFILAMENT 4-0 CV-5 PT-13 24 IN GORTX 5K08B

## (undated) DEVICE — (D)PREP SKN CHLRAPRP APPL 26ML -- CONVERT TO ITEM 371833

## (undated) DEVICE — TRAY CATH 16F DRN BG LTX -- CONVERT TO ITEM 363158

## (undated) DEVICE — KIT ACCS INTRO 4FR L10CM NDL 21GA L7CM GWIRE L40CM

## (undated) DEVICE — INFECTION CONTROL KIT SYS

## (undated) DEVICE — DRAPE,REIN 53X77,STERILE: Brand: MEDLINE

## (undated) DEVICE — SUTURE VCRL SZ 3-0 L27IN ABSRB UD L24MM PS-1 3/8 CIR PRIM J936H

## (undated) DEVICE — (D)STRIP SKN CLSR 0.5X4IN WHT --

## (undated) DEVICE — SUT PROL 6-0 24IN BV1 DA BLU --

## (undated) DEVICE — SUT SLK 0 30IN SH BLK --

## (undated) DEVICE — SET BLD COLL 21GA TB L12IN NDL L0.75IN WNG GRN SIMP EZ TO

## (undated) DEVICE — SUTURE VCRL 2-0 XLH 27IN ABSRB BRAID VLT J581G

## (undated) DEVICE — Z DISCONTINUED USE 2425483 (LOW STOCK PER MEDLINE) TAPE UMB L18IN DIA1/8IN WHT COT NONABSORBABLE W/O NDL FOR

## (undated) DEVICE — HANDLE LT SNAP ON ULT DURABLE LENS FOR TRUMPF ALC DISPOSABLE

## (undated) DEVICE — SUTURE PROL SZ 5-0 L24IN NONABSORBABLE BLU RB-2 L13IN 1/2 8554H

## (undated) DEVICE — REM POLYHESIVE ADULT PATIENT RETURN ELECTRODE: Brand: VALLEYLAB

## (undated) DEVICE — LIMB HOLDER, WRIST/ANKLE: Brand: DEROYAL

## (undated) DEVICE — MEDI-TRACE CADENCE ADULT, DEFIBRILLATION ELECTRODE -RTS  (10 PR/PK) - PHILIPS: Brand: MEDI-TRACE CADENCE

## (undated) DEVICE — 3M™ IOBAN™ 2 ANTIMICROBIAL INCISE DRAPE 6650EZ: Brand: IOBAN™ 2

## (undated) DEVICE — ZINACTIVE USE 2641837 CLIP LIG M BLU TI HRT SHP WIRE HORZ 600 PER BX

## (undated) DEVICE — NEEDLE BLD COLL 22GA L1.25IN BLK W/OUT HNG PREATTACH HLDR

## (undated) DEVICE — PENCIL SMK EVAC 10 FT BLADE ELECTRD ROCKER FOR TELSCP

## (undated) DEVICE — PREP CHLORAPREP 10.5 ML ORG --

## (undated) DEVICE — INTRO SHTH 9FR 13X20CM -- USE ITEM# 341577

## (undated) DEVICE — STRAP,POSITIONING,KNEE/BODY,FOAM,4X60": Brand: MEDLINE

## (undated) DEVICE — BAG ISOL TRNSPRT 18X18.5IN LF --

## (undated) DEVICE — PREP SKN CHLRAPRP APL 26ML STR --

## (undated) DEVICE — SYR 3ML LL TIP 1/10ML GRAD --

## (undated) DEVICE — SOLUTION IV 1000ML 0.9% SOD CHL

## (undated) DEVICE — ADULT SPO2 SENSOR: Brand: NELLCOR

## (undated) DEVICE — SUTURE COAT VCRL SZ 4-0 L18IN ABSRB UD L19MM PS-2 1/2 CIR J496G

## (undated) DEVICE — SOLUTION IRRIG 1000ML H2O STRL BLT

## (undated) DEVICE — SOL INJ SOD CL 0.9% 500ML BG --

## (undated) DEVICE — AIRLIFE™ ADULT CUSHION NASAL CANNULA 14 FOOT (4.3) CRUSH-RESISTANT OXYGEN TUBING, AND U/CONNECT-IT ADAPTER: Brand: AIRLIFE™

## (undated) DEVICE — SOLUTION IV 500ML 0.9% SOD CHL FLX CONT

## (undated) DEVICE — SUTURE COAT VCRL PC 5 PRECIS COSM CONVENTIONAL CUT PRIM 3 8 J823H

## (undated) DEVICE — DRESSING FOAM W10XL15CM VISCOSE POLY SAFETAC NONWOVEN PERF

## (undated) DEVICE — APPLICATOR FBR TIP L6IN COT TIP WOOD SHFT SWAB 2000 PER CA

## (undated) DEVICE — SUTURE PROL SZ 5-0 L30IN NONABSORBABLE BLU L13MM RB-2 1/2 8710H

## (undated) DEVICE — STERILE POLYISOPRENE POWDER-FREE SURGICAL GLOVES WITH EMOLLIENT COATING: Brand: PROTEXIS

## (undated) DEVICE — INTRO SHTH 7FR 13X20CM -- TEARAWAY

## (undated) DEVICE — CATH URETH FOL 2W SH 12FRX5ML -- CONVERT TO ITEM 363070